# Patient Record
Sex: MALE | Race: WHITE | Employment: OTHER | URBAN - METROPOLITAN AREA
[De-identification: names, ages, dates, MRNs, and addresses within clinical notes are randomized per-mention and may not be internally consistent; named-entity substitution may affect disease eponyms.]

---

## 2017-12-26 PROBLEM — K40.90 UNILATERAL INGUINAL HERNIA: Status: ACTIVE | Noted: 2017-12-26

## 2018-04-03 DIAGNOSIS — I10 ESSENTIAL HYPERTENSION: ICD-10-CM

## 2018-04-03 RX ORDER — LISINOPRIL 20 MG/1
TABLET ORAL
Qty: 30 TABLET | Refills: 1 | Status: SHIPPED | OUTPATIENT
Start: 2018-04-03 | End: 2018-06-04 | Stop reason: SDUPTHER

## 2018-06-04 DIAGNOSIS — I10 ESSENTIAL HYPERTENSION: ICD-10-CM

## 2018-06-04 RX ORDER — LISINOPRIL 20 MG/1
TABLET ORAL
Qty: 30 TABLET | Refills: 2 | Status: SHIPPED | OUTPATIENT
Start: 2018-06-04 | End: 2018-08-28 | Stop reason: SDUPTHER

## 2018-06-18 DIAGNOSIS — M10.9 GOUT SYNOVITIS: ICD-10-CM

## 2018-06-19 ENCOUNTER — HOSPITAL ENCOUNTER (OUTPATIENT)
Age: 68
Discharge: HOME | End: 2018-06-19
Payer: MEDICARE

## 2018-06-19 LAB
ALBUMIN SERPL-MCNC: 4.4 G/DL (ref 3.5–5.2)
ALP BLD-CCNC: 92 U/L (ref 40–129)
ALT SERPL-CCNC: 13 U/L (ref 0–40)
ANION GAP SERPL CALCULATED.3IONS-SCNC: 10 MMOL/L (ref 7–16)
AST SERPL-CCNC: 17 U/L (ref 0–39)
BASOPHILS ABSOLUTE: 0.03 E9/L (ref 0–0.2)
BASOPHILS RELATIVE PERCENT: 0.5 % (ref 0–2)
BILIRUB SERPL-MCNC: 0.4 MG/DL (ref 0–1.2)
BUN BLDV-MCNC: 17 MG/DL (ref 8–23)
CALCIUM SERPL-MCNC: 9.4 MG/DL (ref 8.6–10.2)
CHLORIDE BLD-SCNC: 106 MMOL/L (ref 98–107)
CHOLESTEROL, TOTAL: 226 MG/DL (ref 0–199)
CO2: 26 MMOL/L (ref 22–29)
CREAT SERPL-MCNC: 0.9 MG/DL (ref 0.7–1.2)
EOSINOPHILS ABSOLUTE: 0.17 E9/L (ref 0.05–0.5)
EOSINOPHILS RELATIVE PERCENT: 3 % (ref 0–6)
GFR AFRICAN AMERICAN: >60
GFR NON-AFRICAN AMERICAN: >60 ML/MIN/1.73
GLUCOSE BLD-MCNC: 105 MG/DL (ref 74–109)
HCT VFR BLD CALC: 42.1 % (ref 37–54)
HDLC SERPL-MCNC: 79 MG/DL
HEMOGLOBIN: 14.2 G/DL (ref 12.5–16.5)
IMMATURE GRANULOCYTES #: 0.02 E9/L
IMMATURE GRANULOCYTES %: 0.4 % (ref 0–5)
LDL CHOLESTEROL CALCULATED: 140 MG/DL (ref 0–99)
LYMPHOCYTES ABSOLUTE: 1.39 E9/L (ref 1.5–4)
LYMPHOCYTES RELATIVE PERCENT: 24.9 % (ref 20–42)
MCH RBC QN AUTO: 31.6 PG (ref 26–35)
MCHC RBC AUTO-ENTMCNC: 33.7 % (ref 32–34.5)
MCV RBC AUTO: 93.6 FL (ref 80–99.9)
MONOCYTES ABSOLUTE: 0.38 E9/L (ref 0.1–0.95)
MONOCYTES RELATIVE PERCENT: 6.8 % (ref 2–12)
NEUTROPHILS ABSOLUTE: 3.59 E9/L (ref 1.8–7.3)
NEUTROPHILS RELATIVE PERCENT: 64.4 % (ref 43–80)
PDW BLD-RTO: 14.1 FL (ref 11.5–15)
PLATELET # BLD: 201 E9/L (ref 130–450)
PMV BLD AUTO: 9.2 FL (ref 7–12)
POTASSIUM SERPL-SCNC: 4.7 MMOL/L (ref 3.5–5)
PROSTATE SPECIFIC ANTIGEN: 0.69 NG/ML (ref 0–4)
RBC # BLD: 4.5 E12/L (ref 3.8–5.8)
SODIUM BLD-SCNC: 142 MMOL/L (ref 132–146)
TOTAL PROTEIN: 7.8 G/DL (ref 6.4–8.3)
TRIGL SERPL-MCNC: 34 MG/DL (ref 0–149)
VLDLC SERPL CALC-MCNC: 7 MG/DL
WBC # BLD: 5.6 E9/L (ref 4.5–11.5)

## 2018-06-19 PROCEDURE — 85025 COMPLETE CBC W/AUTO DIFF WBC: CPT

## 2018-06-19 PROCEDURE — 80061 LIPID PANEL: CPT

## 2018-06-19 PROCEDURE — G0103 PSA SCREENING: HCPCS

## 2018-06-19 PROCEDURE — 80053 COMPREHEN METABOLIC PANEL: CPT

## 2018-06-19 PROCEDURE — 36415 COLL VENOUS BLD VENIPUNCTURE: CPT

## 2018-06-20 RX ORDER — ALLOPURINOL 300 MG/1
300 TABLET ORAL DAILY
Qty: 30 TABLET | Refills: 5 | Status: SHIPPED | OUTPATIENT
Start: 2018-06-20 | End: 2018-12-17 | Stop reason: SDUPTHER

## 2018-07-03 ENCOUNTER — OFFICE VISIT (OUTPATIENT)
Dept: INTERNAL MEDICINE CLINIC | Age: 68
End: 2018-07-03
Payer: MEDICARE

## 2018-07-03 VITALS
OXYGEN SATURATION: 97 % | TEMPERATURE: 98.4 F | RESPIRATION RATE: 16 BRPM | BODY MASS INDEX: 24.14 KG/M2 | DIASTOLIC BLOOD PRESSURE: 90 MMHG | HEART RATE: 71 BPM | WEIGHT: 163 LBS | SYSTOLIC BLOOD PRESSURE: 155 MMHG | HEIGHT: 69 IN

## 2018-07-03 DIAGNOSIS — M79.18 BUTTOCK PAIN: ICD-10-CM

## 2018-07-03 DIAGNOSIS — F41.9 ANXIETY: ICD-10-CM

## 2018-07-03 DIAGNOSIS — I15.9 SECONDARY HYPERTENSION: ICD-10-CM

## 2018-07-03 DIAGNOSIS — E78.2 MIXED HYPERLIPIDEMIA: Primary | ICD-10-CM

## 2018-07-03 DIAGNOSIS — Z13.6 SCREENING FOR ABDOMINAL AORTIC ANEURYSM: ICD-10-CM

## 2018-07-03 DIAGNOSIS — Z00.00 HEALTHCARE MAINTENANCE: ICD-10-CM

## 2018-07-03 DIAGNOSIS — M1A.0210 IDIOPATHIC CHRONIC GOUT OF RIGHT ELBOW WITHOUT TOPHUS: ICD-10-CM

## 2018-07-03 PROCEDURE — 99213 OFFICE O/P EST LOW 20 MIN: CPT | Performed by: INTERNAL MEDICINE

## 2018-07-03 PROCEDURE — G8427 DOCREV CUR MEDS BY ELIG CLIN: HCPCS | Performed by: INTERNAL MEDICINE

## 2018-07-03 PROCEDURE — 4040F PNEUMOC VAC/ADMIN/RCVD: CPT | Performed by: INTERNAL MEDICINE

## 2018-07-03 PROCEDURE — 1036F TOBACCO NON-USER: CPT | Performed by: INTERNAL MEDICINE

## 2018-07-03 PROCEDURE — 1123F ACP DISCUSS/DSCN MKR DOCD: CPT | Performed by: INTERNAL MEDICINE

## 2018-07-03 PROCEDURE — G8598 ASA/ANTIPLAT THER USED: HCPCS | Performed by: INTERNAL MEDICINE

## 2018-07-03 PROCEDURE — 90732 PPSV23 VACC 2 YRS+ SUBQ/IM: CPT | Performed by: INTERNAL MEDICINE

## 2018-07-03 PROCEDURE — 3017F COLORECTAL CA SCREEN DOC REV: CPT | Performed by: INTERNAL MEDICINE

## 2018-07-03 PROCEDURE — G8420 CALC BMI NORM PARAMETERS: HCPCS | Performed by: INTERNAL MEDICINE

## 2018-07-03 RX ORDER — LORAZEPAM 0.5 MG/1
0.5 TABLET ORAL EVERY 8 HOURS PRN
Qty: 90 TABLET | Refills: 0 | Status: SHIPPED | OUTPATIENT
Start: 2018-07-03 | End: 2019-01-15 | Stop reason: SDUPTHER

## 2018-07-03 RX ORDER — HYDROCODONE BITARTRATE AND ACETAMINOPHEN 7.5; 325 MG/1; MG/1
1 TABLET ORAL EVERY 12 HOURS PRN
Qty: 30 TABLET | Refills: 0 | Status: SHIPPED | OUTPATIENT
Start: 2018-07-03 | End: 2018-12-03

## 2018-07-03 NOTE — PROGRESS NOTES
I was present when the patient was in the clinic. I have discussed the case, including pertinent history and exam findings with the resident. I agree with the assessment, plan and orders as documented by the resident.     [address]  7/3/2018

## 2018-07-03 NOTE — PROGRESS NOTES
Internal Medicine Clinic History & Physical     NAME: Marielos Castano        :  1950        MRN:  [id number]    No chief complaint on file. History of Present Illness   7/3/2018  Patient presents for routine follow up. No acute complaints currently. Review of Systems  Please see HPI above. All bolded are positive.   Gen: fever, chills, fatigue, weakness, diaphoresis, or unintentional weight change  Head: headache, vision change, hearing loss  Chest: chest pain/heaviness, palpitations  Lungs: shortness of breath, wheezing, coughing, hemoptysis  Abdomen: abdominal pain, nausea, vomiting, diarrhea, constipation, melena, hematochezia, hematemesis, or loss of appetite  Extremities: lower extremity edema, myalgias, arthralgias  Urinary: dysuria, hematuria, weak flow, or increase in frequency  Neurologic: lightheadedness, dizziness, confusion, syncope  Psychiatric: depression, suicidal ideation, or anxiety      Past Medical History:   Diagnosis Date    Blurred vision, right eye     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,     Gout     History of cardiovascular stress test 2015    Lexiscan stress test    Hypertension     Kidney stone     stent/    Osteoarthritis        Past Surgical History:   Procedure Laterality Date    LITHOTRIPSY      MANDIBLE SURGERY      OTHER SURGICAL HISTORY Left 2014    EXCISION OF OLECRANON BURSA    OTHER SURGICAL HISTORY  2018    Laparoscopic transabdominal right inguinal hernia repair with mesk and Excision of Lipoma of the Cord    TONSILLECTOMY         Family History  Family History   Problem Relation Age of Onset    Heart Disease Mother     Stroke Mother     Coronary Art Dis Father        Social History      Social History   Substance Use Topics    Smoking status: Former Smoker     Packs/day: 0.25     Years: 50.00     Types: Cigarettes     Quit date: 2015    Smokeless tobacco: Never Used    Alcohol use 0.6 oz/week     1 Cans of beer per week Comment: social     Illicit drug use-     Home Medications  Current Outpatient Prescriptions on File Prior to Visit   Medication Sig Dispense Refill    allopurinol (ZYLOPRIM) 300 MG tablet Take 1 tablet by mouth daily 30 tablet 5    lisinopril (PRINIVIL;ZESTRIL) 20 MG tablet TAKE ONE TABLET BY MOUTH EVERY DAY 30 tablet 2    ibuprofen (ADVIL;MOTRIN) 800 MG tablet Take 1 tablet by mouth every 6 hours as needed for Pain 20 tablet 0    LORazepam (ATIVAN) 0.5 MG tablet Take 1 tablet by mouth every 8 hours as needed for Anxiety . 90 tablet 0    HYDROcodone-acetaminophen (NORCO) 7.5-325 MG per tablet Take 1 tablet by mouth every 8 hours as needed for Pain . Earliest Fill Date: 12/26/17 60 tablet 0    clopidogrel (PLAVIX) 75 MG tablet Take 75 mg by mouth every other day        No current facility-administered medications on file prior to visit. Allergies  No Known Allergies    Objective  There were no vitals filed for this visit. Physical Exam:  General: Awake, alert, and oriented to person, place, time, and purpose, appears stated age and cooperative, No acute distress  Head: Normocephalic, atraumatic  Eyes: conjunctivae/corneas clear. EOM's intact. Mouth: Mucous membranes moist with no pharyngeal exudate or erythema  Neck: no JVD, no adenopathy, symmetrical, trachea midline  Back: symmetric, ROM normal  Lungs: clear to auscultation bilaterally without wheezes, rales, or rhonchi  Heart: regular rate and rhythm, S1, S2 normal, no murmur, click, rub or gallop  Abdomen: soft, non-tender; bowel sounds normal; no masses,  no organomegaly  Extremities:atraumatic, no cyanosis, no edema  Pulses: 2+ pedal pulses palpated  Skin: color, texture, turgor within normal limits. No rashes or lesions or normal  Neurologic:speech appropriate, moves all 4 extremities, normal muscle strength and tone, CN 2-12 grossly intact  Osteopathic/Structural Exam: Examined in seated and supine position.  Normal thoracic kyphosis, normal

## 2018-08-28 DIAGNOSIS — I10 ESSENTIAL HYPERTENSION: ICD-10-CM

## 2018-08-28 RX ORDER — LISINOPRIL 20 MG/1
TABLET ORAL
Qty: 30 TABLET | Refills: 1 | Status: SHIPPED | OUTPATIENT
Start: 2018-08-28 | End: 2018-10-30 | Stop reason: SDUPTHER

## 2018-08-30 DIAGNOSIS — I10 ESSENTIAL HYPERTENSION: ICD-10-CM

## 2018-08-30 RX ORDER — LISINOPRIL 20 MG/1
TABLET ORAL
Qty: 30 TABLET | Refills: 1 | Status: CANCELLED | OUTPATIENT
Start: 2018-08-30

## 2018-10-30 DIAGNOSIS — I10 ESSENTIAL HYPERTENSION: ICD-10-CM

## 2018-10-31 RX ORDER — LISINOPRIL 20 MG/1
TABLET ORAL
Qty: 30 TABLET | Refills: 5 | Status: SHIPPED | OUTPATIENT
Start: 2018-10-31 | End: 2019-04-27 | Stop reason: SDUPTHER

## 2018-12-10 ENCOUNTER — HOSPITAL ENCOUNTER (OUTPATIENT)
Age: 68
Discharge: HOME | End: 2018-12-10
Payer: MEDICARE

## 2018-12-10 DIAGNOSIS — Z00.00 HEALTHCARE MAINTENANCE: ICD-10-CM

## 2018-12-10 LAB
CREATININE URINE: 93 MG/DL (ref 40–278)
HBA1C MFR BLD: 5.5 % (ref 4–5.6)
MICROALBUMIN UR-MCNC: <12 MG/L
MICROALBUMIN/CREAT UR-RTO: ABNORMAL (ref 0–30)

## 2018-12-10 PROCEDURE — 83036 HEMOGLOBIN GLYCOSYLATED A1C: CPT

## 2018-12-10 PROCEDURE — 82044 UR ALBUMIN SEMIQUANTITATIVE: CPT

## 2018-12-10 PROCEDURE — 36415 COLL VENOUS BLD VENIPUNCTURE: CPT

## 2018-12-10 PROCEDURE — 82570 ASSAY OF URINE CREATININE: CPT

## 2018-12-17 DIAGNOSIS — M10.9 GOUT SYNOVITIS: ICD-10-CM

## 2018-12-18 RX ORDER — ALLOPURINOL 300 MG/1
300 TABLET ORAL DAILY
Qty: 30 TABLET | Refills: 5 | Status: SHIPPED | OUTPATIENT
Start: 2018-12-18 | End: 2019-06-10 | Stop reason: SDUPTHER

## 2019-01-15 ENCOUNTER — OFFICE VISIT (OUTPATIENT)
Dept: INTERNAL MEDICINE CLINIC | Age: 69
End: 2019-01-15
Payer: MEDICARE

## 2019-01-15 VITALS
HEART RATE: 86 BPM | WEIGHT: 164.2 LBS | BODY MASS INDEX: 24.32 KG/M2 | HEIGHT: 69 IN | DIASTOLIC BLOOD PRESSURE: 100 MMHG | OXYGEN SATURATION: 97 % | SYSTOLIC BLOOD PRESSURE: 138 MMHG | TEMPERATURE: 98.7 F

## 2019-01-15 DIAGNOSIS — G89.29 CHRONIC BACK PAIN, UNSPECIFIED BACK LOCATION, UNSPECIFIED BACK PAIN LATERALITY: ICD-10-CM

## 2019-01-15 DIAGNOSIS — I65.22 STENOSIS OF LEFT CAROTID ARTERY: ICD-10-CM

## 2019-01-15 DIAGNOSIS — I15.9 SECONDARY HYPERTENSION: ICD-10-CM

## 2019-01-15 DIAGNOSIS — E78.2 MIXED HYPERLIPIDEMIA: Primary | ICD-10-CM

## 2019-01-15 DIAGNOSIS — M54.9 CHRONIC BACK PAIN, UNSPECIFIED BACK LOCATION, UNSPECIFIED BACK PAIN LATERALITY: ICD-10-CM

## 2019-01-15 DIAGNOSIS — F41.9 ANXIETY: ICD-10-CM

## 2019-01-15 DIAGNOSIS — Z23 INFLUENZA VACCINE NEEDED: ICD-10-CM

## 2019-01-15 PROCEDURE — 4040F PNEUMOC VAC/ADMIN/RCVD: CPT | Performed by: INTERNAL MEDICINE

## 2019-01-15 PROCEDURE — G8598 ASA/ANTIPLAT THER USED: HCPCS | Performed by: INTERNAL MEDICINE

## 2019-01-15 PROCEDURE — 99213 OFFICE O/P EST LOW 20 MIN: CPT | Performed by: INTERNAL MEDICINE

## 2019-01-15 PROCEDURE — 1101F PT FALLS ASSESS-DOCD LE1/YR: CPT | Performed by: INTERNAL MEDICINE

## 2019-01-15 PROCEDURE — 1123F ACP DISCUSS/DSCN MKR DOCD: CPT | Performed by: INTERNAL MEDICINE

## 2019-01-15 PROCEDURE — G8427 DOCREV CUR MEDS BY ELIG CLIN: HCPCS | Performed by: INTERNAL MEDICINE

## 2019-01-15 PROCEDURE — 3017F COLORECTAL CA SCREEN DOC REV: CPT | Performed by: INTERNAL MEDICINE

## 2019-01-15 PROCEDURE — G8420 CALC BMI NORM PARAMETERS: HCPCS | Performed by: INTERNAL MEDICINE

## 2019-01-15 PROCEDURE — G8482 FLU IMMUNIZE ORDER/ADMIN: HCPCS | Performed by: INTERNAL MEDICINE

## 2019-01-15 PROCEDURE — 1036F TOBACCO NON-USER: CPT | Performed by: INTERNAL MEDICINE

## 2019-01-15 RX ORDER — LORAZEPAM 0.5 MG/1
0.5 TABLET ORAL DAILY PRN
Qty: 90 TABLET | Refills: 0 | Status: SHIPPED | OUTPATIENT
Start: 2019-01-15 | End: 2019-07-09 | Stop reason: SDUPTHER

## 2019-01-15 RX ORDER — HYDROCODONE BITARTRATE AND ACETAMINOPHEN 7.5; 325 MG/1; MG/1
1 TABLET ORAL DAILY PRN
Qty: 60 TABLET | Refills: 0 | Status: SHIPPED | OUTPATIENT
Start: 2019-01-15 | End: 2019-07-09 | Stop reason: ALTCHOICE

## 2019-01-15 RX ORDER — ATORVASTATIN CALCIUM 40 MG/1
40 TABLET, FILM COATED ORAL DAILY
Qty: 30 TABLET | Refills: 3 | Status: SHIPPED | OUTPATIENT
Start: 2019-01-15 | End: 2019-05-21 | Stop reason: SDUPTHER

## 2019-01-15 ASSESSMENT — PATIENT HEALTH QUESTIONNAIRE - PHQ9
SUM OF ALL RESPONSES TO PHQ QUESTIONS 1-9: 0
1. LITTLE INTEREST OR PLEASURE IN DOING THINGS: 0
SUM OF ALL RESPONSES TO PHQ QUESTIONS 1-9: 0
SUM OF ALL RESPONSES TO PHQ9 QUESTIONS 1 & 2: 0
2. FEELING DOWN, DEPRESSED OR HOPELESS: 0

## 2019-01-16 ENCOUNTER — TELEPHONE (OUTPATIENT)
Dept: INTERNAL MEDICINE CLINIC | Age: 69
End: 2019-01-16

## 2019-01-16 PROCEDURE — 90688 IIV4 VACCINE SPLT 0.5 ML IM: CPT | Performed by: INTERNAL MEDICINE

## 2019-01-23 ENCOUNTER — TELEPHONE (OUTPATIENT)
Dept: INTERNAL MEDICINE CLINIC | Age: 69
End: 2019-01-23

## 2019-04-27 DIAGNOSIS — I10 ESSENTIAL HYPERTENSION: ICD-10-CM

## 2019-04-30 RX ORDER — LISINOPRIL 20 MG/1
TABLET ORAL
Qty: 30 TABLET | Refills: 4 | Status: SHIPPED | OUTPATIENT
Start: 2019-04-30 | End: 2019-05-01 | Stop reason: SDUPTHER

## 2019-05-01 DIAGNOSIS — I10 ESSENTIAL HYPERTENSION: ICD-10-CM

## 2019-05-01 RX ORDER — LISINOPRIL 20 MG/1
20 TABLET ORAL DAILY
Qty: 30 TABLET | Refills: 4 | Status: SHIPPED | OUTPATIENT
Start: 2019-05-01 | End: 2020-01-14 | Stop reason: SDUPTHER

## 2019-05-21 DIAGNOSIS — E78.2 MIXED HYPERLIPIDEMIA: ICD-10-CM

## 2019-05-21 RX ORDER — ATORVASTATIN CALCIUM 40 MG/1
40 TABLET, FILM COATED ORAL DAILY
Qty: 30 TABLET | Refills: 5 | Status: SHIPPED | OUTPATIENT
Start: 2019-05-21 | End: 2019-11-25 | Stop reason: SDUPTHER

## 2019-06-10 DIAGNOSIS — M10.9 GOUT SYNOVITIS: ICD-10-CM

## 2019-06-11 RX ORDER — ALLOPURINOL 300 MG/1
TABLET ORAL
Qty: 30 TABLET | Refills: 4 | Status: SHIPPED | OUTPATIENT
Start: 2019-06-11 | End: 2019-11-08 | Stop reason: SDUPTHER

## 2019-06-28 ENCOUNTER — HOSPITAL ENCOUNTER (OUTPATIENT)
Age: 69
Discharge: HOME | End: 2019-06-28
Payer: MEDICARE

## 2019-06-28 DIAGNOSIS — E78.2 MIXED HYPERLIPIDEMIA: ICD-10-CM

## 2019-06-28 DIAGNOSIS — I15.9 SECONDARY HYPERTENSION: ICD-10-CM

## 2019-06-28 LAB
ALBUMIN SERPL-MCNC: 5 G/DL (ref 3.5–5.2)
ALP BLD-CCNC: 112 U/L (ref 40–129)
ALT SERPL-CCNC: 19 U/L (ref 0–40)
ANION GAP SERPL CALCULATED.3IONS-SCNC: 13 MMOL/L (ref 7–16)
AST SERPL-CCNC: 22 U/L (ref 0–39)
BASOPHILS ABSOLUTE: 0.03 E9/L (ref 0–0.2)
BASOPHILS RELATIVE PERCENT: 0.5 % (ref 0–2)
BILIRUB SERPL-MCNC: 0.7 MG/DL (ref 0–1.2)
BUN BLDV-MCNC: 23 MG/DL (ref 8–23)
CALCIUM SERPL-MCNC: 10.5 MG/DL (ref 8.6–10.2)
CHLORIDE BLD-SCNC: 104 MMOL/L (ref 98–107)
CHOLESTEROL, TOTAL: 157 MG/DL (ref 0–199)
CO2: 25 MMOL/L (ref 22–29)
CREAT SERPL-MCNC: 1.1 MG/DL (ref 0.7–1.2)
EOSINOPHILS ABSOLUTE: 0.16 E9/L (ref 0.05–0.5)
EOSINOPHILS RELATIVE PERCENT: 2.7 % (ref 0–6)
GFR AFRICAN AMERICAN: >60
GFR NON-AFRICAN AMERICAN: >60 ML/MIN/1.73
GLUCOSE BLD-MCNC: 100 MG/DL (ref 74–99)
HCT VFR BLD CALC: 43.6 % (ref 37–54)
HDLC SERPL-MCNC: 67 MG/DL
HEMOGLOBIN: 14.2 G/DL (ref 12.5–16.5)
IMMATURE GRANULOCYTES #: 0.01 E9/L
IMMATURE GRANULOCYTES %: 0.2 % (ref 0–5)
LDL CHOLESTEROL CALCULATED: 81 MG/DL (ref 0–99)
LYMPHOCYTES ABSOLUTE: 1.79 E9/L (ref 1.5–4)
LYMPHOCYTES RELATIVE PERCENT: 29.7 % (ref 20–42)
MCH RBC QN AUTO: 31.4 PG (ref 26–35)
MCHC RBC AUTO-ENTMCNC: 32.6 % (ref 32–34.5)
MCV RBC AUTO: 96.5 FL (ref 80–99.9)
MONOCYTES ABSOLUTE: 0.45 E9/L (ref 0.1–0.95)
MONOCYTES RELATIVE PERCENT: 7.5 % (ref 2–12)
NEUTROPHILS ABSOLUTE: 3.59 E9/L (ref 1.8–7.3)
NEUTROPHILS RELATIVE PERCENT: 59.4 % (ref 43–80)
PDW BLD-RTO: 13.5 FL (ref 11.5–15)
PLATELET # BLD: 227 E9/L (ref 130–450)
PMV BLD AUTO: 9.6 FL (ref 7–12)
POTASSIUM SERPL-SCNC: 4.9 MMOL/L (ref 3.5–5)
RBC # BLD: 4.52 E12/L (ref 3.8–5.8)
SODIUM BLD-SCNC: 142 MMOL/L (ref 132–146)
TOTAL PROTEIN: 8.2 G/DL (ref 6.4–8.3)
TRIGL SERPL-MCNC: 46 MG/DL (ref 0–149)
VLDLC SERPL CALC-MCNC: 9 MG/DL
WBC # BLD: 6 E9/L (ref 4.5–11.5)

## 2019-06-28 PROCEDURE — 36415 COLL VENOUS BLD VENIPUNCTURE: CPT

## 2019-06-28 PROCEDURE — 80061 LIPID PANEL: CPT

## 2019-06-28 PROCEDURE — 80053 COMPREHEN METABOLIC PANEL: CPT

## 2019-06-28 PROCEDURE — 85025 COMPLETE CBC W/AUTO DIFF WBC: CPT

## 2019-07-09 ENCOUNTER — OFFICE VISIT (OUTPATIENT)
Dept: INTERNAL MEDICINE CLINIC | Age: 69
End: 2019-07-09
Payer: MEDICARE

## 2019-07-09 VITALS
WEIGHT: 160.6 LBS | TEMPERATURE: 98.3 F | OXYGEN SATURATION: 97 % | HEART RATE: 82 BPM | SYSTOLIC BLOOD PRESSURE: 132 MMHG | DIASTOLIC BLOOD PRESSURE: 80 MMHG | HEIGHT: 69 IN | BODY MASS INDEX: 23.79 KG/M2

## 2019-07-09 DIAGNOSIS — F41.9 ANXIETY: ICD-10-CM

## 2019-07-09 DIAGNOSIS — E78.5 HYPERLIPIDEMIA, UNSPECIFIED HYPERLIPIDEMIA TYPE: ICD-10-CM

## 2019-07-09 DIAGNOSIS — G89.29 CHRONIC LOW BACK PAIN, UNSPECIFIED BACK PAIN LATERALITY, WITH SCIATICA PRESENCE UNSPECIFIED: Primary | ICD-10-CM

## 2019-07-09 DIAGNOSIS — I15.9 SECONDARY HYPERTENSION: ICD-10-CM

## 2019-07-09 DIAGNOSIS — E55.9 VITAMIN D DEFICIENCY: ICD-10-CM

## 2019-07-09 DIAGNOSIS — M54.5 CHRONIC LOW BACK PAIN, UNSPECIFIED BACK PAIN LATERALITY, WITH SCIATICA PRESENCE UNSPECIFIED: Primary | ICD-10-CM

## 2019-07-09 PROCEDURE — 1036F TOBACCO NON-USER: CPT | Performed by: INTERNAL MEDICINE

## 2019-07-09 PROCEDURE — 99213 OFFICE O/P EST LOW 20 MIN: CPT | Performed by: INTERNAL MEDICINE

## 2019-07-09 PROCEDURE — 3017F COLORECTAL CA SCREEN DOC REV: CPT | Performed by: INTERNAL MEDICINE

## 2019-07-09 PROCEDURE — G8427 DOCREV CUR MEDS BY ELIG CLIN: HCPCS | Performed by: INTERNAL MEDICINE

## 2019-07-09 PROCEDURE — 4040F PNEUMOC VAC/ADMIN/RCVD: CPT | Performed by: INTERNAL MEDICINE

## 2019-07-09 PROCEDURE — G8598 ASA/ANTIPLAT THER USED: HCPCS | Performed by: INTERNAL MEDICINE

## 2019-07-09 PROCEDURE — G8420 CALC BMI NORM PARAMETERS: HCPCS | Performed by: INTERNAL MEDICINE

## 2019-07-09 PROCEDURE — 1123F ACP DISCUSS/DSCN MKR DOCD: CPT | Performed by: INTERNAL MEDICINE

## 2019-07-09 RX ORDER — HYDROCODONE BITARTRATE AND ACETAMINOPHEN 7.5; 325 MG/1; MG/1
1 TABLET ORAL DAILY PRN
Qty: 30 TABLET | Refills: 0 | Status: SHIPPED | OUTPATIENT
Start: 2019-07-09 | End: 2020-01-09

## 2019-07-09 RX ORDER — LORAZEPAM 0.5 MG/1
0.5 TABLET ORAL DAILY PRN
Qty: 45 TABLET | Refills: 0 | Status: SHIPPED | OUTPATIENT
Start: 2019-07-09 | End: 2020-01-09

## 2019-07-09 NOTE — PROGRESS NOTES
Internal Medicine Clinic Progress Note    NAME: Xenia Rosario        :  1950        MRN:  [id number]    Chief Complaint   Patient presents with    [address] Maintenance     Due for Tdap, shingles, PCV13 and AWV       History of Present Illness  2019  Patient presents for routine follow up. No acute complaints noted. 1/15/2019  Patient presents for routine follow up. Denies any acute complaints. No complaints of chest pain or shortness of breath. Denies fever or chills.      7/3/2018  Patient presents for routine follow up. No acute complaints currently. Review of Systems  Please see HPI above. All bolded are positive.   Gen: fever, chills, fatigue, weakness, diaphoresis, or unintentional weight change  Head: headache, vision change, hearing loss  Chest: chest pain/heaviness, palpitations  Lungs: shortness of breath, wheezing, coughing, hemoptysis  Abdomen: abdominal pain, nausea, vomiting, diarrhea, constipation, melena, hematochezia, hematemesis, or loss of appetite  Extremities: lower extremity edema, myalgias, arthralgias  Urinary: dysuria, hematuria, weak flow, or increase in frequency  Neurologic: lightheadedness, dizziness, confusion, syncope  Psychiatric: depression, suicidal ideation, or anxiety      Past Medical History:   Diagnosis Date    Blurred vision, right eye     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,     Gout     History of cardiovascular stress test 2015    Lexiscan stress test    Hypertension     Kidney stone     stent/    Osteoarthritis        Past Surgical History:   Procedure Laterality Date    LITHOTRIPSY      MANDIBLE SURGERY      OTHER SURGICAL HISTORY Left 2014    EXCISION OF OLECRANON BURSA    OTHER SURGICAL HISTORY  2018    Laparoscopic transabdominal right inguinal hernia repair with mesk and Excision of Lipoma of the Cord    TONSILLECTOMY         Family History  Family History   Problem Relation Age of Onset    Heart symmetric, ROM normal  Lungs: clear to auscultation bilaterally without wheezes, rales, or rhonchi  Heart: regular rate and rhythm, S1, S2 normal, no murmur, click, rub or gallop  Abdomen: soft, non-tender; bowel sounds normal; no masses,  no organomegaly  Extremities:atraumatic, no cyanosis, no edema  Pulses: 2+ pedal pulses palpated  Skin: color, texture, turgor within normal limits. No rashes or lesions or normal  Neurologic:speech appropriate, moves all 4 extremities, normal muscle strength and tone, CN 2-12 grossly intact  Osteopathic/Structural Exam: Examined in seated and supine position. Normal thoracic kyphosis, normal lumbar lordosis. No acute changes. Labs  Lab Results   Component Value Date    WBC 6.0 06/28/2019    HGB 14.2 06/28/2019    HCT 43.6 06/28/2019     06/28/2019     06/28/2019    K 4.9 06/28/2019     06/28/2019    CREATININE 1.1 06/28/2019    BUN 23 06/28/2019    CO2 25 06/28/2019    GLUCOSE 100 (H) 06/28/2019    ALT 19 06/28/2019    AST 22 06/28/2019    INR 1.0 08/22/2015     Lab Results   Component Value Date    INR 1.0 08/22/2015    INR 1.0 08/19/2015    PROTIME 12.3 08/22/2015    PROTIME 12.3 08/19/2015      Lab Results   Component Value Date    TSH 2.190 12/06/2017     Lab Results   Component Value Date    TRIG 46 06/28/2019    TRIG 34 06/19/2018    TRIG 36 12/06/2017     Lab Results   Component Value Date    HDL 67 06/28/2019    HDL 79 06/19/2018    HDL 82 12/06/2017     Lab Results   Component Value Date    LDLCALC 81 06/28/2019    LDLCALC 140 (H) 06/19/2018    LDLCALC 123 (H) 12/06/2017     Lab Results   Component Value Date    LABA1C 5.5 12/10/2018     All recent labs were reviewed. Please see electronic chart for a more comprehensive set of labs    Radiology  No results found.     Assessment  Patient Active Problem List   Diagnosis    HTN (hypertension)    OA (osteoarthritis)    Hyperlipidemia    Bursal abscess    Gout synovitis    Chronic gout of right

## 2019-07-10 ENCOUNTER — TELEPHONE (OUTPATIENT)
Dept: INTERNAL MEDICINE CLINIC | Age: 69
End: 2019-07-10

## 2019-11-08 DIAGNOSIS — M10.9 GOUT SYNOVITIS: ICD-10-CM

## 2019-11-08 RX ORDER — ALLOPURINOL 300 MG/1
TABLET ORAL
Qty: 30 TABLET | Refills: 3 | Status: SHIPPED | OUTPATIENT
Start: 2019-11-08 | End: 2020-01-14 | Stop reason: SDUPTHER

## 2019-11-25 DIAGNOSIS — E78.2 MIXED HYPERLIPIDEMIA: ICD-10-CM

## 2019-11-25 RX ORDER — ATORVASTATIN CALCIUM 40 MG/1
40 TABLET, FILM COATED ORAL DAILY
Qty: 30 TABLET | Refills: 5 | Status: SHIPPED
Start: 2019-11-25 | End: 2020-05-12 | Stop reason: SDUPTHER

## 2019-12-10 ENCOUNTER — HOSPITAL ENCOUNTER (OUTPATIENT)
Age: 69
Discharge: HOME | End: 2019-12-10
Payer: MEDICARE

## 2019-12-10 DIAGNOSIS — E78.5 HYPERLIPIDEMIA, UNSPECIFIED HYPERLIPIDEMIA TYPE: ICD-10-CM

## 2019-12-10 DIAGNOSIS — I15.9 SECONDARY HYPERTENSION: ICD-10-CM

## 2019-12-10 DIAGNOSIS — E55.9 VITAMIN D DEFICIENCY: ICD-10-CM

## 2019-12-10 LAB
ALBUMIN SERPL-MCNC: 4.9 G/DL (ref 3.5–5.2)
ALP BLD-CCNC: 109 U/L (ref 40–129)
ALT SERPL-CCNC: 25 U/L (ref 0–40)
ANION GAP SERPL CALCULATED.3IONS-SCNC: 13 MMOL/L (ref 7–16)
AST SERPL-CCNC: 29 U/L (ref 0–39)
BASOPHILS ABSOLUTE: 0.03 E9/L (ref 0–0.2)
BASOPHILS RELATIVE PERCENT: 0.4 % (ref 0–2)
BILIRUB SERPL-MCNC: 0.5 MG/DL (ref 0–1.2)
BUN BLDV-MCNC: 19 MG/DL (ref 8–23)
CALCIUM SERPL-MCNC: 10.4 MG/DL (ref 8.6–10.2)
CHLORIDE BLD-SCNC: 102 MMOL/L (ref 98–107)
CHOLESTEROL, TOTAL: 150 MG/DL (ref 0–199)
CO2: 25 MMOL/L (ref 22–29)
CREAT SERPL-MCNC: 1 MG/DL (ref 0.7–1.2)
EOSINOPHILS ABSOLUTE: 0.14 E9/L (ref 0.05–0.5)
EOSINOPHILS RELATIVE PERCENT: 1.9 % (ref 0–6)
GFR AFRICAN AMERICAN: >60
GFR NON-AFRICAN AMERICAN: >60 ML/MIN/1.73
GLUCOSE BLD-MCNC: 98 MG/DL (ref 74–99)
HCT VFR BLD CALC: 42.2 % (ref 37–54)
HDLC SERPL-MCNC: 72 MG/DL
HEMOGLOBIN: 13.7 G/DL (ref 12.5–16.5)
IMMATURE GRANULOCYTES #: 0.03 E9/L
IMMATURE GRANULOCYTES %: 0.4 % (ref 0–5)
LDL CHOLESTEROL CALCULATED: 69 MG/DL (ref 0–99)
LYMPHOCYTES ABSOLUTE: 1.66 E9/L (ref 1.5–4)
LYMPHOCYTES RELATIVE PERCENT: 22.5 % (ref 20–42)
MCH RBC QN AUTO: 31.4 PG (ref 26–35)
MCHC RBC AUTO-ENTMCNC: 32.5 % (ref 32–34.5)
MCV RBC AUTO: 96.6 FL (ref 80–99.9)
MONOCYTES ABSOLUTE: 0.44 E9/L (ref 0.1–0.95)
MONOCYTES RELATIVE PERCENT: 6 % (ref 2–12)
NEUTROPHILS ABSOLUTE: 5.07 E9/L (ref 1.8–7.3)
NEUTROPHILS RELATIVE PERCENT: 68.8 % (ref 43–80)
PDW BLD-RTO: 13.4 FL (ref 11.5–15)
PLATELET # BLD: 209 E9/L (ref 130–450)
PMV BLD AUTO: 9.6 FL (ref 7–12)
POTASSIUM SERPL-SCNC: 4.7 MMOL/L (ref 3.5–5)
RBC # BLD: 4.37 E12/L (ref 3.8–5.8)
SODIUM BLD-SCNC: 140 MMOL/L (ref 132–146)
TOTAL PROTEIN: 8.1 G/DL (ref 6.4–8.3)
TRIGL SERPL-MCNC: 45 MG/DL (ref 0–149)
VITAMIN D 25-HYDROXY: 39 NG/ML (ref 30–100)
VLDLC SERPL CALC-MCNC: 9 MG/DL
WBC # BLD: 7.4 E9/L (ref 4.5–11.5)

## 2019-12-10 PROCEDURE — 82306 VITAMIN D 25 HYDROXY: CPT

## 2019-12-10 PROCEDURE — 36415 COLL VENOUS BLD VENIPUNCTURE: CPT

## 2019-12-10 PROCEDURE — 80053 COMPREHEN METABOLIC PANEL: CPT

## 2019-12-10 PROCEDURE — 85025 COMPLETE CBC W/AUTO DIFF WBC: CPT

## 2019-12-10 PROCEDURE — 80061 LIPID PANEL: CPT

## 2020-01-14 ENCOUNTER — TELEPHONE (OUTPATIENT)
Dept: INTERNAL MEDICINE CLINIC | Age: 70
End: 2020-01-14

## 2020-01-14 ENCOUNTER — OFFICE VISIT (OUTPATIENT)
Dept: INTERNAL MEDICINE CLINIC | Age: 70
End: 2020-01-14
Payer: MEDICARE

## 2020-01-14 VITALS
HEART RATE: 88 BPM | TEMPERATURE: 98.2 F | HEIGHT: 69 IN | BODY MASS INDEX: 23.82 KG/M2 | WEIGHT: 160.8 LBS | DIASTOLIC BLOOD PRESSURE: 96 MMHG | SYSTOLIC BLOOD PRESSURE: 148 MMHG | OXYGEN SATURATION: 98 %

## 2020-01-14 PROCEDURE — 1123F ACP DISCUSS/DSCN MKR DOCD: CPT | Performed by: INTERNAL MEDICINE

## 2020-01-14 PROCEDURE — 4040F PNEUMOC VAC/ADMIN/RCVD: CPT | Performed by: INTERNAL MEDICINE

## 2020-01-14 PROCEDURE — 99213 OFFICE O/P EST LOW 20 MIN: CPT | Performed by: INTERNAL MEDICINE

## 2020-01-14 PROCEDURE — G8482 FLU IMMUNIZE ORDER/ADMIN: HCPCS | Performed by: INTERNAL MEDICINE

## 2020-01-14 PROCEDURE — G8427 DOCREV CUR MEDS BY ELIG CLIN: HCPCS | Performed by: INTERNAL MEDICINE

## 2020-01-14 PROCEDURE — 90688 IIV4 VACCINE SPLT 0.5 ML IM: CPT | Performed by: INTERNAL MEDICINE

## 2020-01-14 PROCEDURE — 1036F TOBACCO NON-USER: CPT | Performed by: INTERNAL MEDICINE

## 2020-01-14 PROCEDURE — 3017F COLORECTAL CA SCREEN DOC REV: CPT | Performed by: INTERNAL MEDICINE

## 2020-01-14 PROCEDURE — G8420 CALC BMI NORM PARAMETERS: HCPCS | Performed by: INTERNAL MEDICINE

## 2020-01-14 RX ORDER — HYDROCODONE BITARTRATE AND ACETAMINOPHEN 7.5; 325 MG/1; MG/1
1 TABLET ORAL DAILY PRN
Qty: 15 TABLET | Refills: 0 | Status: SHIPPED | OUTPATIENT
Start: 2020-01-14 | End: 2020-07-14

## 2020-01-14 RX ORDER — ALLOPURINOL 300 MG/1
TABLET ORAL
Qty: 30 TABLET | Refills: 5 | Status: SHIPPED | OUTPATIENT
Start: 2020-01-14

## 2020-01-14 RX ORDER — LISINOPRIL 20 MG/1
20 TABLET ORAL DAILY
Qty: 30 TABLET | Refills: 5 | Status: ON HOLD
Start: 2020-01-14 | End: 2020-08-18 | Stop reason: HOSPADM

## 2020-01-14 ASSESSMENT — PATIENT HEALTH QUESTIONNAIRE - PHQ9
SUM OF ALL RESPONSES TO PHQ9 QUESTIONS 1 & 2: 0
SUM OF ALL RESPONSES TO PHQ QUESTIONS 1-9: 0
SUM OF ALL RESPONSES TO PHQ QUESTIONS 1-9: 0
2. FEELING DOWN, DEPRESSED OR HOPELESS: 0
1. LITTLE INTEREST OR PLEASURE IN DOING THINGS: 0

## 2020-01-14 NOTE — TELEPHONE ENCOUNTER
Esther Andrade, from [address] phoned regarding norco instructions, take 1 tablet by mouth daily as needed for pain for up to 182 days. If this is correct. Spoke with Dr Roberto Cordova, Dr verified that is how he wants the instructions. Relayed this to the pharmacist whom voiced understanding.

## 2020-01-14 NOTE — PROGRESS NOTES
Vaccine Information Sheet, \"Influenza - Inactivated\"  given to Daxa Perkins, or parent/legal guardian of  Daxa Perkins and verbalized understanding. Patient responses:    Have you ever had a reaction to a flu vaccine? No  Do you have any current illness? No  Have you ever had Guillian Jasper Syndrome? No  Do you have a serious allergy to any of the follow: Neomycin, Polymyxin, Thimerosal, eggs or egg products? No    Flu vaccine given per order. Please see immunization tab. Risks and benefits explained. Current VIS given.

## 2020-01-14 NOTE — PROGRESS NOTES
Internal Medicine Clinic Progress Note    NAME: Nikhil Colon        :  1950        MRN:  [id number]    Chief Complaint   Patient presents with    Hyperlipidemia    Hypertension       History of Present Illness  2020  Presents for routine follow up. No acute issues since last visit. No complaints of chest pain, shortness of breath, fever, chills. 2019  Patient presents for routine follow up. No acute complaints noted. 1/15/2019  Patient presents for routine follow up. Denies any acute complaints. No complaints of chest pain or shortness of breath. Denies fever or chills.      7/3/2018  Patient presents for routine follow up. No acute complaints currently. Review of Systems  Please see HPI above. All bolded are positive.   Gen: fever, chills, fatigue, weakness, diaphoresis, or unintentional weight change  Head: headache, vision change, hearing loss  Chest: chest pain/heaviness, palpitations  Lungs: shortness of breath, wheezing, coughing, hemoptysis  Abdomen: abdominal pain, nausea, vomiting, diarrhea, constipation, melena, hematochezia, hematemesis, or loss of appetite  Extremities: lower extremity edema, myalgias, arthralgias  Urinary: dysuria, hematuria, weak flow, or increase in frequency  Neurologic: lightheadedness, dizziness, confusion, syncope  Psychiatric: depression, suicidal ideation, or anxiety      Past Medical History:   Diagnosis Date    Blurred vision, right eye     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,     Gout     History of cardiovascular stress test 2015    Lexiscan stress test    Hypertension     Kidney stone     stent/    Osteoarthritis        Past Surgical History:   Procedure Laterality Date    LITHOTRIPSY      MANDIBLE SURGERY      OTHER SURGICAL HISTORY Left 2014    EXCISION OF OLECRANON BURSA    OTHER SURGICAL HISTORY  2018    Laparoscopic transabdominal right inguinal hernia repair with mesk and Excision of Lipoma of the Cord    TONSILLECTOMY         Family History  Family History   Problem Relation Age of Onset    Heart Disease Mother     Stroke Mother     Coronary Art Dis Father        Social History      Social History     Tobacco Use    Smoking status: Former Smoker     Packs/day: 0.25     Years: 50.00     Pack years: 12.50     Types: Cigarettes     Last attempt to quit: 2015     Years since quittin.7    Smokeless tobacco: Never Used   Substance Use Topics    Alcohol use: Yes     Alcohol/week: 1.0 standard drinks     Types: 1 Cans of beer per week     Comment: social     Illicit drug use-     Home Medications  Current Outpatient Medications on File Prior to Visit   Medication Sig Dispense Refill    atorvastatin (LIPITOR) 40 MG tablet Take 1 tablet by mouth daily 30 tablet 5    allopurinol (ZYLOPRIM) 300 MG tablet TAKE ONE TABLET BY MOUTH EVERY DAY 30 tablet 3    lisinopril (PRINIVIL;ZESTRIL) 20 MG tablet Take 1 tablet by mouth daily 30 tablet 4    vitamin D (CHOLECALCIFEROL) 1000 UNIT TABS tablet Take 1,000 Units by mouth daily      ibuprofen (ADVIL;MOTRIN) 800 MG tablet Take 1 tablet by mouth every 6 hours as needed for Pain 20 tablet 0    clopidogrel (PLAVIX) 75 MG tablet Take 75 mg by mouth every other day        No current facility-administered medications on file prior to visit. Allergies  No Known Allergies    Objective  Vitals:    20 1246   BP: (!) 148/96   Pulse:    Temp:    SpO2:        Physical Exam:  General: Awake, alert, and oriented to person, place, time, and purpose, appears stated age and cooperative, No acute distress  Head: Normocephalic, atraumatic  Eyes: conjunctivae/corneas clear. EOM's intact.   Mouth: Mucous membranes moist with no pharyngeal exudate or erythema  Neck: no JVD, no adenopathy, symmetrical, trachea midline  Back: symmetric, ROM normal  Lungs: clear to auscultation bilaterally without wheezes, rales, or rhonchi  Heart: regular rate and rhythm, S1, S2 normal, no murmur, click, rub or gallop  Abdomen: soft, non-tender; bowel sounds normal; no masses,  no organomegaly  Extremities:atraumatic, no cyanosis, no edema  Pulses: 2+ pedal pulses palpated  Skin: color, texture, turgor within normal limits. No rashes or lesions or normal  Neurologic:speech appropriate, moves all 4 extremities, normal muscle strength and tone, CN 2-12 grossly intact  Osteopathic/Structural Exam: Examined in seated and supine position. Normal thoracic kyphosis, normal lumbar lordosis. No acute changes. Labs  Lab Results   Component Value Date    WBC 7.4 12/10/2019    HGB 13.7 12/10/2019    HCT 42.2 12/10/2019     12/10/2019     12/10/2019    K 4.7 12/10/2019     12/10/2019    CREATININE 1.0 12/10/2019    BUN 19 12/10/2019    CO2 25 12/10/2019    GLUCOSE 98 12/10/2019    ALT 25 12/10/2019    AST 29 12/10/2019    INR 1.0 08/22/2015     Lab Results   Component Value Date    INR 1.0 08/22/2015    INR 1.0 08/19/2015    PROTIME 12.3 08/22/2015    PROTIME 12.3 08/19/2015      Lab Results   Component Value Date    TSH 2.190 12/06/2017     Lab Results   Component Value Date    TRIG 45 12/10/2019    TRIG 46 06/28/2019    TRIG 34 06/19/2018     Lab Results   Component Value Date    HDL 72 12/10/2019    HDL 67 06/28/2019    HDL 79 06/19/2018     Lab Results   Component Value Date    LDLCALC 69 12/10/2019    LDLCALC 81 06/28/2019    LDLCALC 140 (H) 06/19/2018     Lab Results   Component Value Date    LABA1C 5.5 12/10/2018     All recent labs were reviewed. Please see electronic chart for a more comprehensive set of labs    Radiology  No results found.     Assessment  Patient Active Problem List   Diagnosis    HTN (hypertension)    OA (osteoarthritis)    Hyperlipidemia    Bursal abscess    Gout synovitis    Chronic gout of right elbow    Gouty tophus L elbow    Gout    Anxiety    Carotid artery stenosis    Unilateral inguinal hernia       Plan   1/14/2020 6/29/2018 FIT test negative  AAA US wnl  Influenza vaccine given  Routine blood work ordered  Continue to taper norco/ativan in six month intervals   Checked rx drug monitoring report. No signs of abuse. Elevated calcium. Check pth. Flu vaccine 1/14/2020  Monitor blood pressure    7/9/2019 6/29/2018 FIT test negative  AAA US wnl  LDL improved on lipitor  Influenza vaccine given  Routine blood work ordered  Continue to taper norco/ativan in six month intervals   Checked rx drug monitoring report. Elevated calcium. Recheck vitamin d.     1/15/2019  6/29/2018 FIT test negative  AAA US negative   Start moderate intensity lipitor. Discussed adverse affects and need to discontinue and notify clinic or go to ED if having side effects to medication. CMP in 4 weeks  Lipid panel  Influenza vaccine given  Routine blood work ordered  Continue to taper norco  Checked rx drug monitoring report. 7/3/2018  Abdominal ultrasound ordered for AAA screening  Elevated LDL-- counseled on lifestyle modifications. Consider medication next visit. HTN-- counseled on lifestyle modifications. Consider medication next visit. Tetanus-- unknown . Give booster next visit . Shingles ---refuses vaccine. Pneumovax-- given today. norco and ativan refilled. Continue to taper. Blood work ordered. Return in 6 months. Mixed hyperlipidemia  -     Lipid Panel; Future     Anxiety  -     LORazepam (ATIVAN) 0.5 MG tablet; Take 1 tablet by mouth every 8 hours as needed for Anxiety .     Essential hypertension  -     CBC Auto Differential; Future  -     Comprehensive Metabolic Panel; Future     Buttock pain  -     HYDROcodone-acetaminophen (NORCO) 7.5-325 MG per tablet; Take 1 tablet by mouth every 8 hours as needed for Pain . 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  Earliest Fill Date: +

## 2020-05-12 RX ORDER — ATORVASTATIN CALCIUM 40 MG/1
40 TABLET, FILM COATED ORAL DAILY
Qty: 30 TABLET | Refills: 5 | Status: SHIPPED
Start: 2020-05-12 | End: 2020-06-04 | Stop reason: SDUPTHER

## 2020-06-04 RX ORDER — ATORVASTATIN CALCIUM 40 MG/1
40 TABLET, FILM COATED ORAL DAILY
Qty: 30 TABLET | Refills: 5 | Status: ON HOLD
Start: 2020-06-04 | End: 2020-08-18 | Stop reason: ALTCHOICE

## 2020-06-23 ENCOUNTER — HOSPITAL ENCOUNTER (OUTPATIENT)
Age: 70
Discharge: HOME | End: 2020-06-25
Payer: MEDICARE

## 2020-06-23 LAB
ALBUMIN SERPL-MCNC: 4.6 G/DL (ref 3.5–5.2)
ALP BLD-CCNC: 117 U/L (ref 40–129)
ALT SERPL-CCNC: 25 U/L (ref 0–40)
ANION GAP SERPL CALCULATED.3IONS-SCNC: 16 MMOL/L (ref 7–16)
AST SERPL-CCNC: 28 U/L (ref 0–39)
BILIRUB SERPL-MCNC: 0.7 MG/DL (ref 0–1.2)
BUN BLDV-MCNC: 13 MG/DL (ref 8–23)
CALCIUM SERPL-MCNC: 9.6 MG/DL (ref 8.6–10.2)
CHLORIDE BLD-SCNC: 104 MMOL/L (ref 98–107)
CHOLESTEROL, TOTAL: 145 MG/DL (ref 0–199)
CO2: 22 MMOL/L (ref 22–29)
CREAT SERPL-MCNC: 0.9 MG/DL (ref 0.7–1.2)
GFR AFRICAN AMERICAN: >60
GFR NON-AFRICAN AMERICAN: >60 ML/MIN/1.73
GLUCOSE BLD-MCNC: 104 MG/DL (ref 74–99)
HCT VFR BLD CALC: 40.8 % (ref 37–54)
HDLC SERPL-MCNC: 66 MG/DL
HEMOGLOBIN: 13 G/DL (ref 12.5–16.5)
LDL CHOLESTEROL CALCULATED: 70 MG/DL (ref 0–99)
MCH RBC QN AUTO: 31.3 PG (ref 26–35)
MCHC RBC AUTO-ENTMCNC: 31.9 % (ref 32–34.5)
MCV RBC AUTO: 98.3 FL (ref 80–99.9)
PDW BLD-RTO: 14.5 FL (ref 11.5–15)
PLATELET # BLD: 210 E9/L (ref 130–450)
PMV BLD AUTO: 11.1 FL (ref 7–12)
POTASSIUM SERPL-SCNC: 4.8 MMOL/L (ref 3.5–5)
PROSTATE SPECIFIC ANTIGEN: 0.61 NG/ML (ref 0–4)
RBC # BLD: 4.15 E12/L (ref 3.8–5.8)
SODIUM BLD-SCNC: 142 MMOL/L (ref 132–146)
TOTAL PROTEIN: 7.7 G/DL (ref 6.4–8.3)
TRIGL SERPL-MCNC: 44 MG/DL (ref 0–149)
VLDLC SERPL CALC-MCNC: 9 MG/DL
WBC # BLD: 6.1 E9/L (ref 4.5–11.5)

## 2020-06-23 PROCEDURE — 85027 COMPLETE CBC AUTOMATED: CPT

## 2020-06-23 PROCEDURE — 80053 COMPREHEN METABOLIC PANEL: CPT

## 2020-06-23 PROCEDURE — 80061 LIPID PANEL: CPT

## 2020-06-23 PROCEDURE — G0103 PSA SCREENING: HCPCS

## 2020-08-13 ENCOUNTER — APPOINTMENT (OUTPATIENT)
Dept: GENERAL RADIOLOGY | Age: 70
DRG: 291 | End: 2020-08-13
Payer: MEDICARE

## 2020-08-13 ENCOUNTER — HOSPITAL ENCOUNTER (INPATIENT)
Age: 70
LOS: 5 days | Discharge: ANOTHER HOSPITAL | DRG: 291 | End: 2020-08-18
Attending: EMERGENCY MEDICINE | Admitting: INTERNAL MEDICINE
Payer: MEDICARE

## 2020-08-13 ENCOUNTER — APPOINTMENT (OUTPATIENT)
Dept: ULTRASOUND IMAGING | Age: 70
DRG: 291 | End: 2020-08-13
Payer: MEDICARE

## 2020-08-13 PROBLEM — I50.9 CONGESTIVE HEART FAILURE OF UNKNOWN ETIOLOGY (HCC): Status: ACTIVE | Noted: 2020-08-13

## 2020-08-13 LAB
ALBUMIN SERPL-MCNC: 3.6 G/DL (ref 3.5–5.2)
ALP BLD-CCNC: 129 U/L (ref 40–129)
ALT SERPL-CCNC: 74 U/L (ref 0–40)
ANION GAP SERPL CALCULATED.3IONS-SCNC: 13 MMOL/L (ref 7–16)
AST SERPL-CCNC: 51 U/L (ref 0–39)
BACTERIA: ABNORMAL /HPF
BASOPHILS ABSOLUTE: 0.03 E9/L (ref 0–0.2)
BASOPHILS RELATIVE PERCENT: 0.4 % (ref 0–2)
BILIRUB SERPL-MCNC: 0.9 MG/DL (ref 0–1.2)
BILIRUBIN URINE: NEGATIVE
BLOOD, URINE: ABNORMAL
BUN BLDV-MCNC: 25 MG/DL (ref 8–23)
CALCIUM SERPL-MCNC: 9.2 MG/DL (ref 8.6–10.2)
CHLORIDE BLD-SCNC: 106 MMOL/L (ref 98–107)
CLARITY: CLEAR
CO2: 22 MMOL/L (ref 22–29)
COLOR: YELLOW
CREAT SERPL-MCNC: 1 MG/DL (ref 0.7–1.2)
EOSINOPHILS ABSOLUTE: 0.08 E9/L (ref 0.05–0.5)
EOSINOPHILS RELATIVE PERCENT: 1 % (ref 0–6)
EPITHELIAL CELLS, UA: ABNORMAL /HPF
GFR AFRICAN AMERICAN: >60
GFR NON-AFRICAN AMERICAN: >60 ML/MIN/1.73
GLUCOSE BLD-MCNC: 113 MG/DL (ref 74–99)
GLUCOSE URINE: NEGATIVE MG/DL
HCT VFR BLD CALC: 42.5 % (ref 37–54)
HEMOGLOBIN: 13.9 G/DL (ref 12.5–16.5)
IMMATURE GRANULOCYTES #: 0.02 E9/L
IMMATURE GRANULOCYTES %: 0.3 % (ref 0–5)
KETONES, URINE: NEGATIVE MG/DL
LEUKOCYTE ESTERASE, URINE: NEGATIVE
LYMPHOCYTES ABSOLUTE: 1.52 E9/L (ref 1.5–4)
LYMPHOCYTES RELATIVE PERCENT: 19.2 % (ref 20–42)
MCH RBC QN AUTO: 31.5 PG (ref 26–35)
MCHC RBC AUTO-ENTMCNC: 32.7 % (ref 32–34.5)
MCV RBC AUTO: 96.4 FL (ref 80–99.9)
MONOCYTES ABSOLUTE: 0.6 E9/L (ref 0.1–0.95)
MONOCYTES RELATIVE PERCENT: 7.6 % (ref 2–12)
NEUTROPHILS ABSOLUTE: 5.66 E9/L (ref 1.8–7.3)
NEUTROPHILS RELATIVE PERCENT: 71.5 % (ref 43–80)
NITRITE, URINE: NEGATIVE
PDW BLD-RTO: 14.6 FL (ref 11.5–15)
PH UA: 5.5 (ref 5–9)
PLATELET # BLD: 220 E9/L (ref 130–450)
PMV BLD AUTO: 11.4 FL (ref 7–12)
POTASSIUM REFLEX MAGNESIUM: 4.4 MMOL/L (ref 3.5–5)
PRO-BNP: ABNORMAL PG/ML (ref 0–125)
PROTEIN UA: ABNORMAL MG/DL
RBC # BLD: 4.41 E12/L (ref 3.8–5.8)
RBC UA: >20 /HPF (ref 0–2)
SODIUM BLD-SCNC: 141 MMOL/L (ref 132–146)
SPECIFIC GRAVITY UA: 1.02 (ref 1–1.03)
TOTAL PROTEIN: 6.2 G/DL (ref 6.4–8.3)
TROPONIN: 0.05 NG/ML (ref 0–0.03)
UROBILINOGEN, URINE: 1 E.U./DL
WBC # BLD: 7.9 E9/L (ref 4.5–11.5)
WBC UA: ABNORMAL /HPF (ref 0–5)

## 2020-08-13 PROCEDURE — 85025 COMPLETE CBC W/AUTO DIFF WBC: CPT

## 2020-08-13 PROCEDURE — 71045 X-RAY EXAM CHEST 1 VIEW: CPT

## 2020-08-13 PROCEDURE — 99283 EMERGENCY DEPT VISIT LOW MDM: CPT

## 2020-08-13 PROCEDURE — 84484 ASSAY OF TROPONIN QUANT: CPT

## 2020-08-13 PROCEDURE — 6370000000 HC RX 637 (ALT 250 FOR IP): Performed by: INTERNAL MEDICINE

## 2020-08-13 PROCEDURE — 93005 ELECTROCARDIOGRAM TRACING: CPT | Performed by: PHYSICIAN ASSISTANT

## 2020-08-13 PROCEDURE — 80053 COMPREHEN METABOLIC PANEL: CPT

## 2020-08-13 PROCEDURE — 6370000000 HC RX 637 (ALT 250 FOR IP): Performed by: PHYSICIAN ASSISTANT

## 2020-08-13 PROCEDURE — 83880 ASSAY OF NATRIURETIC PEPTIDE: CPT

## 2020-08-13 PROCEDURE — 2060000000 HC ICU INTERMEDIATE R&B

## 2020-08-13 PROCEDURE — 93970 EXTREMITY STUDY: CPT

## 2020-08-13 PROCEDURE — 81001 URINALYSIS AUTO W/SCOPE: CPT

## 2020-08-13 RX ORDER — POTASSIUM CHLORIDE 20 MEQ/1
20 TABLET, EXTENDED RELEASE ORAL 2 TIMES DAILY WITH MEALS
Status: DISCONTINUED | OUTPATIENT
Start: 2020-08-14 | End: 2020-08-17

## 2020-08-13 RX ORDER — ASPIRIN 81 MG/1
162 TABLET, CHEWABLE ORAL ONCE
Status: COMPLETED | OUTPATIENT
Start: 2020-08-13 | End: 2020-08-13

## 2020-08-13 RX ORDER — ATORVASTATIN CALCIUM 40 MG/1
40 TABLET, FILM COATED ORAL DAILY
Status: DISCONTINUED | OUTPATIENT
Start: 2020-08-14 | End: 2020-08-18 | Stop reason: HOSPADM

## 2020-08-13 RX ORDER — LORAZEPAM 0.5 MG/1
0.5 TABLET ORAL EVERY 6 HOURS PRN
COMMUNITY
End: 2021-01-01

## 2020-08-13 RX ORDER — VITAMIN B COMPLEX
1000 TABLET ORAL DAILY
Status: DISCONTINUED | OUTPATIENT
Start: 2020-08-14 | End: 2020-08-18 | Stop reason: HOSPADM

## 2020-08-13 RX ORDER — FUROSEMIDE 10 MG/ML
40 INJECTION INTRAMUSCULAR; INTRAVENOUS 2 TIMES DAILY
Status: DISCONTINUED | OUTPATIENT
Start: 2020-08-14 | End: 2020-08-16

## 2020-08-13 RX ORDER — IBUPROFEN 800 MG/1
800 TABLET ORAL EVERY 6 HOURS PRN
Status: DISCONTINUED | OUTPATIENT
Start: 2020-08-13 | End: 2020-08-18 | Stop reason: HOSPADM

## 2020-08-13 RX ORDER — CLOPIDOGREL BISULFATE 75 MG/1
75 TABLET ORAL EVERY OTHER DAY
Status: DISCONTINUED | OUTPATIENT
Start: 2020-08-15 | End: 2020-08-14

## 2020-08-13 RX ORDER — LISINOPRIL 20 MG/1
20 TABLET ORAL DAILY
Status: DISCONTINUED | OUTPATIENT
Start: 2020-08-14 | End: 2020-08-14

## 2020-08-13 RX ORDER — CITALOPRAM 20 MG/1
10 TABLET ORAL DAILY
Status: DISCONTINUED | OUTPATIENT
Start: 2020-08-14 | End: 2020-08-18 | Stop reason: HOSPADM

## 2020-08-13 RX ORDER — CITALOPRAM 10 MG/1
10 TABLET ORAL DAILY
COMMUNITY

## 2020-08-13 RX ORDER — ALLOPURINOL 300 MG/1
300 TABLET ORAL DAILY
Status: DISCONTINUED | OUTPATIENT
Start: 2020-08-14 | End: 2020-08-18 | Stop reason: HOSPADM

## 2020-08-13 RX ORDER — ACETAMINOPHEN 500 MG
500 TABLET ORAL EVERY 6 HOURS PRN
Status: DISCONTINUED | OUTPATIENT
Start: 2020-08-13 | End: 2020-08-18 | Stop reason: HOSPADM

## 2020-08-13 RX ORDER — LORAZEPAM 0.5 MG/1
0.5 TABLET ORAL EVERY 6 HOURS PRN
Status: DISCONTINUED | OUTPATIENT
Start: 2020-08-13 | End: 2020-08-18 | Stop reason: HOSPADM

## 2020-08-13 RX ADMIN — ASPIRIN 81 MG CHEWABLE TABLET 162 MG: 81 TABLET CHEWABLE at 20:18

## 2020-08-13 RX ADMIN — LORAZEPAM 0.5 MG: 0.5 TABLET ORAL at 23:40

## 2020-08-13 RX ADMIN — NITROGLYCERIN 0.5 INCH: 20 OINTMENT TOPICAL at 23:41

## 2020-08-13 ASSESSMENT — PAIN SCALES - GENERAL
PAINLEVEL_OUTOF10: 0
PAINLEVEL_OUTOF10: 0

## 2020-08-13 NOTE — ED PROVIDER NOTES
ED Attending  CC: No    HPI:  8/13/20,   Time: 6:35 PM EDT       Yuridia Prince is a 78 y.o. male presenting to the ED for anxiety, beginning 5/28/2020 after his partner, Lisandra Horton, passed away. The complaint has been persistent, moderate in severity, and worsened by nothing. Patient states that he has followed up with his PCP for his anxiety and depression since the passing of his significant other. He was started on Celexa and Ativan as needed. Reports the Ativan does help on occasion but not always. Unsure if the Celexa is helping at all with his symptoms. He denies any suicidal or homicidal thoughts. Does state that his partner's family lives in town and has been spending some time with him which is been helpful however he does feel that his symptoms have not been improving and possibly worsening since May. Patient also has been reporting exertional dyspnea over the last several weeks. States that if he walks up the stairs he can hardly breathe by the time he reaches the top. Also states that he has been having bilateral lower leg swelling for the last several months. He does not have a history of pulmonary or cardiac pathology. Patient denies any leg pain but does report that his legs are achy by the end of the day. No history of blood clots or pulmonary emboli. Patient denies chest pain. Patient has been afebrile at home without recent travel or sick contacts. Patient denies all other symptoms at this time. Review of Systems:   Pertinent positives and negatives are stated within HPI, all other systems reviewed and are negative.          --------------------------------------------- PAST HISTORY ---------------------------------------------  Past Medical History:  has a past medical history of Blurred vision, right eye, Gout, History of cardiovascular stress test, Hypertension, Kidney stone, and Osteoarthritis.     Past Surgical History:  has a past surgical history that includes Mandible surgery; Lithotripsy; other surgical history (Left, 5/8/2014); Tonsillectomy; and other surgical history (02/08/2018). Social History:  reports that he quit smoking about 5 years ago. His smoking use included cigarettes. He has a 12.50 pack-year smoking history. He has never used smokeless tobacco. He reports current alcohol use of about 1.0 standard drinks of alcohol per week. He reports that he does not use drugs. Family History: family history includes Coronary Art Dis in his father; Heart Disease in his mother; Stroke in his mother. The patients home medications have been reviewed. Allergies: Patient has no known allergies. ---------------------------------------------------PHYSICAL EXAM--------------------------------------    Constitutional/General: Alert and oriented x3, well appearing, non toxic in NAD  Head: Normocephalic and atraumatic  Eyes: PERRL, EOMI, conjunctive normal, sclera non icteric  Mouth: Oropharynx clear, handling secretions, no trismus, no asymmetry of the posterior oropharynx or uvular edema  Neck: Supple, full ROM, non tender to palpation in the midline, no stridor, no crepitus, no meningeal signs  Respiratory: Lungs clear to auscultation bilaterally, no wheezes, rales, or rhonchi. Not in respiratory distress  Cardiovascular:  Regular rate. Regular rhythm. No murmurs, gallops, or rubs. 2+ distal pulses  Chest: No chest wall tenderness  GI:  Abdomen Soft, Non tender, Non distended. +BS. No organomegaly, no palpable masses,  No rebound, guarding, or rigidity. Musculoskeletal: Moves all extremities x 4. Warm and well perfused, no clubbing, cyanosis, or edema. Capillary refill <3 seconds  Integument: skin warm and dry. No rashes.    Lymphatic: no lymphadenopathy noted  Neurologic: GCS 15, no focal deficits, symmetric strength 5/5 in the upper and lower extremities bilaterally  Psychiatric: Normal Affect    -------------------------------------------------- RESULTS -------------------------------------------------  I have personally reviewed all laboratory and imaging results for this patient. Results are listed below.      LABS:  Results for orders placed or performed during the hospital encounter of 08/13/20   CBC Auto Differential   Result Value Ref Range    WBC 7.9 4.5 - 11.5 E9/L    RBC 4.41 3.80 - 5.80 E12/L    Hemoglobin 13.9 12.5 - 16.5 g/dL    Hematocrit 42.5 37.0 - 54.0 %    MCV 96.4 80.0 - 99.9 fL    MCH 31.5 26.0 - 35.0 pg    MCHC 32.7 32.0 - 34.5 %    RDW 14.6 11.5 - 15.0 fL    Platelets 853 889 - 359 E9/L    MPV 11.4 7.0 - 12.0 fL    Neutrophils % 71.5 43.0 - 80.0 %    Immature Granulocytes % 0.3 0.0 - 5.0 %    Lymphocytes % 19.2 (L) 20.0 - 42.0 %    Monocytes % 7.6 2.0 - 12.0 %    Eosinophils % 1.0 0.0 - 6.0 %    Basophils % 0.4 0.0 - 2.0 %    Neutrophils Absolute 5.66 1.80 - 7.30 E9/L    Immature Granulocytes # 0.02 E9/L    Lymphocytes Absolute 1.52 1.50 - 4.00 E9/L    Monocytes Absolute 0.60 0.10 - 0.95 E9/L    Eosinophils Absolute 0.08 0.05 - 0.50 E9/L    Basophils Absolute 0.03 0.00 - 0.20 E9/L   Comprehensive Metabolic Panel w/ Reflex to MG   Result Value Ref Range    Sodium 141 132 - 146 mmol/L    Potassium reflex Magnesium 4.4 3.5 - 5.0 mmol/L    Chloride 106 98 - 107 mmol/L    CO2 22 22 - 29 mmol/L    Anion Gap 13 7 - 16 mmol/L    Glucose 113 (H) 74 - 99 mg/dL    BUN 25 (H) 8 - 23 mg/dL    CREATININE 1.0 0.7 - 1.2 mg/dL    GFR Non-African American >60 >=60 mL/min/1.73    GFR African American >60     Calcium 9.2 8.6 - 10.2 mg/dL    Total Protein 6.2 (L) 6.4 - 8.3 g/dL    Alb 3.6 3.5 - 5.2 g/dL    Total Bilirubin 0.9 0.0 - 1.2 mg/dL    Alkaline Phosphatase 129 40 - 129 U/L    ALT 74 (H) 0 - 40 U/L    AST 51 (H) 0 - 39 U/L   Troponin   Result Value Ref Range    Troponin 0.05 (H) 0.00 - 0.03 ng/mL   Brain Natriuretic Peptide   Result Value Ref Range    Pro-BNP 11,227 (H) 0 - 125 pg/mL   Urinalysis, reflex to microscopic   Result Value Ref Range    Color, UA ------------------------------ ED COURSE/MEDICAL DECISION MAKING----------------------  Medications   clopidogrel (PLAVIX) tablet 75 mg (has no administration in time range)   ibuprofen (ADVIL;MOTRIN) tablet 800 mg (has no administration in time range)   lisinopril (PRINIVIL;ZESTRIL) tablet 20 mg (has no administration in time range)   allopurinol (ZYLOPRIM) tablet 300 mg (has no administration in time range)   atorvastatin (LIPITOR) tablet 40 mg (has no administration in time range)   citalopram (CELEXA) tablet 10 mg (has no administration in time range)   LORazepam (ATIVAN) tablet 0.5 mg (has no administration in time range)   vitamin D (CHOLECALCIFEROL) tablet 1,000 Units (has no administration in time range)   acetaminophen (TYLENOL) tablet 500 mg (has no administration in time range)   nitroglycerin (NITRO-BID) 2 % ointment 0.5 inch (has no administration in time range)   trimethobenzamide (TIGAN) injection 200 mg (has no administration in time range)   magnesium hydroxide (MILK OF MAGNESIA) 400 MG/5ML suspension 30 mL (has no administration in time range)   furosemide (LASIX) injection 40 mg (has no administration in time range)   potassium chloride (KLOR-CON M) extended release tablet 20 mEq (has no administration in time range)   aspirin chewable tablet 162 mg (162 mg Oral Given 8/13/20 2018)         ED COURSE:  ED Course as of Aug 13 2226   Thu Aug 13, 2020   2003 Spoke with Dr. Radha Vasquez regarding patient's hospital admission. Requesting telemetry admission. No cardiology consultation required in ED at this time. [MS]      ED Course User Index  [MS] Phoebe Diaz       Medical Decision Making:    Patient is a 79-year-old male presenting emergency department for anxiety and depression after his partner passed away. Patient also is describing exertional dyspnea and leg swelling for the last several weeks.   Patient was found to have bilateral pleural effusions, elevated BNP, and slightly elevated troponin in the emergency department. No previous cardiac or pulmonary pathology noted on his medical history. We will plan for hospital admission for CHF. Patient was also found to have EKG changes which he will be monitored for as well in the hospital.      This patient's ED course included: a personal history and physicial examination, multiple bedside re-evaluations and cardiac monitoring    This patient has remained hemodynamically stable during their ED course. Re-Evaluations:             Re-evaluation. Patients symptoms are improving    Re-examination  8/13/20   6:35 PM EDT          Vital Signs:   Vitals:    08/13/20 1729 08/13/20 1741 08/13/20 2022 08/13/20 2045   BP:  106/74 120/86 123/84   Pulse:  96 99 99   Resp:  16 17 18   Temp: 97.5 °F (36.4 °C) 97.6 °F (36.4 °C) 98 °F (36.7 °C) 97.5 °F (36.4 °C)   TempSrc: Temporal  Oral Oral   SpO2:  97% 99% 99%   Weight:  144 lb (65.3 kg)     Height:  5' 9\" (1.753 m)         Consultations:             Dr. Daina Lozada to admission    Critical Care: None        Counseling: The emergency provider has spoken with the patient and discussed todays results, in addition to providing specific details for the plan of care and counseling regarding the diagnosis and prognosis. Questions are answered at this time and they are agreeable with the plan.       --------------------------------- IMPRESSION AND DISPOSITION ---------------------------------    IMPRESSION  1. Congestive heart failure, unspecified HF chronicity, unspecified [facility] failure type [facility]        DISPOSITION  Disposition: Admit to telemetry  Patient condition is stable    NOTE: This report was transcribed using voice recognition software.  Every effort was made to ensure accuracy; however, inadvertent computerized transcription errors may be present       Nirali Arriagama  08/13/20 2029

## 2020-08-14 LAB
ALBUMIN SERPL-MCNC: 3.8 G/DL (ref 3.5–5.2)
ALP BLD-CCNC: 137 U/L (ref 40–129)
ALT SERPL-CCNC: 73 U/L (ref 0–40)
ANION GAP SERPL CALCULATED.3IONS-SCNC: 17 MMOL/L (ref 7–16)
APTT: 136 SEC (ref 24.5–35.1)
APTT: 22 SEC (ref 24.5–35.1)
AST SERPL-CCNC: 51 U/L (ref 0–39)
BASOPHILS ABSOLUTE: 0.03 E9/L (ref 0–0.2)
BASOPHILS RELATIVE PERCENT: 0.4 % (ref 0–2)
BILIRUB SERPL-MCNC: 0.9 MG/DL (ref 0–1.2)
BUN BLDV-MCNC: 21 MG/DL (ref 8–23)
CALCIUM SERPL-MCNC: 9.1 MG/DL (ref 8.6–10.2)
CHLORIDE BLD-SCNC: 104 MMOL/L (ref 98–107)
CHOLESTEROL, TOTAL: 182 MG/DL (ref 0–199)
CO2: 21 MMOL/L (ref 22–29)
CREAT SERPL-MCNC: 0.9 MG/DL (ref 0.7–1.2)
EKG ATRIAL RATE: 101 BPM
EKG P AXIS: 33 DEGREES
EKG P-R INTERVAL: 188 MS
EKG Q-T INTERVAL: 320 MS
EKG QRS DURATION: 120 MS
EKG QTC CALCULATION (BAZETT): 428 MS
EKG R AXIS: -83 DEGREES
EKG T AXIS: 69 DEGREES
EKG VENTRICULAR RATE: 108 BPM
EOSINOPHILS ABSOLUTE: 0.09 E9/L (ref 0.05–0.5)
EOSINOPHILS RELATIVE PERCENT: 1.2 % (ref 0–6)
GFR AFRICAN AMERICAN: >60
GFR NON-AFRICAN AMERICAN: >60 ML/MIN/1.73
GLUCOSE BLD-MCNC: 114 MG/DL (ref 74–99)
HCT VFR BLD CALC: 40.9 % (ref 37–54)
HCT VFR BLD CALC: 43.4 % (ref 37–54)
HDLC SERPL-MCNC: 55 MG/DL
HEMOGLOBIN: 13.4 G/DL (ref 12.5–16.5)
HEMOGLOBIN: 14.3 G/DL (ref 12.5–16.5)
IMMATURE GRANULOCYTES #: 0.03 E9/L
IMMATURE GRANULOCYTES %: 0.4 % (ref 0–5)
LDL CHOLESTEROL CALCULATED: 114 MG/DL (ref 0–99)
LV EF: 18 %
LVEF MODALITY: NORMAL
LYMPHOCYTES ABSOLUTE: 1.26 E9/L (ref 1.5–4)
LYMPHOCYTES RELATIVE PERCENT: 16.3 % (ref 20–42)
MAGNESIUM: 1.7 MG/DL (ref 1.6–2.6)
MCH RBC QN AUTO: 31.5 PG (ref 26–35)
MCH RBC QN AUTO: 31.6 PG (ref 26–35)
MCHC RBC AUTO-ENTMCNC: 32.8 % (ref 32–34.5)
MCHC RBC AUTO-ENTMCNC: 32.9 % (ref 32–34.5)
MCV RBC AUTO: 95.8 FL (ref 80–99.9)
MCV RBC AUTO: 96 FL (ref 80–99.9)
MONOCYTES ABSOLUTE: 0.55 E9/L (ref 0.1–0.95)
MONOCYTES RELATIVE PERCENT: 7.1 % (ref 2–12)
NEUTROPHILS ABSOLUTE: 5.78 E9/L (ref 1.8–7.3)
NEUTROPHILS RELATIVE PERCENT: 74.6 % (ref 43–80)
PDW BLD-RTO: 14.7 FL (ref 11.5–15)
PDW BLD-RTO: 14.8 FL (ref 11.5–15)
PHOSPHORUS: 2.8 MG/DL (ref 2.5–4.5)
PLATELET # BLD: 210 E9/L (ref 130–450)
PLATELET # BLD: 211 E9/L (ref 130–450)
PMV BLD AUTO: 11 FL (ref 7–12)
PMV BLD AUTO: 11.3 FL (ref 7–12)
POTASSIUM SERPL-SCNC: 3.7 MMOL/L (ref 3.5–5)
PROCALCITONIN: 0.11 NG/ML (ref 0–0.08)
RBC # BLD: 4.26 E12/L (ref 3.8–5.8)
RBC # BLD: 4.53 E12/L (ref 3.8–5.8)
SODIUM BLD-SCNC: 142 MMOL/L (ref 132–146)
TOTAL PROTEIN: 6.4 G/DL (ref 6.4–8.3)
TRIGL SERPL-MCNC: 64 MG/DL (ref 0–149)
TROPONIN: 0.05 NG/ML (ref 0–0.03)
VLDLC SERPL CALC-MCNC: 13 MG/DL
WBC # BLD: 7.7 E9/L (ref 4.5–11.5)
WBC # BLD: 7.7 E9/L (ref 4.5–11.5)

## 2020-08-14 PROCEDURE — 6370000000 HC RX 637 (ALT 250 FOR IP): Performed by: INTERNAL MEDICINE

## 2020-08-14 PROCEDURE — 6360000004 HC RX CONTRAST MEDICATION: Performed by: INTERNAL MEDICINE

## 2020-08-14 PROCEDURE — 2700000000 HC OXYGEN THERAPY PER DAY

## 2020-08-14 PROCEDURE — 80061 LIPID PANEL: CPT

## 2020-08-14 PROCEDURE — 80053 COMPREHEN METABOLIC PANEL: CPT

## 2020-08-14 PROCEDURE — 84100 ASSAY OF PHOSPHORUS: CPT

## 2020-08-14 PROCEDURE — 36415 COLL VENOUS BLD VENIPUNCTURE: CPT

## 2020-08-14 PROCEDURE — 6360000002 HC RX W HCPCS: Performed by: INTERNAL MEDICINE

## 2020-08-14 PROCEDURE — 2060000000 HC ICU INTERMEDIATE R&B

## 2020-08-14 PROCEDURE — 84484 ASSAY OF TROPONIN QUANT: CPT

## 2020-08-14 PROCEDURE — 85730 THROMBOPLASTIN TIME PARTIAL: CPT

## 2020-08-14 PROCEDURE — APPSS60 APP SPLIT SHARED TIME 46-60 MINUTES: Performed by: PHYSICIAN ASSISTANT

## 2020-08-14 PROCEDURE — 85027 COMPLETE CBC AUTOMATED: CPT

## 2020-08-14 PROCEDURE — 6370000000 HC RX 637 (ALT 250 FOR IP): Performed by: PHYSICIAN ASSISTANT

## 2020-08-14 PROCEDURE — 83735 ASSAY OF MAGNESIUM: CPT

## 2020-08-14 PROCEDURE — 6360000002 HC RX W HCPCS: Performed by: PHYSICIAN ASSISTANT

## 2020-08-14 PROCEDURE — 84145 PROCALCITONIN (PCT): CPT

## 2020-08-14 PROCEDURE — 99223 1ST HOSP IP/OBS HIGH 75: CPT | Performed by: INTERNAL MEDICINE

## 2020-08-14 PROCEDURE — C8929 TTE W OR WO FOL WCON,DOPPLER: HCPCS

## 2020-08-14 PROCEDURE — 85025 COMPLETE CBC W/AUTO DIFF WBC: CPT

## 2020-08-14 RX ORDER — LISINOPRIL 5 MG/1
5 TABLET ORAL DAILY
Status: DISCONTINUED | OUTPATIENT
Start: 2020-08-14 | End: 2020-08-14

## 2020-08-14 RX ORDER — HEPARIN SODIUM 1000 [USP'U]/ML
40 INJECTION, SOLUTION INTRAVENOUS; SUBCUTANEOUS PRN
Status: DISCONTINUED | OUTPATIENT
Start: 2020-08-14 | End: 2020-08-18 | Stop reason: HOSPADM

## 2020-08-14 RX ORDER — HEPARIN SODIUM 1000 [USP'U]/ML
80 INJECTION, SOLUTION INTRAVENOUS; SUBCUTANEOUS ONCE
Status: COMPLETED | OUTPATIENT
Start: 2020-08-14 | End: 2020-08-14

## 2020-08-14 RX ORDER — HEPARIN SODIUM 1000 [USP'U]/ML
80 INJECTION, SOLUTION INTRAVENOUS; SUBCUTANEOUS PRN
Status: DISCONTINUED | OUTPATIENT
Start: 2020-08-14 | End: 2020-08-18 | Stop reason: HOSPADM

## 2020-08-14 RX ORDER — METOPROLOL SUCCINATE 50 MG/1
50 TABLET, EXTENDED RELEASE ORAL DAILY
Status: DISCONTINUED | OUTPATIENT
Start: 2020-08-14 | End: 2020-08-15

## 2020-08-14 RX ORDER — MAGNESIUM SULFATE IN WATER 40 MG/ML
2 INJECTION, SOLUTION INTRAVENOUS ONCE
Status: COMPLETED | OUTPATIENT
Start: 2020-08-14 | End: 2020-08-14

## 2020-08-14 RX ORDER — ASPIRIN 81 MG/1
81 TABLET, CHEWABLE ORAL DAILY
Status: DISCONTINUED | OUTPATIENT
Start: 2020-08-14 | End: 2020-08-18 | Stop reason: HOSPADM

## 2020-08-14 RX ORDER — HEPARIN SODIUM 10000 [USP'U]/100ML
18 INJECTION, SOLUTION INTRAVENOUS CONTINUOUS
Status: DISCONTINUED | OUTPATIENT
Start: 2020-08-14 | End: 2020-08-18 | Stop reason: HOSPADM

## 2020-08-14 RX ADMIN — FUROSEMIDE 40 MG: 10 INJECTION INTRAMUSCULAR; INTRAVENOUS at 05:14

## 2020-08-14 RX ADMIN — METOPROLOL TARTRATE 25 MG: 25 TABLET, FILM COATED ORAL at 09:18

## 2020-08-14 RX ADMIN — ASPIRIN 81 MG CHEWABLE TABLET 81 MG: 81 TABLET CHEWABLE at 15:02

## 2020-08-14 RX ADMIN — NITROGLYCERIN 0.5 INCH: 20 OINTMENT TOPICAL at 17:39

## 2020-08-14 RX ADMIN — CITALOPRAM HYDROBROMIDE 10 MG: 20 TABLET ORAL at 09:19

## 2020-08-14 RX ADMIN — NITROGLYCERIN 0.5 INCH: 20 OINTMENT TOPICAL at 12:12

## 2020-08-14 RX ADMIN — FUROSEMIDE 40 MG: 10 INJECTION INTRAMUSCULAR; INTRAVENOUS at 17:39

## 2020-08-14 RX ADMIN — MAGNESIUM SULFATE HEPTAHYDRATE 2 G: 40 INJECTION, SOLUTION INTRAVENOUS at 13:55

## 2020-08-14 RX ADMIN — METOPROLOL SUCCINATE 50 MG: 50 TABLET, EXTENDED RELEASE ORAL at 15:02

## 2020-08-14 RX ADMIN — PERFLUTREN 2.2 MG: 6.52 INJECTION, SUSPENSION INTRAVENOUS at 11:10

## 2020-08-14 RX ADMIN — ATORVASTATIN CALCIUM 40 MG: 40 TABLET, FILM COATED ORAL at 09:18

## 2020-08-14 RX ADMIN — HEPARIN SODIUM 18 UNITS/KG/HR: 10000 INJECTION, SOLUTION INTRAVENOUS at 15:10

## 2020-08-14 RX ADMIN — HEPARIN SODIUM 5220 UNITS: 1000 INJECTION INTRAVENOUS; SUBCUTANEOUS at 15:03

## 2020-08-14 RX ADMIN — POTASSIUM CHLORIDE 20 MEQ: 20 TABLET, EXTENDED RELEASE ORAL at 17:39

## 2020-08-14 RX ADMIN — LORAZEPAM 0.5 MG: 0.5 TABLET ORAL at 20:33

## 2020-08-14 RX ADMIN — NITROGLYCERIN 0.5 INCH: 20 OINTMENT TOPICAL at 06:46

## 2020-08-14 RX ADMIN — CHOLECALCIFEROL TAB 25 MCG (1000 UNIT) 1000 UNITS: 25 TAB at 09:22

## 2020-08-14 RX ADMIN — ALLOPURINOL 300 MG: 300 TABLET ORAL at 09:19

## 2020-08-14 RX ADMIN — LISINOPRIL 5 MG: 5 TABLET ORAL at 09:17

## 2020-08-14 RX ADMIN — LORAZEPAM 0.5 MG: 0.5 TABLET ORAL at 12:55

## 2020-08-14 RX ADMIN — CARBAMIDE PEROXIDE 6.5% 5 DROP: 6.5 LIQUID AURICULAR (OTIC) at 20:33

## 2020-08-14 RX ADMIN — CARBAMIDE PEROXIDE 6.5% 5 DROP: 6.5 LIQUID AURICULAR (OTIC) at 13:54

## 2020-08-14 RX ADMIN — POTASSIUM CHLORIDE 20 MEQ: 20 TABLET, EXTENDED RELEASE ORAL at 09:19

## 2020-08-14 ASSESSMENT — PAIN SCALES - GENERAL
PAINLEVEL_OUTOF10: 0

## 2020-08-14 NOTE — CONSULTS
Inpatient Cardiology Consultation      Reason for Consult: Acute systolic CHF, new-onset cardiomyopathy    Consulting Physician: Dr. Desi Hopper    Requesting Physician: Dr. Shirley Cole    Date of Consultation: 8/14/2020    HISTORY OF PRESENT ILLNESS:   Patient is a pleasant 69-year-old white male not previously known to [facility] cardiology. He is being seen in initial consultation by Dr. Desi Hopper for evaluation and recommendations regarding acute systolic heart failure and new cardiomyopathy. Patient has a past medical history of hypertension, anxiety, depression, hyperlipidemia, gout as well as carotid artery disease with left sided stent placement done in 2015 (details as noted below in past medical history). Patient denies ever seeing a cardiologist in the past, and denies any personal history of coronary artery disease, myocardial infarction, valvular heart disease, heart failure or cardiac arrhythmia. He states he had stress testing years back and was told it was normal at that time. He had no further cardiac evaluation done after that. Patient presented to [address] [place] [address] on August 13, 2020 due to complaints of worsening shortness of breath and bilateral lower extremity edema. Patient states that around the end of May 2020, he had been noticing increased anxiety due to the passing of his significant other. He began to notice dyspnea on exertion and sometimes dyspnea at rest.  He states he would wake up at night short of breath. He originally attributed these symptoms to his anxiety. He also began to notice lower extremity edema bilaterally. He admits to decreased appetite since around May, but again attributed that to his anxiety and depression given his recent loss of a loved one. He states that initially around the end of May he lost around 15 to 20 pounds, but over the past month has noticed a 10 pound weight gain without increase in appetite.   He denies any complaints of chest discomfort, nausea, obtained for purposes of left ventricular ejection fraction determination.  Left ventricular ejection fraction is calculated at 45%.     6. Carotid artery disease s/p stent placement of left distal common carotid artery, stent placement of the proximal internal left carotid artery in 2015 by Dr. Fanta Muller. PAST SURGICAL HISTORY:    Past Surgical History:   Procedure Laterality Date    LITHOTRIPSY      MANDIBLE SURGERY      OTHER SURGICAL HISTORY Left 5/8/2014    EXCISION OF OLECRANON BURSA    OTHER SURGICAL HISTORY  02/08/2018    Laparoscopic transabdominal right inguinal hernia repair with mesk and Excision of Lipoma of the Cord    TONSILLECTOMY         HOME MEDICATIONS:  Prior to Admission medications    Medication Sig Start Date End Date Taking? Authorizing Provider   citalopram (CELEXA) 10 MG tablet Take 10 mg by mouth daily   Yes Historical Provider, MD   LORazepam (ATIVAN) 0.5 MG tablet Take 0.5 mg by mouth every 6 hours as needed for Anxiety.    Yes Historical Provider, MD   atorvastatin (LIPITOR) 40 MG tablet Take 1 tablet by mouth daily 6/4/20  Yes Juice Cordova DO   lisinopril (PRINIVIL;ZESTRIL) 20 MG tablet Take 1 tablet by mouth daily 1/14/20  Yes Remy Hatch DO   allopurinol (ZYLOPRIM) 300 MG tablet TAKE ONE TABLET BY MOUTH EVERY DAY 1/14/20  Yes Remy Hatch DO   clopidogrel (PLAVIX) 75 MG tablet Take 75 mg by mouth every other day    Yes Historical Provider, MD   ibuprofen (ADVIL;MOTRIN) 800 MG tablet Take 1 tablet by mouth every 6 hours as needed for Pain 2/8/18   Elizabeth Negrete MD       CURRENT MEDICATIONS:      Current Facility-Administered Medications:     lisinopril (PRINIVIL;ZESTRIL) tablet 5 mg, 5 mg, Oral, Daily, Atul Narvaez, DO, 5 mg at 08/14/20 3143    metoprolol tartrate (LOPRESSOR) tablet 25 mg, 25 mg, Oral, BID, Shankar Russell, DO, 25 mg at 08/14/20 3290    magnesium sulfate 2 g in 50 mL IVPB premix, 2 g, Intravenous, Once, Atul Narvaez, DO   carbamide peroxide (DEBROX) 6.5 % otic solution 5 drop, 5 drop, Right Ear, BID, Shane Gram, DO    [START ON 8/15/2020] clopidogrel (PLAVIX) tablet 75 mg, 75 mg, Oral, Every Other Day, Shane Gram, DO    ibuprofen (ADVIL;MOTRIN) tablet 800 mg, 800 mg, Oral, Q6H PRN, Shane Gram, DO    allopurinol (ZYLOPRIM) tablet 300 mg, 300 mg, Oral, Daily, Shane Gram, DO, 300 mg at 08/14/20 0919    atorvastatin (LIPITOR) tablet 40 mg, 40 mg, Oral, Daily, Shane Gram, DO, 40 mg at 08/14/20 0635    citalopram (CELEXA) tablet 10 mg, 10 mg, Oral, Daily, Sahne Gram, DO, 10 mg at 08/14/20 0919    LORazepam (ATIVAN) tablet 0.5 mg, 0.5 mg, Oral, Q6H PRN, Sahne Gram, DO, 0.5 mg at 08/13/20 2340    vitamin D (CHOLECALCIFEROL) tablet 1,000 Units, 1,000 Units, Oral, Daily, Shane Gram, DO, 1,000 Units at 08/14/20 6158    acetaminophen (TYLENOL) tablet 500 mg, 500 mg, Oral, Q6H PRN, Shane Gram, DO    nitroglycerin (NITRO-BID) 2 % ointment 0.5 inch, 0.5 inch, Topical, 4 times per day, Shane Gram, DO, 0.5 inch at 08/14/20 1212    trimethobenzamide (TIGAN) injection 200 mg, 200 mg, Intramuscular, Q6H PRN, Shane Gram, DO    magnesium hydroxide (MILK OF MAGNESIA) 400 MG/5ML suspension 30 mL, 30 mL, Oral, Daily PRN, Shane Gram, DO    furosemide (LASIX) injection 40 mg, 40 mg, Intravenous, BID, Shane Gram, DO, 40 mg at 08/14/20 0514    potassium chloride (KLOR-CON M) extended release tablet 20 mEq, 20 mEq, Oral, BID WC, Shankar A Russell, DO, 20 mEq at 08/14/20 6370      ALLERGIES:  Patient has no known allergies. SOCIAL HISTORY:    He is a former tobacco smoker, quit 20 years ago. He said he smoked 25+ years, less than half a pack per day. He denies former or current alcohol or illicit drug use. FAMILY HISTORY:   He states his mother passed away from a myocardial infarction around age 80.   His father also had a history of coronary artery disease. REVIEW OF SYSTEMS:     · Constitutional: +weight gain, 10lbs in one month. Denies fevers, chills, night sweats, and generalized fatigue. · HEENT: Denies headaches, nose bleeds, rhinorrhea, sore throat. Denies blurred vision. Denies dysphagia, odynophagia. · Musculoskeletal: Denies falls, pain to BLE with ambulation. Denies muscle weakness. · Neurological: Denies dizziness and lightheadedness, numbness and tingling. Denies focal neurological deficits. · Cardiovascular: +peripheral edema, +questionable orthopnea. Denies chest pain, palpitations, diaphoresis. Denies syncope. · Respiratory: +dyspnea, particularly on exertion but does occur at rest. Denies cough, hemoptysis. · Gastrointestinal: +decreased appetite. Denies abdominal pain, nausea/vomiting, diarrhea and constipation, black/bloody, and tarry stools. · Genitourinary: Denies dysuria and hematuria. · Hematologic: Denies excessive bruising or bleeding. · Endocrine: Denies excessive thirst. Denies intolerance to hot and cold. · Psychiatric: +anxiety. +depression. PHYSICAL EXAM:   /78   Pulse 81   Temp 97.5 °F (36.4 °C) (Oral)   Resp 20   Ht 5' 9\" (1.753 m)   Wt 144 lb (65.3 kg)   SpO2 98%   BMI 21.27 kg/m²   CONST:  Well developed, well nourished elderly WM who appears stated age. Awake, alert, cooperative, no apparent distress. Anxious, tearful. HEENT:   Head- Normocephalic, atraumatic. Eyes- Conjunctivae pink, anicteric. Throat- Oral mucosa pink and moist.  Neck-  No stridor, trachea midline, no apparent jugular venous distention. CHEST: Chest symmetrical and non-tender to palpation. No accessory muscle use or intercostal retractions. RESPIRATORY: Lung sounds - diminished bilaterally with rare rales, particularly at the bases. CARDIOVASCULAR:     Heart Inspection- shows no noted pulsations. Heart Palpation- no heaves or thrills.   Heart Ausculation- Regular rate and rhythm (ectopy at [facility]),1-7/8 systolic hours) at 8/14/2020 1244  Last data filed at 8/14/2020 0853  Gross per 24 hour   Intake 280 ml   Output 300 ml   Net -20 ml       Labs:   CBC:   Recent Labs     08/13/20 1908 08/14/20  0717   WBC 7.9 7.7   HGB 13.9 14.3   HCT 42.5 43.4    210     BMP:   Recent Labs     08/13/20 1908 08/14/20  0717    142   K 4.4 3.7   CO2 22 21*   BUN 25* 21   CREATININE 1.0 0.9   LABGLOM >60 >60   CALCIUM 9.2 9.1     Mag:   Recent Labs     08/14/20  0717   MG 1.7     Phos:   Recent Labs     08/14/20  0717   PHOS 2.8     HgA1c:   Lab Results   Component Value Date    LABA1C 5.5 12/10/2018     CARDIAC ENZYMES:  Recent Labs     08/13/20 1908 08/14/20  0717   TROPONINI 0.05* 0.05*     FASTING LIPID PANEL:  Lab Results   Component Value Date    CHOL 182 08/14/2020    HDL 55 08/14/2020    LDLCALC 114 08/14/2020    TRIG 64 08/14/2020     LIVER PROFILE:  Recent Labs     08/13/20 1908 08/14/20  0717   AST 51* 51*   ALT 74* 73*   LABALBU 3.6 3.8           Assessment and plan to follow as per Dr. Mike Whitman. Joe Yeung, [address] [place] [address] Cardiology    Electronically signed by Oren Duane, PA-C on 8/14/2020 at 12:44 PM   ______________________________________________________________________  Cardiology attending attestation:  I have independently interviewed and examined the patient. I personally reviewed pertinent laboratory values and diagnostic testing (including, if applicable, chest xray, electrocardiogram, telemetry, echocardiogram, stress testing, and coronary angiography). I have reviewed the above documentation completed by Joe Yeung PA-C including past medical, social, and family history and agree with the findings, assessment and plan except where noted. Plan formulated under my direct supervision. I participated in all aspects of the medical decision making.   Please see my additional contributions to the HPI, physical exam, and assessment / medical decision making:  _______________________________________________________________________    58-year-old male with history of carotid artery disease with a carotid stent, as well as hypertension presenting with progressive shortness of breath, lower extremity edema, paroxysmal nocturnal dyspnea and orthopnea. He states he has been very depressed and anxious since his partner  in May. He has lost a lot of weight, but then has noticed worsening lower extremity edema below the knees and shortness of breath. He was encouraged by his sister-in-law to come to the ER. He was found to have elevated proBNP of 11,200, chest x-ray showing finding consistent with CHF and a sizable right-sided pleural effusion. I read his echocardiogram today, he has severe LV systolic dysfunction with an EF of 15 to 20% with wall motion abnormalities, and possible LV apical thrombus. He has had some nonsustained VT and frequent PVCs on telemetry. He denies having had any chest pain. Physical Exam:  /78   Pulse 81   Temp 97.5 °F (36.4 °C) (Oral)   Resp 20   Ht 5' 9\" (1.753 m)   Wt 144 lb (65.3 kg)   SpO2 98%   BMI 21.27 kg/m²   General appearance: Awake, appears sleepy and somewhat depressed, no acute respiratory distress  Skin: Intact, no rash  ENMT: Moist mucous membranes  Neck: Supple, no carotid bruits. Elevated jugular venous pressure  Lungs: Diminished in bilateral bases  Cardiac: Regular rhythm with a normal rate, normal H4&I6, soft systolic murmur left sternal border  Abdomen: Soft, positive bowel sounds, nontender  Extremities: Moves all extremities x 4, no lower extremity edema  Neurologic: No focal motor deficits apparent, normal mood and affect    Telemetry: Predominantly sinus rhythm with runs of nonsustained VT, frequent PVCs  EKG: Sinus rhythm with frequent PVCs 108 bpm.  Possible right bundle branch block with left axis deviation. Poor quality EKG,  With a lot of artifact. Impression:   1.  Acute

## 2020-08-14 NOTE — ED NOTES
Nurse to nurse called to [facility], patient ready for transport     Judy Lugo, MARCIA  08/13/20 2038

## 2020-08-14 NOTE — ED NOTES
Called report to [facility], placed on hold for 10 minutes.       Willi Gupta RN  08/13/20 1207

## 2020-08-14 NOTE — H&P
tablet Take 1 tablet by mouth daily 20  Yes Marty Blacke, DO   allopurinol (ZYLOPRIM) 300 MG tablet TAKE ONE TABLET BY MOUTH EVERY DAY 20  Yes Marty Cane, DO   clopidogrel (PLAVIX) 75 MG tablet Take 75 mg by mouth every other day    Yes Historical Provider, MD   ibuprofen (ADVIL;MOTRIN) 800 MG tablet Take 1 tablet by mouth every 6 hours as needed for Pain 18   Maye Ch MD       Allergies:  Patient has no known allergies. Social History:   Social History     Socioeconomic History    Marital status: Single     Spouse name: Not on file    Number of children: Not on file    Years of education: Not on file    Highest education level: Not on file   Occupational History    Not on file   Social Needs    Financial resource strain: Not on file    Food insecurity     Worry: Not on file     Inability: Not on file    Transportation needs     Medical: Not on file     Non-medical: Not on file   Tobacco Use    Smoking status: Former Smoker     Packs/day: 0.25     Years: 50.00     Pack years: 12.50     Types: Cigarettes     Last attempt to quit: 2015     Years since quittin.3    Smokeless tobacco: Never Used   Substance and Sexual Activity    Alcohol use:  Yes     Alcohol/week: 1.0 standard drinks     Types: 1 Cans of beer per week     Comment: social    Drug use: No    Sexual activity: Not on file   Lifestyle    Physical activity     Days per week: Not on file     Minutes per session: Not on file    Stress: Not on file   Relationships    Social connections     Talks on phone: Not on file     Gets together: Not on file     Attends Holiness service: Not on file     Active member of club or organization: Not on file     Attends meetings of clubs or organizations: Not on file     Relationship status: Not on file    Intimate partner violence     Fear of current or ex partner: Not on file     Emotionally abused: Not on file     Physically abused: Not on file     Forced sexual activity: Not on file   Other Topics Concern    Not on file   Social History Narrative    Not on file       Family History:   Family History   Problem Relation Age of Onset    Heart Disease Mother     Stroke Mother     Coronary Art Dis Father        REVIEW OF SYSTEMS:    Gen: Patient denies any lightheadedness or dizziness. + Anxiety. No LOC or syncope. No fevers or chills. HEENT: No earache, sore throat or nasal congestion. Resp: Denies cough, hemoptysis or sputum production. Cardiac: Denies chest pain, +SOB, lower leg edema. No diaphoresis or palpitations. GI: No nausea, vomiting, diarrhea or constipation. No melena or hematochezia. : No urinary complaints, dysuria, hematuria or frequency. MSK: No extremity weakness, paralysis or paresthesias. PHYSICAL EXAM:    Vitals:  /78   Pulse 81   Temp 97.5 °F (36.4 °C) (Oral)   Resp 20   Ht 5' 9\" (1.753 m)   Wt 144 lb (65.3 kg)   SpO2 98%   BMI 21.27 kg/m²     General:  This is a 78 y.o. yo male who is alert and oriented in NAD  HEENT:  Head is normocephalic and atraumatic, PERRLA, EOMI, mucus membranes moist with no pharyngeal erythema or exudate. Neck:  Supple with no carotid bruits, JVD or thyromegaly.   No cervical adenopathy  CV:  Regular rate and rhythm, 7-9/7 systolic murmurs  Lungs: Coarse decreased breath sounds(right greater than left) with mild scattered crackles right base to auscultation with no wheezes, or rhonchi  Abdomen:  Soft, nontender, nondistended, bowel sounds present  Extremities:  +2 lower leg edema, peripheral pulses intact bilaterally  Neuro:  Cranial nerves II-XII grossly intact; motor and sensory function intact with no focal deficits  Skin:  No rashes, lesions or wounds    DATA:  CBC with Differential:    Lab Results   Component Value Date    WBC 7.7 08/14/2020    RBC 4.53 08/14/2020    HGB 14.3 08/14/2020    HCT 43.4 08/14/2020     08/14/2020    MCV 95.8 08/14/2020    MCH 31.6 08/14/2020 MCHC 32.9 08/14/2020    RDW 14.8 08/14/2020    SEGSPCT 60 09/11/2013    LYMPHOPCT 16.3 08/14/2020    MONOPCT 7.1 08/14/2020    BASOPCT 0.4 08/14/2020    MONOSABS 0.55 08/14/2020    LYMPHSABS 1.26 08/14/2020    EOSABS 0.09 08/14/2020    BASOSABS 0.03 08/14/2020     CMP:    Lab Results   Component Value Date     08/14/2020    K 3.7 08/14/2020    K 4.4 08/13/2020     08/14/2020    CO2 21 08/14/2020    BUN 21 08/14/2020    CREATININE 0.9 08/14/2020    GFRAA >60 08/14/2020    LABGLOM >60 08/14/2020    GLUCOSE 114 08/14/2020    GLUCOSE 101 09/21/2011    PROT 6.4 08/14/2020    LABALBU 3.8 08/14/2020    LABALBU 4.8 09/21/2011    CALCIUM 9.1 08/14/2020    BILITOT 0.9 08/14/2020    ALKPHOS 137 08/14/2020    AST 51 08/14/2020    ALT 73 08/14/2020     Magnesium:    Lab Results   Component Value Date    MG 1.7 08/14/2020     Phosphorus:    Lab Results   Component Value Date    PHOS 2.8 08/14/2020     PT/INR:    Lab Results   Component Value Date    PROTIME 12.3 08/22/2015    INR 1.0 08/22/2015     Troponin:    Lab Results   Component Value Date    TROPONINI 0.05 08/14/2020     U/A:    Lab Results   Component Value Date    COLORU Yellow 08/13/2020    PROTEINU TRACE 08/13/2020    PHUR 5.5 08/13/2020    WBCUA 1-3 08/13/2020    RBCUA >20 08/13/2020    BACTERIA RARE 08/13/2020    CLARITYU Clear 08/13/2020    SPECGRAV 1.025 08/13/2020    LEUKOCYTESUR Negative 08/13/2020    UROBILINOGEN 1.0 08/13/2020    BILIRUBINUR Negative 08/13/2020    BLOODU LARGE 08/13/2020    GLUCOSEU Negative 08/13/2020     ABG:  No results found for: PH, PCO2, PO2, HCO3, BE, THGB, TCO2, O2SAT  HgBA1c:    Lab Results   Component Value Date    LABA1C 5.5 12/10/2018     FLP:    Lab Results   Component Value Date    TRIG 64 08/14/2020    HDL 55 08/14/2020    LDLCALC 114 08/14/2020    LABVLDL 13 08/14/2020     TSH:    Lab Results   Component Value Date    TSH 2.190 12/06/2017     IRON:  No results found for: IRON  LIPASE:  No results found for: LIPASE    ASSESSMENT AND PLAN:      Patient Active Problem List    Diagnosis Date Noted    Congestive heart failure of unknown etiology ([facility] [address] 08/13/2020    Unilateral inguinal hernia 12/26/2017    Carotid artery stenosis 08/21/2015    Anxiety 02/26/2015    Gout 01/22/2015    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 Moderate right pleural effusion and small left pleural effusion  2. Probable acute CHF  3. Frequent PVCs, 10 beat nonsustained V. Tach  4. Hypertension  5. Mild elevation of troponin  6. Hyperlipidemia  7.   Anxiety/depression    Plan:  Home meds reviewed  Admit to [facility]  Repeat troponin  Lasix 40 mg IV push twice daily  Echocardiogram  Cardiac diet with fluid and salt restriction  Serum magnesium  Lopressor 25 mg twice daily  Magnesium sulfate 2 g IV piggyback x1  NEW/Sandor Khalil Final cardiology      Rachael Kam D.O.  8/14/2020  10:00 AM

## 2020-08-14 NOTE — CARE COORDINATION
SOCIAL WORK / DISCHARGE PLANNING:  COVID testing not done. Edvin met with pt at bedside to discuss transition to care. Claudia JIANG at bedside as well. Pt relies on her a great deal as he does not drive and she checks on him daily. Pt resides alone in 2 story home, access to bed and 1/2 bath on first floor but uses 2nd flr. He normally ambulates with a SPC, has access to a walker. Samanta Navarrete states he has a problem with his toe that is why he doesn't walk well. Pt is grieving the lose of this partner of 36 yrs who  of cancer in May. His PCP has ordered meds for depression and anxiety. No home O2. He is open to [facility] if ordered, discussed choices, no preference but plans on return home. [facility] will provide home going transport.                  Electronically signed by Lorin Lundborg, LSW on 2020 at 2:52 PM

## 2020-08-15 LAB
ANION GAP SERPL CALCULATED.3IONS-SCNC: 10 MMOL/L (ref 7–16)
APTT: 187.5 SEC (ref 24.5–35.1)
APTT: 64.5 SEC (ref 24.5–35.1)
APTT: 67.7 SEC (ref 24.5–35.1)
BUN BLDV-MCNC: 23 MG/DL (ref 8–23)
CALCIUM SERPL-MCNC: 8.3 MG/DL (ref 8.6–10.2)
CHLORIDE BLD-SCNC: 106 MMOL/L (ref 98–107)
CO2: 24 MMOL/L (ref 22–29)
CREAT SERPL-MCNC: 1.1 MG/DL (ref 0.7–1.2)
GFR AFRICAN AMERICAN: >60
GFR NON-AFRICAN AMERICAN: >60 ML/MIN/1.73
GLUCOSE BLD-MCNC: 104 MG/DL (ref 74–99)
HBA1C MFR BLD: 6 % (ref 4–5.6)
HCT VFR BLD CALC: 37.5 % (ref 37–54)
HEMOGLOBIN: 12.1 G/DL (ref 12.5–16.5)
POTASSIUM SERPL-SCNC: 3.7 MMOL/L (ref 3.5–5)
SODIUM BLD-SCNC: 140 MMOL/L (ref 132–146)

## 2020-08-15 PROCEDURE — 85014 HEMATOCRIT: CPT

## 2020-08-15 PROCEDURE — 80048 BASIC METABOLIC PNL TOTAL CA: CPT

## 2020-08-15 PROCEDURE — 6370000000 HC RX 637 (ALT 250 FOR IP): Performed by: PHYSICIAN ASSISTANT

## 2020-08-15 PROCEDURE — 99233 SBSQ HOSP IP/OBS HIGH 50: CPT | Performed by: INTERNAL MEDICINE

## 2020-08-15 PROCEDURE — 85018 HEMOGLOBIN: CPT

## 2020-08-15 PROCEDURE — 6360000002 HC RX W HCPCS: Performed by: PHYSICIAN ASSISTANT

## 2020-08-15 PROCEDURE — 6370000000 HC RX 637 (ALT 250 FOR IP): Performed by: INTERNAL MEDICINE

## 2020-08-15 PROCEDURE — 83036 HEMOGLOBIN GLYCOSYLATED A1C: CPT

## 2020-08-15 PROCEDURE — 36415 COLL VENOUS BLD VENIPUNCTURE: CPT

## 2020-08-15 PROCEDURE — 2060000000 HC ICU INTERMEDIATE R&B

## 2020-08-15 PROCEDURE — 6360000002 HC RX W HCPCS: Performed by: INTERNAL MEDICINE

## 2020-08-15 PROCEDURE — 85730 THROMBOPLASTIN TIME PARTIAL: CPT

## 2020-08-15 RX ORDER — METOPROLOL SUCCINATE 25 MG/1
25 TABLET, EXTENDED RELEASE ORAL DAILY
Status: DISCONTINUED | OUTPATIENT
Start: 2020-08-16 | End: 2020-08-18 | Stop reason: HOSPADM

## 2020-08-15 RX ORDER — METOPROLOL SUCCINATE 25 MG/1
25 TABLET, EXTENDED RELEASE ORAL DAILY
Status: DISCONTINUED | OUTPATIENT
Start: 2020-08-15 | End: 2020-08-15

## 2020-08-15 RX ADMIN — ASPIRIN 81 MG CHEWABLE TABLET 81 MG: 81 TABLET CHEWABLE at 10:06

## 2020-08-15 RX ADMIN — CARBAMIDE PEROXIDE 6.5% 5 DROP: 6.5 LIQUID AURICULAR (OTIC) at 10:05

## 2020-08-15 RX ADMIN — POTASSIUM CHLORIDE 20 MEQ: 20 TABLET, EXTENDED RELEASE ORAL at 10:07

## 2020-08-15 RX ADMIN — LORAZEPAM 0.5 MG: 0.5 TABLET ORAL at 19:54

## 2020-08-15 RX ADMIN — CITALOPRAM HYDROBROMIDE 10 MG: 20 TABLET ORAL at 10:07

## 2020-08-15 RX ADMIN — POTASSIUM CHLORIDE 20 MEQ: 20 TABLET, EXTENDED RELEASE ORAL at 19:55

## 2020-08-15 RX ADMIN — HEPARIN SODIUM 12 UNITS/KG/HR: 10000 INJECTION, SOLUTION INTRAVENOUS at 18:09

## 2020-08-15 RX ADMIN — FUROSEMIDE 40 MG: 10 INJECTION INTRAMUSCULAR; INTRAVENOUS at 12:55

## 2020-08-15 RX ADMIN — NITROGLYCERIN 0.5 INCH: 20 OINTMENT TOPICAL at 00:00

## 2020-08-15 RX ADMIN — FUROSEMIDE 40 MG: 10 INJECTION INTRAMUSCULAR; INTRAVENOUS at 21:14

## 2020-08-15 RX ADMIN — ATORVASTATIN CALCIUM 40 MG: 40 TABLET, FILM COATED ORAL at 10:07

## 2020-08-15 RX ADMIN — CHOLECALCIFEROL TAB 25 MCG (1000 UNIT) 1000 UNITS: 25 TAB at 10:08

## 2020-08-15 RX ADMIN — ALLOPURINOL 300 MG: 300 TABLET ORAL at 10:07

## 2020-08-15 RX ADMIN — CARBAMIDE PEROXIDE 6.5% 5 DROP: 6.5 LIQUID AURICULAR (OTIC) at 21:08

## 2020-08-15 RX ADMIN — LORAZEPAM 0.5 MG: 0.5 TABLET ORAL at 10:06

## 2020-08-15 ASSESSMENT — PAIN SCALES - GENERAL
PAINLEVEL_OUTOF10: 0
PAINLEVEL_OUTOF10: 0

## 2020-08-15 NOTE — PROGRESS NOTES
INPATIENT CARDIOLOGY FOLLOW-UP    Name: Charla Landa    Age: 78 y.o. Date of Admission: 8/13/2020  5:45 PM    Date of Service: 8/15/2020    Primary Cardiologist: Known to me from this admission    Chief Complaint: Follow-up for new onset CHF, cardiomyopathy    Interim History:  States breathing is better. Denies chest pain.     Some difficulty urinating into the urinal.    Per I's and O's only net -400 mL    Some NSVT on the monitor    Review of Systems:   Negative except as described above    Problem List:  Patient Active Problem List   Diagnosis    HTN (hypertension)    OA (osteoarthritis)    Hyperlipidemia    Bursal abscess    Gout synovitis    Chronic gout of right elbow    Gouty tophus L elbow    Gout    Anxiety    Carotid artery stenosis    Unilateral inguinal hernia    Congestive heart failure of unknown etiology ([facility] [address]       Current Medications:    Current Facility-Administered Medications:     metoprolol succinate (TOPROL XL) extended release tablet 25 mg, 25 mg, Oral, Daily, Maxi Ortega MD    carbamide peroxide [facility]) 6.5 % otic solution 5 drop, 5 drop, Right Ear, BID, Giselle Baker DO, 5 drop at 08/14/20 2033    heparin (porcine) injection 5,220 Units, 80 Units/kg, Intravenous, PRN, Chip Rivera PA-C    heparin (porcine) injection 2,610 Units, 40 Units/kg, Intravenous, PRN, Chip Rivera PA-C    heparin 25,000 units in dextrose 5% 250 mL infusion, 18 Units/kg/hr, Intravenous, Continuous, Parish Barajas PA-C Stopped at 08/15/20 6405    aspirin chewable tablet 81 mg, 81 mg, Oral, Daily, Esther Rivera PA-C, 81 mg at 08/14/20 1502    [START ON 8/16/2020] sacubitril-valsartan (ENTRESTO) 24-26 MG per tablet 1 tablet, 1 tablet, Oral, BID, Maxi Ortega MD    ibuprofen (ADVIL;MOTRIN) tablet 800 mg, 800 mg, Oral, Q6H PRN, Giselle Abrahamten, DO    allopurinol (ZYLOPRIM) tablet 300 mg, 300 mg, Oral, Daily, Giselle Baker DO, 300 mg at 08/14/20 0919   affect  Peripheral Pulses: Intact posterior tibial pulses bilaterally    Intake/Output:    Intake/Output Summary (Last 24 hours) at 8/15/2020 0656  Last data filed at 8/15/2020 0506  Gross per 24 hour   Intake 540 ml   Output 740 ml   Net -200 ml     I/O this shift:  In: -   Out: 450 [Urine:450]    Laboratory Tests:  Recent Labs     08/13/20 1908 08/14/20  0717 08/15/20  0530    142 140   K 4.4 3.7 3.7    104 106   CO2 22 21* 24   BUN 25* 21 23   CREATININE 1.0 0.9 1.1   GLUCOSE 113* 114* 104*   CALCIUM 9.2 9.1 8.3*     Lab Results   Component Value Date    MG 1.7 08/14/2020     Recent Labs     08/13/20 1908 08/14/20  0717   ALKPHOS 129 137*   ALT 74* 73*   AST 51* 51*   PROT 6.2* 6.4   BILITOT 0.9 0.9   LABALBU 3.6 3.8     Recent Labs     08/13/20 1908 08/14/20 0717 08/14/20  1441 08/15/20  0530   WBC 7.9 7.7 7.7  --    RBC 4.41 4.53 4.26  --    HGB 13.9 14.3 13.4 12.1*   HCT 42.5 43.4 40.9 37.5   MCV 96.4 95.8 96.0  --    MCH 31.5 31.6 31.5  --    MCHC 32.7 32.9 32.8  --    RDW 14.6 14.8 14.7  --     210 211  --    MPV 11.4 11.3 11.0  --      Lab Results   Component Value Date    CKTOTAL 105 08/22/2015    CKMB 1.7 08/22/2015    TROPONINI 0.05 (H) 08/14/2020    TROPONINI 0.05 (H) 08/13/2020    TROPONINI <0.01 08/22/2015     Lab Results   Component Value Date    INR 1.0 08/22/2015    INR 1.0 08/19/2015    PROTIME 12.3 08/22/2015    PROTIME 12.3 08/19/2015     Lab Results   Component Value Date    TSH 2.190 12/06/2017     Lab Results   Component Value Date    LABA1C 5.5 12/10/2018     No results found for: EAG  Lab Results   Component Value Date    CHOL 182 08/14/2020    CHOL 145 06/23/2020    CHOL 150 12/10/2019     Lab Results   Component Value Date    TRIG 64 08/14/2020    TRIG 44 06/23/2020    TRIG 45 12/10/2019     Lab Results   Component Value Date    HDL 55 08/14/2020    HDL 66 06/23/2020    HDL 72 12/10/2019     Lab Results   Component Value Date    LDLCALC 114 (H) 08/14/2020    [facility] apical thrombus. Moderately dilated right ventricle. Right ventricle global systolic function is low normal.   The left atrium is severely dilated. Mildly enlarged right atrium. Moderate-severe mitral regurgitation directed anteriorly and centrally. Mild aortic valve regurgitation. Moderate tricuspid regurgitation. Estimated PA pressure 48/22 mmHg. Moderate pulmonic regurgitation. Left pleural effusion. Stress test:    Pharmacologic stress test in 2015: IMPRESSION:  Pharmacologic stress myocardial perfusion imaging within normal limits. Stress induced ischemia is not demonstrated. Findings: Left ventricular myocardial contractibility was evaluated as part of SPECT cardiac imaging. Left ventricular chamber size is normal. Myocardial motion is unremarkable. Findings: SPECT gated images of the left ventricular myocardium were obtained for purposes of left ventricular ejection fraction determination.  Left ventricular ejection fraction is calculated at 45%.       Cardiac catheterization: na    ----------------------------------------------------------------------------------------------------------------------------------------------------------------  IMPRESSION:  1. Acute decompensated heart failure/HFrEF.  proBNP 11,200. Net -400 mL  2. New onset cardiomyopathy, EF 15 to 20% with wall motion abnormalities, suspicious for ischemic etiology. 3. Nonsustained ventricular tachycardia  4. Bilateral pleural effusions right greater than left  5. Valvular heart disease: Moderate to severe MR, moderate TR, mild aortic regurgitation, moderate pulmonic regurgitation  6. Probable left ventricular apical thrombus  7. PAD: History of left carotid stent  8. Asymptomatic hypotension  9. Comorbid disease: Hypertension, anxiety/depression, hyperlipidemia, gout    RECOMMENDATIONS:  Patient presenting with new onset heart failure and severe LV dysfunction.   I suspect likely etiology is ischemic with probable multivessel CAD. However atypical Takotsubo is a consideration given the death of his partner, though seems less likely. · Continue IV diuresis  · Strict I's and O's, low-sodium diet, daily weights   · Change metoprolol tartrate to succinate preparation  · Discontinue lisinopril with plans to transition to sacubitril/valsartan after 36-hour washout  · Discontinue transdermal nitroglycerin  · Recommend left heart catheterization once euvolemic, likely early next week, to evaluate for ischemic cause of cardiomyopathy  · Maintain K > 4.0, mag > 2.0  · Consider thoracentesis  · Continue intravenous heparin for treatment of LV thrombus, eventual transition to warfarin  · Discontinue clopidogrel in anticipation of possible surgical revascularization  · Start aspirin 81 mg daily  · Aggressive risk factor modification  · Further recommendations pending outcome of the above    Doria Schaumann, MD  [facility]) Cardiology    NOTE: This report was transcribed using voice recognition software. Every effort was made to ensure accuracy; however, inadvertent computerized transcription errors may be present.

## 2020-08-15 NOTE — PROGRESS NOTES
Dr. Masters Found notified of current BP 96/59 and .5.   Stated that he D/C nitro paste and to wait an hour recheck BP before giving Lasix

## 2020-08-15 NOTE — PROGRESS NOTES
Department of Internal Medicine        CHIEF COMPLAINT: Exertional dyspnea, anxiety, bilateral lower leg edema    Reason for Admission: Right pleural effusion, CHF    HISTORY OF PRESENT ILLNESS:      The patient is a 78 y.o. male who presents with exertional dyspnea and leg edema that is started about a month ago and is getting worse. Patient also complains of increased anxiety with his significant other passing away end of May 2020. Patient was put on Ativan and Celexa which is not helping a lot. Patient denies any chest pain, abdominal pain, nausea/vomiting, fever/chills, dizziness or syncope. In the ED was noted the patient was having frequent PVCs along with a mildly elevated troponin of 0.05.  proBNP was 11,000 and chest x-ray showed moderate right pleural effusion with small left pleural effusion. 8/15/2020  Patient seen and examined on [address]. Patient denies any chest pain, abdominal pain, nausea/vomiting. Patient does get somewhat short of breath with activity still. Cardiology notes reviewed. BUN/creatinine still 23/1.1 with hemoglobin 12.1. Temperature is 97.4 with heart rate 75 blood pressure currently 96/59. Urinary output is only about 350 cc a shift. O2 sats 97% on room air at rest.  Patient started on heparin drip yesterday with possible left ventricle apical thrombus along with severe cardiomyopathy with a EF about 20%. Patient also noted to have moderate TR, moderate-severe MR, moderate PI. Echocardiogram and test results reviewed with patient again today. Patient started on Entresto.     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,     Gout     History of cardiovascular stress test 07/2015    Lexiscan stress test    Hypertension     Kidney stone     stent/    Osteoarthritis      Past Surgical History:    Past Surgical History:   Procedure Laterality Date    LITHOTRIPSY      MANDIBLE SURGERY      OTHER SURGICAL HISTORY Left 2014    EXCISION OF OLECRANON BURSA    OTHER SURGICAL HISTORY  2018    Laparoscopic transabdominal right inguinal hernia repair with mesk and Excision of Lipoma of the Cord    TONSILLECTOMY         Medications Prior to Admission:    @  Prior to Admission medications    Medication Sig Start Date End Date Taking? Authorizing Provider   citalopram (CELEXA) 10 MG tablet Take 10 mg by mouth daily   Yes Historical Provider, MD   LORazepam (ATIVAN) 0.5 MG tablet Take 0.5 mg by mouth every 6 hours as needed for Anxiety. Yes Historical Provider, MD   atorvastatin (LIPITOR) 40 MG tablet Take 1 tablet by mouth daily 20  Yes Ronan Ram, DO   lisinopril (PRINIVIL;ZESTRIL) 20 MG tablet Take 1 tablet by mouth daily 20  Yes Marty Blacke, DO   allopurinol (ZYLOPRIM) 300 MG tablet TAKE ONE TABLET BY MOUTH EVERY DAY 20  Yes Marty Cane, DO   clopidogrel (PLAVIX) 75 MG tablet Take 75 mg by mouth every other day    Yes Historical Provider, MD   ibuprofen (ADVIL;MOTRIN) 800 MG tablet Take 1 tablet by mouth every 6 hours as needed for Pain 18   Maye Ch MD       Allergies:  Patient has no known allergies. Social History:   Social History     Socioeconomic History    Marital status: Single     Spouse name: Not on file    Number of children: Not on file    Years of education: Not on file    Highest education level: Not on file   Occupational History    Not on file   Social Needs    Financial resource strain: Not on file    Food insecurity     Worry: Not on file     Inability: Not on file    Transportation needs     Medical: Not on file     Non-medical: Not on file   Tobacco Use    Smoking status: Former Smoker     Packs/day: 0.25     Years: 50.00     Pack years: 12.50     Types: Cigarettes     Last attempt to quit: 2015     Years since quittin.3    Smokeless tobacco: Never Used   Substance and Sexual Activity    Alcohol use:  Yes Alcohol/week: 1.0 standard drinks     Types: 1 Cans of beer per week     Comment: social    Drug use: No    Sexual activity: Not on file   Lifestyle    Physical activity     Days per week: Not on file     Minutes per session: Not on file    Stress: Not on file   Relationships    Social connections     Talks on phone: Not on file     Gets together: Not on file     Attends Orthodox service: Not on file     Active member of club or organization: Not on file     Attends meetings of clubs or organizations: Not on file     Relationship status: Not on file    Intimate partner violence     Fear of current or ex partner: Not on file     Emotionally abused: Not on file     Physically abused: Not on file     Forced sexual activity: Not on file   Other Topics Concern    Not on file   Social History Narrative    Not on file       Family History:   Family History   Problem Relation Age of Onset    Heart Disease Mother     Stroke Mother     Coronary Art Dis Father        REVIEW OF SYSTEMS:    Gen: Patient denies any lightheadedness or dizziness. + Anxiety. No LOC or syncope. No fevers or chills. HEENT: No earache, sore throat or nasal congestion. Resp: Denies cough, hemoptysis or sputum production. Cardiac: Denies chest pain, +SOB, lower leg edema. No diaphoresis or palpitations. GI: No nausea, vomiting, diarrhea or constipation. No melena or hematochezia. : No urinary complaints, dysuria, hematuria or frequency. MSK: No extremity weakness, paralysis or paresthesias. PHYSICAL EXAM:    Vitals:  BP (!) 96/59   Pulse 75   Temp 97.4 °F (36.3 °C) (Oral)   Resp 18   Ht 5' 9\" (1.753 m)   Wt 144 lb 8 oz (65.5 kg)   SpO2 97%   BMI 21.34 kg/m²     General:  This is a 78 y.o. yo male who is alert and oriented in NAD  HEENT:  Head is normocephalic and atraumatic, PERRLA, EOMI, mucus membranes moist with no pharyngeal erythema or exudate. Neck:  Supple with no carotid bruits, JVD or thyromegaly. No cervical adenopathy  CV:  Regular rate and rhythm, 8-8/6 systolic murmurs  Lungs: Coarse decreased breath sounds(right greater than left) with mild scattered crackles right base to auscultation with no wheezes, or rhonchi  Abdomen:  Soft, nontender, nondistended, bowel sounds present  Extremities:  +2 lower leg edema, peripheral pulses intact bilaterally  Neuro:  Cranial nerves II-XII grossly intact; motor and sensory function intact with no focal deficits  Skin:  No rashes, lesions or wounds    DATA:  CBC with Differential:    Lab Results   Component Value Date    WBC 7.7 08/14/2020    RBC 4.26 08/14/2020    HGB 12.1 08/15/2020    HCT 37.5 08/15/2020     08/14/2020    MCV 96.0 08/14/2020    MCH 31.5 08/14/2020    MCHC 32.8 08/14/2020    RDW 14.7 08/14/2020    SEGSPCT 60 09/11/2013    LYMPHOPCT 16.3 08/14/2020    MONOPCT 7.1 08/14/2020    BASOPCT 0.4 08/14/2020    MONOSABS 0.55 08/14/2020    LYMPHSABS 1.26 08/14/2020    EOSABS 0.09 08/14/2020    BASOSABS 0.03 08/14/2020     CMP:    Lab Results   Component Value Date     08/15/2020    K 3.7 08/15/2020    K 4.4 08/13/2020     08/15/2020    CO2 24 08/15/2020    BUN 23 08/15/2020    CREATININE 1.1 08/15/2020    GFRAA >60 08/15/2020    LABGLOM >60 08/15/2020    GLUCOSE 104 08/15/2020    GLUCOSE 101 09/21/2011    PROT 6.4 08/14/2020    LABALBU 3.8 08/14/2020    LABALBU 4.8 09/21/2011    CALCIUM 8.3 08/15/2020    BILITOT 0.9 08/14/2020    ALKPHOS 137 08/14/2020    AST 51 08/14/2020    ALT 73 08/14/2020     Magnesium:    Lab Results   Component Value Date    MG 1.7 08/14/2020     Phosphorus:    Lab Results   Component Value Date    PHOS 2.8 08/14/2020     PT/INR:    Lab Results   Component Value Date    PROTIME 12.3 08/22/2015    INR 1.0 08/22/2015     Troponin:    Lab Results   Component Value Date    TROPONINI 0.05 08/14/2020     U/A:    Lab Results   Component Value Date    COLORU Yellow 08/13/2020    PROTEINU TRACE 08/13/2020    PHUR 5.5

## 2020-08-15 NOTE — PROGRESS NOTES
[address] [facility] [address]. Andrew House  BP 90/57. Pt has Nitro ointment q 6hrs on. And Per Protocol Heparin gtt .  I will follow orders of: stop 1hour and decrease 3 units and recheck PTT In 6 hours please call for any additional orders [phone]

## 2020-08-15 NOTE — PLAN OF CARE
Problem: Falls - Risk of:  Goal: Will remain free from falls  Description: Will remain free from falls  8/15/2020 0051 by Selin Ospina RN  Outcome: Met This Shift  8/14/2020 1909 by Justina Pfeiffer RN  Outcome: Met This Shift  Goal: Absence of physical injury  Description: Absence of physical injury  8/15/2020 0051 by Selin Ospina RN  Outcome: Met This Shift  8/14/2020 1909 by Justina Pfeiffer RN  Outcome: Met This Shift

## 2020-08-16 LAB
ANION GAP SERPL CALCULATED.3IONS-SCNC: 14 MMOL/L (ref 7–16)
APTT: 74.5 SEC (ref 24.5–35.1)
APTT: 77.1 SEC (ref 24.5–35.1)
APTT: 78.4 SEC (ref 24.5–35.1)
APTT: 80.3 SEC (ref 24.5–35.1)
BUN BLDV-MCNC: 23 MG/DL (ref 8–23)
CALCIUM SERPL-MCNC: 8.8 MG/DL (ref 8.6–10.2)
CHLORIDE BLD-SCNC: 101 MMOL/L (ref 98–107)
CO2: 26 MMOL/L (ref 22–29)
CREAT SERPL-MCNC: 1.1 MG/DL (ref 0.7–1.2)
GFR AFRICAN AMERICAN: >60
GFR NON-AFRICAN AMERICAN: >60 ML/MIN/1.73
GLUCOSE BLD-MCNC: 98 MG/DL (ref 74–99)
HCT VFR BLD CALC: 39.3 % (ref 37–54)
HEMOGLOBIN: 12.7 G/DL (ref 12.5–16.5)
MCH RBC QN AUTO: 31.5 PG (ref 26–35)
MCHC RBC AUTO-ENTMCNC: 32.3 % (ref 32–34.5)
MCV RBC AUTO: 97.5 FL (ref 80–99.9)
PDW BLD-RTO: 14.9 FL (ref 11.5–15)
PLATELET # BLD: 176 E9/L (ref 130–450)
PMV BLD AUTO: 11.6 FL (ref 7–12)
POTASSIUM SERPL-SCNC: 3.9 MMOL/L (ref 3.5–5)
RBC # BLD: 4.03 E12/L (ref 3.8–5.8)
SODIUM BLD-SCNC: 141 MMOL/L (ref 132–146)
WBC # BLD: 7.3 E9/L (ref 4.5–11.5)

## 2020-08-16 PROCEDURE — 6370000000 HC RX 637 (ALT 250 FOR IP): Performed by: INTERNAL MEDICINE

## 2020-08-16 PROCEDURE — 36415 COLL VENOUS BLD VENIPUNCTURE: CPT

## 2020-08-16 PROCEDURE — 99233 SBSQ HOSP IP/OBS HIGH 50: CPT | Performed by: INTERNAL MEDICINE

## 2020-08-16 PROCEDURE — 85730 THROMBOPLASTIN TIME PARTIAL: CPT

## 2020-08-16 PROCEDURE — 6360000002 HC RX W HCPCS: Performed by: PHYSICIAN ASSISTANT

## 2020-08-16 PROCEDURE — 6370000000 HC RX 637 (ALT 250 FOR IP): Performed by: PHYSICIAN ASSISTANT

## 2020-08-16 PROCEDURE — 2060000000 HC ICU INTERMEDIATE R&B

## 2020-08-16 PROCEDURE — 85027 COMPLETE CBC AUTOMATED: CPT

## 2020-08-16 PROCEDURE — 80048 BASIC METABOLIC PNL TOTAL CA: CPT

## 2020-08-16 PROCEDURE — 6360000002 HC RX W HCPCS: Performed by: INTERNAL MEDICINE

## 2020-08-16 RX ORDER — FUROSEMIDE 10 MG/ML
20 INJECTION INTRAMUSCULAR; INTRAVENOUS 2 TIMES DAILY
Status: DISCONTINUED | OUTPATIENT
Start: 2020-08-16 | End: 2020-08-17

## 2020-08-16 RX ADMIN — LORAZEPAM 0.5 MG: 0.5 TABLET ORAL at 10:19

## 2020-08-16 RX ADMIN — FUROSEMIDE 20 MG: 10 INJECTION, SOLUTION INTRAMUSCULAR; INTRAVENOUS at 17:20

## 2020-08-16 RX ADMIN — POTASSIUM CHLORIDE 20 MEQ: 20 TABLET, EXTENDED RELEASE ORAL at 17:20

## 2020-08-16 RX ADMIN — CHOLECALCIFEROL TAB 25 MCG (1000 UNIT) 1000 UNITS: 25 TAB at 10:20

## 2020-08-16 RX ADMIN — ATORVASTATIN CALCIUM 40 MG: 40 TABLET, FILM COATED ORAL at 10:19

## 2020-08-16 RX ADMIN — POTASSIUM CHLORIDE 20 MEQ: 20 TABLET, EXTENDED RELEASE ORAL at 10:19

## 2020-08-16 RX ADMIN — HEPARIN SODIUM 12 UNITS/KG/HR: 10000 INJECTION, SOLUTION INTRAVENOUS at 21:56

## 2020-08-16 RX ADMIN — METOPROLOL SUCCINATE 25 MG: 25 TABLET, EXTENDED RELEASE ORAL at 13:00

## 2020-08-16 RX ADMIN — ASPIRIN 81 MG CHEWABLE TABLET 81 MG: 81 TABLET CHEWABLE at 10:19

## 2020-08-16 RX ADMIN — LORAZEPAM 0.5 MG: 0.5 TABLET ORAL at 21:54

## 2020-08-16 RX ADMIN — CITALOPRAM HYDROBROMIDE 10 MG: 20 TABLET ORAL at 10:20

## 2020-08-16 RX ADMIN — SACUBITRIL AND VALSARTAN 1 TABLET: 24; 26 TABLET, FILM COATED ORAL at 10:19

## 2020-08-16 RX ADMIN — ALLOPURINOL 300 MG: 300 TABLET ORAL at 10:19

## 2020-08-16 ASSESSMENT — PAIN SCALES - GENERAL
PAINLEVEL_OUTOF10: 0

## 2020-08-16 NOTE — PROGRESS NOTES
shift. 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,     Gout     History of cardiovascular stress test 07/2015    Lexiscan stress test    Hypertension     Kidney stone     stent/    Osteoarthritis      Past Surgical History:    Past Surgical History:   Procedure Laterality Date    LITHOTRIPSY      MANDIBLE SURGERY      OTHER SURGICAL HISTORY Left 5/8/2014    EXCISION OF OLECRANON BURSA    OTHER SURGICAL HISTORY  02/08/2018    Laparoscopic transabdominal right inguinal hernia repair with mesk and Excision of Lipoma of the Cord    TONSILLECTOMY         Medications Prior to Admission:    @  Prior to Admission medications    Medication Sig Start Date End Date Taking? Authorizing Provider   citalopram (CELEXA) 10 MG tablet Take 10 mg by mouth daily   Yes Historical Provider, MD   LORazepam (ATIVAN) 0.5 MG tablet Take 0.5 mg by mouth every 6 hours as needed for Anxiety. Yes Historical Provider, MD   atorvastatin (LIPITOR) 40 MG tablet Take 1 tablet by mouth daily 6/4/20  Yes Casimiro Farmer,    lisinopril (PRINIVIL;ZESTRIL) 20 MG tablet Take 1 tablet by mouth daily 1/14/20  Yes Silvia Mckeon DO   allopurinol (ZYLOPRIM) 300 MG tablet TAKE ONE TABLET BY MOUTH EVERY DAY 1/14/20  Yes Silvia Mckeon DO   clopidogrel (PLAVIX) 75 MG tablet Take 75 mg by mouth every other day    Yes Historical Provider, MD   ibuprofen (ADVIL;MOTRIN) 800 MG tablet Take 1 tablet by mouth every 6 hours as needed for Pain 2/8/18   Annamarie Paredes MD       Allergies:  Patient has no known allergies.     Social History:   Social History     Socioeconomic History    Marital status: Single     Spouse name: Not on file    Number of children: Not on file    Years of education: Not on file    Highest education level: Not on file   Occupational History    Not on file   Social Needs    Financial resource strain: Not on file    Food insecurity EXAM:    Vitals:  BP 88/60   Pulse 76   Temp 97.4 °F (36.3 °C) (Oral)   Resp 16   Ht 5' 9\" (1.753 m)   Wt 152 lb 2 oz (69 kg)   SpO2 92%   BMI 22.46 kg/m²     General:  This is a 78 y.o. yo male who is alert and oriented in NAD  HEENT:  Head is normocephalic and atraumatic, PERRLA, EOMI, mucus membranes moist with no pharyngeal erythema or exudate. Neck:  Supple with no carotid bruits, JVD or thyromegaly.   No cervical adenopathy  CV:  Regular rate and rhythm, 0-8/3 systolic murmurs  Lungs: Coarse decreased breath sounds(right greater than left) with mild scattered crackles right base to auscultation with no wheezes, or rhonchi  Abdomen:  Soft, nontender, nondistended, bowel sounds present  Extremities:  +2 lower leg edema, peripheral pulses intact bilaterally  Neuro:  Cranial nerves II-XII grossly intact; motor and sensory function intact with no focal deficits  Skin:  No rashes, lesions or wounds    DATA:  CBC with Differential:    Lab Results   Component Value Date    WBC 7.3 08/16/2020    RBC 4.03 08/16/2020    HGB 12.7 08/16/2020    HCT 39.3 08/16/2020     08/16/2020    MCV 97.5 08/16/2020    MCH 31.5 08/16/2020    MCHC 32.3 08/16/2020    RDW 14.9 08/16/2020    SEGSPCT 60 09/11/2013    LYMPHOPCT 16.3 08/14/2020    MONOPCT 7.1 08/14/2020    BASOPCT 0.4 08/14/2020    MONOSABS 0.55 08/14/2020    LYMPHSABS 1.26 08/14/2020    EOSABS 0.09 08/14/2020    BASOSABS 0.03 08/14/2020     CMP:    Lab Results   Component Value Date     08/16/2020    K 3.9 08/16/2020    K 4.4 08/13/2020     08/16/2020    CO2 26 08/16/2020    BUN 23 08/16/2020    CREATININE 1.1 08/16/2020    GFRAA >60 08/16/2020    LABGLOM >60 08/16/2020    GLUCOSE 98 08/16/2020    GLUCOSE 101 09/21/2011    PROT 6.4 08/14/2020    LABALBU 3.8 08/14/2020    LABALBU 4.8 09/21/2011    CALCIUM 8.8 08/16/2020    BILITOT 0.9 08/14/2020    ALKPHOS 137 08/14/2020    AST 51 08/14/2020    ALT 73 08/14/2020     Magnesium:    Lab Results Component Value Date    MG 1.7 08/14/2020     Phosphorus:    Lab Results   Component Value Date    PHOS 2.8 08/14/2020     PT/INR:    Lab Results   Component Value Date    PROTIME 12.3 08/22/2015    INR 1.0 08/22/2015     Troponin:    Lab Results   Component Value Date    TROPONINI 0.05 08/14/2020     U/A:    Lab Results   Component Value Date    COLORU Yellow 08/13/2020    PROTEINU TRACE 08/13/2020    PHUR 5.5 08/13/2020    WBCUA 1-3 08/13/2020    RBCUA >20 08/13/2020    BACTERIA RARE 08/13/2020    CLARITYU Clear 08/13/2020    SPECGRAV 1.025 08/13/2020    LEUKOCYTESUR Negative 08/13/2020    UROBILINOGEN 1.0 08/13/2020    BILIRUBINUR Negative 08/13/2020    BLOODU LARGE 08/13/2020    GLUCOSEU Negative 08/13/2020     ABG:  No results found for: PH, PCO2, PO2, HCO3, BE, THGB, TCO2, O2SAT  HgBA1c:    Lab Results   Component Value Date    LABA1C 6.0 08/15/2020     FLP:    Lab Results   Component Value Date    TRIG 64 08/14/2020    HDL 55 08/14/2020    LDLCALC 114 08/14/2020    LABVLDL 13 08/14/2020     TSH:    Lab Results   Component Value Date    TSH 2.190 12/06/2017     IRON:  No results found for: IRON  LIPASE:  No results found for: LIPASE    ASSESSMENT AND PLAN:      Patient Active Problem List    Diagnosis Date Noted    Congestive heart failure of unknown etiology (RUSTca 75.) 08/13/2020    Unilateral inguinal hernia 12/26/2017    Carotid artery stenosis 08/21/2015    Anxiety 02/26/2015    Gout 01/22/2015    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 Moderate right pleural effusion and small left pleural effusion  2. Acute systolic CHF-EF 00-11%  3. Frequent PVCs, 10 beat nonsustained V. Tach  4. Hypertension  5. Mild elevation of troponin  6. Hyperlipidemia  7. Anxiety/depression  8.   Probable left ventricular

## 2020-08-16 NOTE — PROGRESS NOTES
tablet 40 mg, 40 mg, Oral, Daily, Marianne Hait, DO, 40 mg at 08/15/20 1007    citalopram (CELEXA) tablet 10 mg, 10 mg, Oral, Daily, Marianne Hait, DO, 10 mg at 08/15/20 1007    LORazepam (ATIVAN) tablet 0.5 mg, 0.5 mg, Oral, Q6H PRN, Marianne Hait, DO, 0.5 mg at 08/15/20 1954    vitamin D (CHOLECALCIFEROL) tablet 1,000 Units, 1,000 Units, Oral, Daily, Marianne Hait, DO, 1,000 Units at 08/15/20 1008    acetaminophen (TYLENOL) tablet 500 mg, 500 mg, Oral, Q6H PRN, Marianne Hait, DO    trimethobenzamide (TIGAN) injection 200 mg, 200 mg, Intramuscular, Q6H PRN, Marianne Hait, DO    magnesium hydroxide (MILK OF MAGNESIA) 400 MG/5ML suspension 30 mL, 30 mL, Oral, Daily PRN, Marianne Hait, DO    potassium chloride (KLOR-CON M) extended release tablet 20 mEq, 20 mEq, Oral, BID WC, Shankar A Russell, DO, 20 mEq at 08/15/20 1955    Physical Exam:  BP (!) 87/50   Pulse 75   Temp 98.2 °F (36.8 °C)   Resp 16   Ht 5' 9\" (1.753 m)   Wt 152 lb 2 oz (69 kg)   SpO2 97%   BMI 22.46 kg/m²   Wt Readings from Last 3 Encounters:   08/16/20 152 lb 2 oz (69 kg)   01/14/20 160 lb 12.8 oz (72.9 kg)   07/09/19 160 lb 9.6 oz (72.8 kg)     Appearance: Awake, drowsy, no acute respiratory distress  Skin: Intact, no rash  Head: Normocephalic, atraumatic  Eyes: EOMI, no conjunctival erythema  ENMT: No pharyngeal erythema, MMM, no rhinorrhea  Neck: Supple, + elevated JVP, no carotid bruits  Lungs: Few basilar rales, diminished breath sounds at the right base  Cardiac: PMI nondisplaced, Regular rhythm with a normal rate, S1 & S2 normal, soft systolic murmur left sternal border  Abdomen: Soft, nontender, +bowel sounds  Extremities: Moves all extremities x 4, 2+ pitting bilateral lower extremity edema  Neurologic: No focal motor deficits apparent, normal mood and affect  Peripheral Pulses: Intact posterior tibial pulses bilaterally    Intake/Output:    Intake/Output Summary (Last 24 hours) at 8/16/2020 0840  Last data filed at 8/16/2020 7354  Gross per 24 hour   Intake 595 ml   Output 2300 ml   Net -1705 ml     No intake/output data recorded.     Laboratory Tests:  Recent Labs     08/13/20 1908 08/14/20 0717 08/15/20  0530    142 140   K 4.4 3.7 3.7    104 106   CO2 22 21* 24   BUN 25* 21 23   CREATININE 1.0 0.9 1.1   GLUCOSE 113* 114* 104*   CALCIUM 9.2 9.1 8.3*     Lab Results   Component Value Date    MG 1.7 08/14/2020     Recent Labs     08/13/20 1908 08/14/20  0717   ALKPHOS 129 137*   ALT 74* 73*   AST 51* 51*   PROT 6.2* 6.4   BILITOT 0.9 0.9   LABALBU 3.6 3.8     Recent Labs     08/14/20  0717 08/14/20  1441 08/15/20  0530 08/16/20  0726   WBC 7.7 7.7  --  7.3   RBC 4.53 4.26  --  4.03   HGB 14.3 13.4 12.1* 12.7   HCT 43.4 40.9 37.5 39.3   MCV 95.8 96.0  --  97.5   MCH 31.6 31.5  --  31.5   MCHC 32.9 32.8  --  32.3   RDW 14.8 14.7  --  14.9    211  --  176   MPV 11.3 11.0  --  11.6     Lab Results   Component Value Date    CKTOTAL 105 08/22/2015    CKMB 1.7 08/22/2015    TROPONINI 0.05 (H) 08/14/2020    TROPONINI 0.05 (H) 08/13/2020    TROPONINI <0.01 08/22/2015     Lab Results   Component Value Date    INR 1.0 08/22/2015    INR 1.0 08/19/2015    PROTIME 12.3 08/22/2015    PROTIME 12.3 08/19/2015     Lab Results   Component Value Date    TSH 2.190 12/06/2017     Lab Results   Component Value Date    LABA1C 6.0 (H) 08/15/2020     No results found for: EAG  Lab Results   Component Value Date    CHOL 182 08/14/2020    CHOL 145 06/23/2020    CHOL 150 12/10/2019     Lab Results   Component Value Date    TRIG 64 08/14/2020    TRIG 44 06/23/2020    TRIG 45 12/10/2019     Lab Results   Component Value Date    HDL 55 08/14/2020    HDL 66 06/23/2020    HDL 72 12/10/2019     Lab Results   Component Value Date    LDLCALC 114 (H) 08/14/2020    LDLCALC 70 06/23/2020    LDLCALC 69 12/10/2019     Lab Results   Component Value Date    LABVLDL 13 08/14/2020    LABVLDL 9 06/23/2020    LABVLDL 9 12/10/2019     No results found for: [facility]  Recent Labs     08/13/20  1908   PROBNP 73,674*       Cardiac Tests:    EKG: Sinus rhythm with frequent PVCs 108 bpm.  Possible right bundle branch block with left axis deviation. Poor quality EKG,  With a lot of artifact. Telemetry: Sinus rhythm, episodes of nonsustained VT/PVCs/couplets    Chest X-ray:   8/13/2020  FINDINGS:   There is a moderate right pleural effusion, obscuring the right lung base   with some basilar atelectasis.  Strandy density is seen in the retrocardiac   portion of the left lower lobe that could be related to atelectasis or   pneumonia.  The left lung is otherwise clear. Constancia Border is a small left   effusion.  The cardiac silhouette is poorly visualized due to pleural   effusion.        Impression:         1. Moderate right pleural effusion. 2. Small left pleural effusion.  Retrocardiac left lower lobe opacity that   could represent atelectasis or pneumonia. Bilateral lower extremity Dopplers 8/13/2020  FINDINGS:   The visualized veins of the bilateral lower extremities are patent and free   of echogenic thrombus. The veins are normally compressible and have normal   phasic flow.        Impression:         No evidence of DVT in either lower extremity. Echocardiogram:   Transthoracic echo 8/13/2020   Summary   Micro-bubble contrast injected to enhance left ventricular visualization. Left ventricle is mildly dilated, LVEDD 6.0 cm. LV systolic function is severely reduced. Ejection fraction is visually estimated at 15-20%. Grade II diastolic dysfunction. There is severe global hypokinesis with wall motion abnormalities as   described above. Mild left ventricular concentric hypertrophy noted. There is a filling defect noted in the LV apex, seen only on subcostal   windows, suggestive of LV apical thrombus. Moderately dilated right ventricle. Right ventricle global systolic function is low normal.   The left atrium is severely dilated. I's and O's, low-sodium diet, daily weights   · Guideline medical therapy limited by hypotension  · Continue metoprolol succinate 25 mg daily if tolerated  · I will plan to start sacubitril/valsartan, likely will not tolerate right now  · Recommend left heart catheterization once euvolemic, likely early next week, to evaluate for ischemic cause of cardiomyopathy  · Maintain K > 4.0, mag > 2.0  · Consider thoracentesis  · Continue intravenous heparin for treatment of LV thrombus, eventual transition to warfarin  · Discontinue clopidogrel in anticipation of possible surgical revascularization  · Continue aspirin 81 mg daily  · Aggressive risk factor modification  · Further recommendations pending outcome of the above    Sergio Hope MD  [facility]) Cardiology    NOTE: This report was transcribed using voice recognition software. Every effort was made to ensure accuracy; however, inadvertent computerized transcription errors may be present.

## 2020-08-16 NOTE — PLAN OF CARE
Problem: Falls - Risk of:  Goal: Will remain free from falls  Description: Will remain free from falls  8/16/2020 1554 by Poornima Freedman RN  Outcome: Met This Shift     Problem: Falls - Risk of:  Goal: Absence of physical injury  Description: Absence of physical injury  8/16/2020 1554 by Poornima Freedman RN  Outcome: Met This Shift     Problem: Activity:  Goal: Capacity to carry out activities will improve  Description: Capacity to carry out activities will improve  8/16/2020 1554 by Poornima Freedman RN  Outcome: Met This Shift     Problem:  Activity:  Goal: Will verbalize the importance of balancing activity with adequate rest periods  Description: Will verbalize the importance of balancing activity with adequate rest periods  8/16/2020 1554 by Poornima Freedman RN  Outcome: Met This Shift     Problem: Cardiac:  Goal: Hemodynamic stability will improve  Description: Hemodynamic stability will improve  8/16/2020 1554 by Poornima Freedman RN  Outcome: Met This Shift     Problem: Cardiac:  Goal: Ability to maintain an adequate cardiac output will improve  Description: Ability to maintain an adequate cardiac output will improve  Outcome: Met This Shift     Problem: Coping:  Goal: Verbalizations of decreased anxiety will decrease  Description: Verbalizations of decreased anxiety will decrease  8/16/2020 1554 by Poornima Freedman RN  Outcome: Met This Shift     Problem: Fluid Volume:  Goal: Risk for excess fluid volume will decrease  Description: Risk for excess fluid volume will decrease  8/16/2020 1554 by Poornima Freedman RN  Outcome: Met This Shift     Problem: Fluid Volume:  Goal: Maintenance of adequate hydration will improve  Description: Maintenance of adequate hydration will improve  Outcome: Met This Shift     Problem: Fluid Volume:  Goal: Will show no signs and symptoms of electrolyte imbalance  Description: Will show no signs and symptoms of electrolyte imbalance  Outcome: Met This Shift     Problem: Health Behavior:  Goal: Ability to manage health-related needs will improve  Description: Ability to manage health-related needs will improve  8/16/2020 1554 by Diann Shone, RN  Outcome: Met This Shift     Problem: Health Behavior:  Goal: Ability to seek appropriate health care will improve  Description: Ability to seek appropriate health care will improve  Outcome: Met This Shift     Problem: Nutritional:  Goal: Maintenance of adequate nutrition will improve  Description: Maintenance of adequate nutrition will improve  Outcome: Met This Shift     Problem: Physical Regulation:  Goal: Complications related to the disease process, condition or treatment will be avoided or minimized  Description: Complications related to the disease process, condition or treatment will be avoided or minimized  Outcome: Met This Shift     Problem: Respiratory:  Goal: Ability to maintain adequate ventilation will improve  Description: Ability to maintain adequate ventilation will improve  Outcome: Met This Shift     Problem: Respiratory:  Goal: Respiratory status will improve  Description: Respiratory status will improve  Outcome: Met This Shift

## 2020-08-16 NOTE — PROGRESS NOTES
Dr Mary Kuo notified of pt still having low BP. Was not given metoprolol yesterday because of BP. Lasix was given last night after getting a better reading on LLE. This morning BP continues to be low.   Dr. Mary Kuo verbally state to push back time on  Lasix and recheck BP

## 2020-08-17 LAB
ANION GAP SERPL CALCULATED.3IONS-SCNC: 12 MMOL/L (ref 7–16)
APTT: 87.8 SEC (ref 24.5–35.1)
BUN BLDV-MCNC: 21 MG/DL (ref 8–23)
CALCIUM SERPL-MCNC: 8.4 MG/DL (ref 8.6–10.2)
CHLORIDE BLD-SCNC: 101 MMOL/L (ref 98–107)
CO2: 25 MMOL/L (ref 22–29)
CREAT SERPL-MCNC: 1 MG/DL (ref 0.7–1.2)
GFR AFRICAN AMERICAN: >60
GFR NON-AFRICAN AMERICAN: >60 ML/MIN/1.73
GLUCOSE BLD-MCNC: 93 MG/DL (ref 74–99)
POTASSIUM SERPL-SCNC: 3.8 MMOL/L (ref 3.5–5)
SODIUM BLD-SCNC: 138 MMOL/L (ref 132–146)

## 2020-08-17 PROCEDURE — 6370000000 HC RX 637 (ALT 250 FOR IP): Performed by: INTERNAL MEDICINE

## 2020-08-17 PROCEDURE — 6360000002 HC RX W HCPCS: Performed by: STUDENT IN AN ORGANIZED HEALTH CARE EDUCATION/TRAINING PROGRAM

## 2020-08-17 PROCEDURE — 80048 BASIC METABOLIC PNL TOTAL CA: CPT

## 2020-08-17 PROCEDURE — 6370000000 HC RX 637 (ALT 250 FOR IP): Performed by: PHYSICIAN ASSISTANT

## 2020-08-17 PROCEDURE — 36415 COLL VENOUS BLD VENIPUNCTURE: CPT

## 2020-08-17 PROCEDURE — 85730 THROMBOPLASTIN TIME PARTIAL: CPT

## 2020-08-17 PROCEDURE — 6360000002 HC RX W HCPCS: Performed by: INTERNAL MEDICINE

## 2020-08-17 PROCEDURE — 2060000000 HC ICU INTERMEDIATE R&B

## 2020-08-17 PROCEDURE — 99232 SBSQ HOSP IP/OBS MODERATE 35: CPT | Performed by: STUDENT IN AN ORGANIZED HEALTH CARE EDUCATION/TRAINING PROGRAM

## 2020-08-17 RX ORDER — POTASSIUM CHLORIDE 20 MEQ/1
20 TABLET, EXTENDED RELEASE ORAL
Status: DISCONTINUED | OUTPATIENT
Start: 2020-08-17 | End: 2020-08-18 | Stop reason: HOSPADM

## 2020-08-17 RX ORDER — FUROSEMIDE 10 MG/ML
60 INJECTION INTRAMUSCULAR; INTRAVENOUS 3 TIMES DAILY
Status: DISCONTINUED | OUTPATIENT
Start: 2020-08-17 | End: 2020-08-18 | Stop reason: HOSPADM

## 2020-08-17 RX ORDER — FUROSEMIDE 10 MG/ML
40 INJECTION INTRAMUSCULAR; INTRAVENOUS 3 TIMES DAILY
Status: DISCONTINUED | OUTPATIENT
Start: 2020-08-17 | End: 2020-08-17

## 2020-08-17 RX ADMIN — FUROSEMIDE 20 MG: 10 INJECTION, SOLUTION INTRAMUSCULAR; INTRAVENOUS at 05:45

## 2020-08-17 RX ADMIN — ATORVASTATIN CALCIUM 40 MG: 40 TABLET, FILM COATED ORAL at 08:42

## 2020-08-17 RX ADMIN — CHOLECALCIFEROL TAB 25 MCG (1000 UNIT) 1000 UNITS: 25 TAB at 08:44

## 2020-08-17 RX ADMIN — CITALOPRAM HYDROBROMIDE 10 MG: 20 TABLET ORAL at 08:43

## 2020-08-17 RX ADMIN — FUROSEMIDE 40 MG: 10 INJECTION, SOLUTION INTRAMUSCULAR; INTRAVENOUS at 08:45

## 2020-08-17 RX ADMIN — ASPIRIN 81 MG CHEWABLE TABLET 81 MG: 81 TABLET CHEWABLE at 08:42

## 2020-08-17 RX ADMIN — ALLOPURINOL 300 MG: 300 TABLET ORAL at 08:42

## 2020-08-17 RX ADMIN — POTASSIUM CHLORIDE 20 MEQ: 20 TABLET, EXTENDED RELEASE ORAL at 08:43

## 2020-08-17 RX ADMIN — LORAZEPAM 0.5 MG: 0.5 TABLET ORAL at 21:20

## 2020-08-17 RX ADMIN — POTASSIUM CHLORIDE 20 MEQ: 1500 TABLET, EXTENDED RELEASE ORAL at 12:53

## 2020-08-17 RX ADMIN — METOPROLOL SUCCINATE 25 MG: 25 TABLET, EXTENDED RELEASE ORAL at 08:42

## 2020-08-17 RX ADMIN — FUROSEMIDE 60 MG: 10 INJECTION, SOLUTION INTRAMUSCULAR; INTRAVENOUS at 21:08

## 2020-08-17 RX ADMIN — POTASSIUM CHLORIDE 20 MEQ: 1500 TABLET, EXTENDED RELEASE ORAL at 17:09

## 2020-08-17 ASSESSMENT — PAIN SCALES - GENERAL
PAINLEVEL_OUTOF10: 0

## 2020-08-17 NOTE — PROGRESS NOTES
Department of Internal Medicine        CHIEF COMPLAINT: Exertional dyspnea, anxiety, bilateral lower leg edema    Reason for Admission: Right pleural effusion, CHF    HISTORY OF PRESENT ILLNESS:      The patient is a 78 y.o. male who presents with exertional dyspnea and leg edema that is started about a month ago and is getting worse. Patient also complains of increased anxiety with his significant other passing away end of May 2020. Patient was put on Ativan and Celexa which is not helping a lot. Patient denies any chest pain, abdominal pain, nausea/vomiting, fever/chills, dizziness or syncope. In the ED was noted the patient was having frequent PVCs along with a mildly elevated troponin of 0.05.  proBNP was 11,000 and chest x-ray showed moderate right pleural effusion with small left pleural effusion. 8/15/2020  Patient seen and examined on [facility]. Patient denies any chest pain, abdominal pain, nausea/vomiting. Patient does get somewhat short of breath with activity still. Cardiology notes reviewed. BUN/creatinine still 23/1.1 with hemoglobin 12.1. Temperature is 97.4 with heart rate 75 blood pressure currently 96/59. Urinary output is only about 350 cc a shift. O2 sats 97% on room air at rest.  Patient started on heparin drip yesterday with possible left ventricle apical thrombus along with severe cardiomyopathy with a EF about 20%. Patient also noted to have moderate TR, moderate-severe MR, moderate PI. Echocardiogram and test results reviewed with patient again today. Patient started on Entresto. 8/16/2020  Patient seen and examined on [facility]. Patient denies any chest pain, abdominal pain, nausea/vomiting. Patient breathing is getting better. Case discussed with patient family member at the bedside. Cardiology note reviewed. BUN/creatinine was 23/1.1 with hemoglobin 12.7 WBC 7.3. Temperature 97.4 with blood pressure 88/60. O2 sats 92% on room air.   Urinary output ranges 400- 1300 cc a shift.    8/17/2020  Patient seen and examined on [facility]. Patient is still very anxious. Patient denies any chest pain, abdominal pain, nausea or vomiting. Patient has some mild dyspnea with exertion. Cardiology note reviewed. Temperature is 98.7 with heart rate of 70. Blood pressure currently 100/69. O2 sats 98% on room air. Urine output ranges 200-650 cc a shift. BUN/creatinine is 21/1.0. Heparin drip will be stopped when the patient is sent for heart catheterization tomorrow at [facility].        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,     Gout     History of cardiovascular stress test 07/2015    Lexiscan stress test    Hypertension     Kidney stone     stent/    Osteoarthritis      Past Surgical History:    Past Surgical History:   Procedure Laterality Date    LITHOTRIPSY      MANDIBLE SURGERY      OTHER SURGICAL HISTORY Left 5/8/2014    EXCISION OF OLECRANON BURSA    OTHER SURGICAL HISTORY  02/08/2018    Laparoscopic transabdominal right inguinal hernia repair with mesk and Excision of Lipoma of the Cord    TONSILLECTOMY         Medications Prior to Admission:    @  Prior to Admission medications    Medication Sig Start Date End Date Taking? Authorizing Provider   citalopram (CELEXA) 10 MG tablet Take 10 mg by mouth daily   Yes Historical Provider, MD   LORazepam (ATIVAN) 0.5 MG tablet Take 0.5 mg by mouth every 6 hours as needed for Anxiety.    Yes Historical Provider, MD   atorvastatin (LIPITOR) 40 MG tablet Take 1 tablet by mouth daily 6/4/20  Yes Lizet Hale, DO   lisinopril (PRINIVIL;ZESTRIL) 20 MG tablet Take 1 tablet by mouth daily 1/14/20  Yes Yesy Mesa, DO   allopurinol (ZYLOPRIM) 300 MG tablet TAKE ONE TABLET BY MOUTH EVERY DAY 1/14/20  Yes Yesy Mesa,    clopidogrel (PLAVIX) 75 MG tablet Take 75 mg by mouth every other day    Yes Historical Provider, MD   ibuprofen (ADVIL;MOTRIN) 800 MG tablet Take 1 tablet by mouth every 6 hours as needed for Pain 18   Felipa Darling MD       Allergies:  Patient has no known allergies. Social History:   Social History     Socioeconomic History    Marital status: Single     Spouse name: Not on file    Number of children: Not on file    Years of education: Not on file    Highest education level: Not on file   Occupational History    Not on file   Social Needs    Financial resource strain: Not on file    Food insecurity     Worry: Not on file     Inability: Not on file    Transportation needs     Medical: Not on file     Non-medical: Not on file   Tobacco Use    Smoking status: Former Smoker     Packs/day: 0.25     Years: 50.00     Pack years: 12.50     Types: Cigarettes     Last attempt to quit: 2015     Years since quittin.3    Smokeless tobacco: Never Used   Substance and Sexual Activity    Alcohol use:  Yes     Alcohol/week: 1.0 standard drinks     Types: 1 Cans of beer per week     Comment: social    Drug use: No    Sexual activity: Not on file   Lifestyle    Physical activity     Days per week: Not on file     Minutes per session: Not on file    Stress: Not on file   Relationships    Social connections     Talks on phone: Not on file     Gets together: Not on file     Attends Mandaen service: Not on file     Active member of club or organization: Not on file     Attends meetings of clubs or organizations: Not on file     Relationship status: Not on file    Intimate partner violence     Fear of current or ex partner: Not on file     Emotionally abused: Not on file     Physically abused: Not on file     Forced sexual activity: Not on file   Other Topics Concern    Not on file   Social History Narrative    Not on file       Family History:   Family History   Problem Relation Age of Onset    Heart Disease Mother     Stroke Mother     Coronary Art Dis Father        REVIEW OF SYSTEMS:    Gen: Patient denies any lightheadedness or dizziness. + Anxiety. No LOC or syncope. No fevers or chills. HEENT: No earache, sore throat or nasal congestion. Resp: Denies cough, hemoptysis or sputum production. Cardiac: Denies chest pain, +SOB, lower leg edema. No diaphoresis or palpitations. GI: No nausea, vomiting, diarrhea or constipation. No melena or hematochezia. : No urinary complaints, dysuria, hematuria or frequency. MSK: No extremity weakness, paralysis or paresthesias. PHYSICAL EXAM:    Vitals:  /69   Pulse 70   Temp 98.7 °F (37.1 °C) (Oral)   Resp 18   Ht 5' 9\" (1.753 m)   Wt 152 lb 9 oz (69.2 kg)   SpO2 98%   BMI 22.53 kg/m²     General:  This is a 78 y.o. yo male who is alert and oriented in NAD  HEENT:  Head is normocephalic and atraumatic, PERRLA, EOMI, mucus membranes moist with no pharyngeal erythema or exudate. Neck:  Supple with no carotid bruits, JVD or thyromegaly.   No cervical adenopathy  CV:  Regular rate and rhythm, 7-2/1 systolic murmurs  Lungs: Coarse decreased breath sounds(right greater than left) with mild scattered crackles right base to auscultation with no wheezes, or rhonchi  Abdomen:  Soft, nontender, nondistended, bowel sounds present  Extremities:  +2 lower leg edema, peripheral pulses intact bilaterally  Neuro:  Cranial nerves II-XII grossly intact; motor and sensory function intact with no focal deficits  Skin:  No rashes, lesions or wounds    DATA:  CBC with Differential:    Lab Results   Component Value Date    WBC 7.3 08/16/2020    RBC 4.03 08/16/2020    HGB 12.7 08/16/2020    HCT 39.3 08/16/2020     08/16/2020    MCV 97.5 08/16/2020    MCH 31.5 08/16/2020    MCHC 32.3 08/16/2020    RDW 14.9 08/16/2020    SEGSPCT 60 09/11/2013    LYMPHOPCT 16.3 08/14/2020    MONOPCT 7.1 08/14/2020    BASOPCT 0.4 08/14/2020    MONOSABS 0.55 08/14/2020    LYMPHSABS 1.26 08/14/2020    EOSABS 0.09 08/14/2020    BASOSABS 0.03 08/14/2020     CMP:    Lab Results Component Value Date     08/17/2020    K 3.8 08/17/2020    K 4.4 08/13/2020     08/17/2020    CO2 25 08/17/2020    BUN 21 08/17/2020    CREATININE 1.0 08/17/2020    GFRAA >60 08/17/2020    LABGLOM >60 08/17/2020    GLUCOSE 93 08/17/2020    GLUCOSE 101 09/21/2011    PROT 6.4 08/14/2020    LABALBU 3.8 08/14/2020    LABALBU 4.8 09/21/2011    CALCIUM 8.4 08/17/2020    BILITOT 0.9 08/14/2020    ALKPHOS 137 08/14/2020    AST 51 08/14/2020    ALT 73 08/14/2020     Magnesium:    Lab Results   Component Value Date    MG 1.7 08/14/2020     Phosphorus:    Lab Results   Component Value Date    PHOS 2.8 08/14/2020     PT/INR:    Lab Results   Component Value Date    PROTIME 12.3 08/22/2015    INR 1.0 08/22/2015     Troponin:    Lab Results   Component Value Date    TROPONINI 0.05 08/14/2020     U/A:    Lab Results   Component Value Date    COLORU Yellow 08/13/2020    PROTEINU TRACE 08/13/2020    PHUR 5.5 08/13/2020    WBCUA 1-3 08/13/2020    RBCUA >20 08/13/2020    BACTERIA RARE 08/13/2020    CLARITYU Clear 08/13/2020    SPECGRAV 1.025 08/13/2020    LEUKOCYTESUR Negative 08/13/2020    UROBILINOGEN 1.0 08/13/2020    BILIRUBINUR Negative 08/13/2020    BLOODU LARGE 08/13/2020    GLUCOSEU Negative 08/13/2020     ABG:  No results found for: PH, PCO2, PO2, HCO3, BE, THGB, TCO2, O2SAT  HgBA1c:    Lab Results   Component Value Date    LABA1C 6.0 08/15/2020     FLP:    Lab Results   Component Value Date    TRIG 64 08/14/2020    HDL 55 08/14/2020    LDLCALC 114 08/14/2020    LABVLDL 13 08/14/2020     TSH:    Lab Results   Component Value Date    TSH 2.190 12/06/2017     IRON:  No results found for: IRON  LIPASE:  No results found for: LIPASE    ASSESSMENT AND PLAN:      Patient Active Problem List    Diagnosis Date Noted    Congestive heart failure of unknown etiology ([address] 08/13/2020    Unilateral inguinal hernia 12/26/2017    Carotid artery stenosis 08/21/2015    Anxiety 02/26/2015    Gout 01/22/2015    Yajaira chaney L elbow 05/08/2014    Chronic gout of right elbow 06/27/2012    Gout synovitis 02/07/2012    Bursal abscess 10/05/2011    HTN (hypertension) 04/20/2011    OA (osteoarthritis) 04/20/2011    Hyperlipidemia 04/20/2011     1. Moderate right pleural effusion and small left pleural effusion  2. Acute systolic CHF-EF 78-07%  3. Frequent PVCs, 10 beat nonsustained V. Tach  4. Hypertension  5. Mild elevation of troponin  6. Hyperlipidemia  7. Anxiety/depression  8. Probable left ventricular apical thrombus  9.   Possible acute ischemic cardiomyopathy    Plan:  Home meds reviewed  Admit to [facility]  Heparin drip per cardiology  Procalcitonin 0.11  Lasix 60 mg IV push twice daily  KCl 20 mEq twice daily  Echocardiogram-EF 15-20%, severe global hypokinesis, filling defect left ventricular apex, moderately dilated right ventricle, moderate-severe MR, moderate TR, moderate PI  Cardiac diet with fluid and salt restriction  Toprol-XL 25 mg daily  NEW/Sandor Khalil Final cardiology      Marianne Young D.O.  8/17/2020  11:22 AM

## 2020-08-17 NOTE — PROGRESS NOTES
Comprehensive Nutrition Assessment    Type and Reason for Visit:  Initial, Positive Nutrition Screen    Nutrition Recommendations/Plan: Continue current diet. Nutrition Assessment:  Pt is nutritionally compromised 2/2 decreased intake PTA for several months r/t pt's long term partner becoming ill and passing. Pt reported they always cooked together and he has lost motivation to do this without him. Pt admitted w/ CHF and is pending heart cath tomorrow. Pt declined need for ONS d/t good intake since admission, will continue to monitor. Malnutrition Assessment:  Malnutrition Status: At risk for malnutrition (Comment)    Context:  Social/Environmental Circumstances     Findings of the 6 clinical characteristics of malnutrition:  Energy Intake:  1 - Less than 75% estimated energy requirements for 3 months or longer  Weight Loss:  No significant weight loss     Body Fat Loss:  No significant body fat loss     Muscle Mass Loss:  No significant muscle mass loss    Fluid Accumulation:  No significant fluid accumulation     Strength:  Not Performed    Estimated Daily Nutrient Needs:  Energy (kcal):  1374-8107; Weight Used for Energy Requirements:  Current     Protein (g):  (1.2-1.4 gm/kg); Weight Used for Protein Requirements:  Current        Fluid (ml/day):  7906-0017(1 ml/kcal);  Weight Used for Fluid Requirements:         Nutrition Related Findings:  abd WDL, +BS, -2.3L I/O, +2 BLE edema      Wounds:  None       Current Nutrition Therapies:    DIET CARDIAC; Low Sodium (2 GM)  Diet NPO, After Midnight Exceptions are: Sips with Meds    Anthropometric Measures:  · Height: 5' 9\" (175.3 cm)  · Current Body Weight: 152 lb 9 oz (69.2 kg)(8/17 bed scale)   · Admission Body Weight: 144 lb 8 oz (65.5 kg)(8/15 bed scale)    · Usual Body Weight: 160 lb 12.8 oz (72.9 kg)(1/14/20 actual per EMR)     · Ideal Body Weight: 160 lbs; % Ideal Body Weight 95.4 %   · BMI: 22.5  · Adjusted Body Weight:  ; No Adjustment · BMI Categories: Normal Weight (BMI 22.0 to 24.9) age over 72       Nutrition Diagnosis:   · Inadequate oral intake related to psychological cause or life stress as evidenced by poor intake prior to admission, weight loss      Nutrition Interventions:   Food and/or Nutrient Delivery:  Continue Current Diet  Nutrition Education/Counseling:  Education initiated(Discussed meal programs and/or oral nutrition supplements, pt reported that he has excellent support through his late partners family and plans to begin focusing on self-care after discharge.  Emotional support provided)   Coordination of Nutrition Care:  Continued Inpatient Monitoring    Goals:  Pt is to continue to consume >75% of most meals       Nutrition Monitoring and Evaluation:   Behavioral-Environmental Outcomes:  Readiness for Change   Food/Nutrient Intake Outcomes:  Food and Nutrient Intake  Physical Signs/Symptoms Outcomes:  Biochemical Data, Fluid Status or Edema, Nutrition Focused Physical Findings, Skin, Weight     Discharge Planning:    No discharge needs at this time     Electronically signed by Shoaib Contreras RD, LD on 8/17/20 at 3:08 PM EDT    Contact: [phone]

## 2020-08-17 NOTE — PROGRESS NOTES
INPATIENT CARDIOLOGY FOLLOW-UP    Name: Cande English    Age: 78 y.o.     Date of Admission: 8/13/2020  5:45 PM    Date of Service: 8/17/2020    Interim History:  - minimal diuresis  - no Vtach on telemetry    Review of Systems:   Cardiac: As per HPI  General: No fever, chills  Pulmonary: As per HPI  HEENT: No visual disturbances, difficult swallowing  GI: No nausea, vomiting  Endocrine: No thyroid disease or DM  Musculoskeletal: URIARTE x 4, no focal motor deficits  Skin: Intact, no rashes  Neuro/Psych: No headache or seizures    Problem List:  Patient Active Problem List   Diagnosis    HTN (hypertension)    OA (osteoarthritis)    Hyperlipidemia    Bursal abscess    Gout synovitis    Chronic gout of right elbow    Gouty tophus L elbow    Gout    Anxiety    Carotid artery stenosis    Unilateral inguinal hernia    Congestive heart failure of unknown etiology (HCC)       Allergies:  No Known Allergies    Current Medications:  Current Facility-Administered Medications   Medication Dose Route Frequency Provider Last Rate Last Dose    furosemide (LASIX) injection 40 mg  40 mg Intravenous TID Tia Sesay MD        metoprolol succinate (TOPROL XL) extended release tablet 25 mg  25 mg Oral Daily Weston Lee MD   25 mg at 08/16/20 1300    heparin (porcine) injection 5,220 Units  80 Units/kg Intravenous PRN BORA Gilmore-NEW        heparin (porcine) injection 2,610 Units  40 Units/kg Intravenous PRN Cam Rivera PA-C        heparin 25,000 units in dextrose 5% 250 mL infusion  18 Units/kg/hr Intravenous Continuous Parish Barajas PA-C 7.8 mL/hr at 08/16/20 2156 12 Units/kg/hr at 08/16/20 2156    aspirin chewable tablet 81 mg  81 mg Oral Daily Cam Rivera PA-C   81 mg at 08/16/20 1019    ibuprofen (ADVIL;MOTRIN) tablet 800 mg  800 mg Oral Q6H PRN Francine Ruano DO        allopurinol (ZYLOPRIM) tablet 300 mg  300 mg Oral Daily Francine Ruano DO   300 mg at 08/16/20 1019    Labs     08/15/20  0530 08/16/20  0726    141   K 3.7 3.9    101   CO2 24 26   BUN 23 23   CREATININE 1.1 1.1   GLUCOSE 104* 98   CALCIUM 8.3* 8.8     Lab Results   Component Value Date    MG 1.7 08/14/2020     No results for input(s): ALKPHOS, ALT, AST, PROT, BILITOT, BILIDIR, LABALBU in the last 72 hours. Recent Labs     08/14/20  1441 08/15/20  0530 08/16/20  0726   WBC 7.7  --  7.3   RBC 4.26  --  4.03   HGB 13.4 12.1* 12.7   HCT 40.9 37.5 39.3   MCV 96.0  --  97.5   MCH 31.5  --  31.5   MCHC 32.8  --  32.3   RDW 14.7  --  14.9     --  176   MPV 11.0  --  11.6     Lab Results   Component Value Date    CKTOTAL 105 08/22/2015    CKMB 1.7 08/22/2015    TROPONINI 0.05 (H) 08/14/2020    TROPONINI 0.05 (H) 08/13/2020    TROPONINI <0.01 08/22/2015     Lab Results   Component Value Date    INR 1.0 08/22/2015    INR 1.0 08/19/2015    PROTIME 12.3 08/22/2015    PROTIME 12.3 08/19/2015     Lab Results   Component Value Date    TSH 2.190 12/06/2017     Lab Results   Component Value Date    LABA1C 6.0 (H) 08/15/2020     No results found for: EAG  Lab Results   Component Value Date    CHOL 182 08/14/2020    CHOL 145 06/23/2020    CHOL 150 12/10/2019     Lab Results   Component Value Date    TRIG 64 08/14/2020    TRIG 44 06/23/2020    TRIG 45 12/10/2019     Lab Results   Component Value Date    HDL 55 08/14/2020    HDL 66 06/23/2020    HDL 72 12/10/2019     Lab Results   Component Value Date    LDLCALC 114 (H) 08/14/2020    LDLCALC 70 06/23/2020    LDLCALC 69 12/10/2019     Lab Results   Component Value Date    LABVLDL 13 08/14/2020    LABVLDL 9 06/23/2020    LABVLDL 9 12/10/2019     No results found for: CHOLHDYANELIS  No results for input(s): PROBNP in the last 72 hours.     ASSESSMENT / PLAN:  65-year-old male with past medical history of peripheral artery disease post carotid stent on the right side comes with congestive heart failure found to be in new onset heart failure reduced ejection fraction with ejection fraction of 15 to 20% global hypokinesis with moderate to severe mitral regurgitation. There is also a possible LV thrombus for which she was started on heparin. Patient had no chest pain. Since admission, patient has been diuresed. Recommendations  Patient is still overtly fluid overloaded. Increase IV diuresis. Stop Entresto. There is no indication for this medication at this point. N.p.o. after midnight. We will arrange for left heart catheterization tomorrow.         Blanca Smith MD  [facility]) Cardiology

## 2020-08-17 NOTE — CARE COORDINATION
SOCIAL WORK / DISCHARGE PLANNING:  COVID testing not done. Sw met with pt at bedside, affect is improved today but he is quite nervous about heart cath to be done next date at [address]. Realistic reassurance provided. He does not want to consider [address] anymore at dc, despite his APOLINAR previously encouraging to to have [address]. He is anxious to return home, denies any other dc needs at this time. Juanita Elder will provide home going transport.                      Electronically signed by CARTER Garcia on 8/17/2020 at 1:35 PM

## 2020-08-17 NOTE — PLAN OF CARE
Patient free from falls this shift. Patient denies any pain or discomfort. Edema being managed per medication orders. Patient denies any chest pain or shortness of breath. Patient attests to anxiety and depression r/t recent loss of a loved one. RN spent time with patient allowing for open communication and emotional support, encouraged patient to express feelings. Will continue to monitor at this time.

## 2020-08-18 ENCOUNTER — HOSPITAL ENCOUNTER (INPATIENT)
Dept: CARDIAC CATH/INVASIVE PROCEDURES | Age: 70
LOS: 1 days | Discharge: HOME | DRG: 286 | End: 2020-08-21
Attending: INTERNAL MEDICINE | Admitting: INTERNAL MEDICINE
Payer: MEDICARE

## 2020-08-18 VITALS
TEMPERATURE: 97.6 F | DIASTOLIC BLOOD PRESSURE: 69 MMHG | OXYGEN SATURATION: 97 % | BODY MASS INDEX: 22.02 KG/M2 | RESPIRATION RATE: 16 BRPM | WEIGHT: 148.7 LBS | SYSTOLIC BLOOD PRESSURE: 109 MMHG | HEIGHT: 69 IN | HEART RATE: 70 BPM

## 2020-08-18 PROBLEM — I25.10 CAD IN NATIVE ARTERY: Status: ACTIVE | Noted: 2020-08-18

## 2020-08-18 PROBLEM — I50.21 ACUTE SYSTOLIC CHF (CONGESTIVE HEART FAILURE) (HCC): Status: ACTIVE | Noted: 2020-08-18

## 2020-08-18 PROBLEM — I25.5 ISCHEMIC CARDIOMYOPATHY: Status: ACTIVE | Noted: 2020-08-18

## 2020-08-18 LAB
ABO/RH: NORMAL
ANION GAP SERPL CALCULATED.3IONS-SCNC: 12 MMOL/L (ref 7–16)
ANTIBODY SCREEN: NORMAL
APTT: >240 SEC (ref 24.5–35.1)
BUN BLDV-MCNC: 20 MG/DL (ref 8–23)
CALCIUM SERPL-MCNC: 8.8 MG/DL (ref 8.6–10.2)
CHLORIDE BLD-SCNC: 99 MMOL/L (ref 98–107)
CO2: 28 MMOL/L (ref 22–29)
CREAT SERPL-MCNC: 1 MG/DL (ref 0.7–1.2)
GFR AFRICAN AMERICAN: >60
GFR NON-AFRICAN AMERICAN: >60 ML/MIN/1.73
GLUCOSE BLD-MCNC: 98 MG/DL (ref 74–99)
HCT VFR BLD CALC: 38.4 % (ref 37–54)
HCT VFR BLD CALC: 40.2 % (ref 37–54)
HEMOGLOBIN: 12.7 G/DL (ref 12.5–16.5)
HEMOGLOBIN: 13 G/DL (ref 12.5–16.5)
MAGNESIUM: 1.8 MG/DL (ref 1.6–2.6)
MCH RBC QN AUTO: 31.3 PG (ref 26–35)
MCH RBC QN AUTO: 31.4 PG (ref 26–35)
MCHC RBC AUTO-ENTMCNC: 32.3 % (ref 32–34.5)
MCHC RBC AUTO-ENTMCNC: 33.1 % (ref 32–34.5)
MCV RBC AUTO: 95 FL (ref 80–99.9)
MCV RBC AUTO: 96.9 FL (ref 80–99.9)
PDW BLD-RTO: 14.7 FL (ref 11.5–15)
PDW BLD-RTO: 14.7 FL (ref 11.5–15)
PLATELET # BLD: 182 E9/L (ref 130–450)
PLATELET # BLD: 182 E9/L (ref 130–450)
PMV BLD AUTO: 11.3 FL (ref 7–12)
PMV BLD AUTO: 11.6 FL (ref 7–12)
POTASSIUM SERPL-SCNC: 4.4 MMOL/L (ref 3.5–5)
RBC # BLD: 4.04 E12/L (ref 3.8–5.8)
RBC # BLD: 4.15 E12/L (ref 3.8–5.8)
SODIUM BLD-SCNC: 139 MMOL/L (ref 132–146)
WBC # BLD: 7.8 E9/L (ref 4.5–11.5)
WBC # BLD: 9.6 E9/L (ref 4.5–11.5)

## 2020-08-18 PROCEDURE — 6360000002 HC RX W HCPCS

## 2020-08-18 PROCEDURE — 6370000000 HC RX 637 (ALT 250 FOR IP): Performed by: INTERNAL MEDICINE

## 2020-08-18 PROCEDURE — C1887 CATHETER, GUIDING: HCPCS

## 2020-08-18 PROCEDURE — C1894 INTRO/SHEATH, NON-LASER: HCPCS

## 2020-08-18 PROCEDURE — 6370000000 HC RX 637 (ALT 250 FOR IP): Performed by: PHYSICIAN ASSISTANT

## 2020-08-18 PROCEDURE — G0378 HOSPITAL OBSERVATION PER HR: HCPCS

## 2020-08-18 PROCEDURE — 86900 BLOOD TYPING SEROLOGIC ABO: CPT

## 2020-08-18 PROCEDURE — 4A023N7 MEASUREMENT OF CARDIAC SAMPLING AND PRESSURE, LEFT HEART, PERCUTANEOUS APPROACH: ICD-10-PCS | Performed by: INTERNAL MEDICINE

## 2020-08-18 PROCEDURE — 86850 RBC ANTIBODY SCREEN: CPT

## 2020-08-18 PROCEDURE — 2500000003 HC RX 250 WO HCPCS

## 2020-08-18 PROCEDURE — 85730 THROMBOPLASTIN TIME PARTIAL: CPT

## 2020-08-18 PROCEDURE — 2580000003 HC RX 258: Performed by: INTERNAL MEDICINE

## 2020-08-18 PROCEDURE — 86901 BLOOD TYPING SEROLOGIC RH(D): CPT

## 2020-08-18 PROCEDURE — 2709999900 HC NON-CHARGEABLE SUPPLY

## 2020-08-18 PROCEDURE — C1769 GUIDE WIRE: HCPCS

## 2020-08-18 PROCEDURE — 85027 COMPLETE CBC AUTOMATED: CPT

## 2020-08-18 PROCEDURE — G0379 DIRECT REFER HOSPITAL OBSERV: HCPCS

## 2020-08-18 PROCEDURE — 93458 L HRT ARTERY/VENTRICLE ANGIO: CPT

## 2020-08-18 PROCEDURE — B2110ZZ FLUOROSCOPY OF MULTIPLE CORONARY ARTERIES USING HIGH OSMOLAR CONTRAST: ICD-10-PCS | Performed by: INTERNAL MEDICINE

## 2020-08-18 PROCEDURE — 36415 COLL VENOUS BLD VENIPUNCTURE: CPT

## 2020-08-18 PROCEDURE — 6360000002 HC RX W HCPCS: Performed by: PHYSICIAN ASSISTANT

## 2020-08-18 PROCEDURE — 93458 L HRT ARTERY/VENTRICLE ANGIO: CPT | Performed by: INTERNAL MEDICINE

## 2020-08-18 PROCEDURE — 80048 BASIC METABOLIC PNL TOTAL CA: CPT

## 2020-08-18 PROCEDURE — 83735 ASSAY OF MAGNESIUM: CPT

## 2020-08-18 RX ORDER — POTASSIUM CHLORIDE 20 MEQ/1
20 TABLET, EXTENDED RELEASE ORAL
Status: DISCONTINUED | OUTPATIENT
Start: 2020-08-19 | End: 2020-08-19

## 2020-08-18 RX ORDER — CITALOPRAM 20 MG/1
10 TABLET ORAL DAILY
Status: DISCONTINUED | OUTPATIENT
Start: 2020-08-18 | End: 2020-08-21 | Stop reason: HOSPADM

## 2020-08-18 RX ORDER — ALLOPURINOL 300 MG/1
300 TABLET ORAL DAILY
Status: DISCONTINUED | OUTPATIENT
Start: 2020-08-19 | End: 2020-08-21 | Stop reason: HOSPADM

## 2020-08-18 RX ORDER — HEPARIN SODIUM 10000 [USP'U]/100ML
12 INJECTION, SOLUTION INTRAVENOUS CONTINUOUS
Status: DISCONTINUED | OUTPATIENT
Start: 2020-08-18 | End: 2020-08-20

## 2020-08-18 RX ORDER — METOPROLOL SUCCINATE 25 MG/1
25 TABLET, EXTENDED RELEASE ORAL DAILY
Status: DISCONTINUED | OUTPATIENT
Start: 2020-08-18 | End: 2020-08-21 | Stop reason: HOSPADM

## 2020-08-18 RX ORDER — ACETAMINOPHEN 325 MG/1
650 TABLET ORAL EVERY 4 HOURS PRN
Status: DISCONTINUED | OUTPATIENT
Start: 2020-08-18 | End: 2020-08-21 | Stop reason: HOSPADM

## 2020-08-18 RX ORDER — FUROSEMIDE 10 MG/ML
60 INJECTION INTRAMUSCULAR; INTRAVENOUS 3 TIMES DAILY
Status: DISCONTINUED | OUTPATIENT
Start: 2020-08-18 | End: 2020-08-18

## 2020-08-18 RX ORDER — SODIUM CHLORIDE 0.9 % (FLUSH) 0.9 %
10 SYRINGE (ML) INJECTION PRN
Status: DISCONTINUED | OUTPATIENT
Start: 2020-08-18 | End: 2020-08-21 | Stop reason: HOSPADM

## 2020-08-18 RX ORDER — LORAZEPAM 0.5 MG/1
0.5 TABLET ORAL EVERY 6 HOURS PRN
Status: DISCONTINUED | OUTPATIENT
Start: 2020-08-18 | End: 2020-08-21 | Stop reason: HOSPADM

## 2020-08-18 RX ORDER — ATORVASTATIN CALCIUM 40 MG/1
40 TABLET, FILM COATED ORAL NIGHTLY
Status: DISCONTINUED | OUTPATIENT
Start: 2020-08-19 | End: 2020-08-21 | Stop reason: HOSPADM

## 2020-08-18 RX ORDER — METOPROLOL SUCCINATE 25 MG/1
25 TABLET, EXTENDED RELEASE ORAL DAILY
Qty: 30 TABLET | Refills: 3 | Status: ON HOLD | COMMUNITY
Start: 2020-08-19 | End: 2020-08-20 | Stop reason: HOSPADM

## 2020-08-18 RX ORDER — ACETAMINOPHEN 500 MG
500 TABLET ORAL EVERY 6 HOURS PRN
Status: DISCONTINUED | OUTPATIENT
Start: 2020-08-18 | End: 2020-08-21 | Stop reason: HOSPADM

## 2020-08-18 RX ORDER — ASPIRIN 81 MG/1
81 TABLET, CHEWABLE ORAL DAILY
Qty: 30 TABLET | Refills: 3 | Status: ON HOLD | COMMUNITY
Start: 2020-08-19 | End: 2020-08-20 | Stop reason: SDUPTHER

## 2020-08-18 RX ORDER — SODIUM CHLORIDE 0.9 % (FLUSH) 0.9 %
10 SYRINGE (ML) INJECTION EVERY 12 HOURS SCHEDULED
Status: DISCONTINUED | OUTPATIENT
Start: 2020-08-18 | End: 2020-08-21 | Stop reason: HOSPADM

## 2020-08-18 RX ORDER — HEPARIN SODIUM 1000 [USP'U]/ML
30 INJECTION, SOLUTION INTRAVENOUS; SUBCUTANEOUS PRN
Status: DISCONTINUED | OUTPATIENT
Start: 2020-08-18 | End: 2020-08-21 | Stop reason: HOSPADM

## 2020-08-18 RX ORDER — HEPARIN SODIUM 1000 [USP'U]/ML
60 INJECTION, SOLUTION INTRAVENOUS; SUBCUTANEOUS PRN
Status: DISCONTINUED | OUTPATIENT
Start: 2020-08-18 | End: 2020-08-21 | Stop reason: HOSPADM

## 2020-08-18 RX ORDER — VITAMIN B COMPLEX
1000 TABLET ORAL DAILY
Status: DISCONTINUED | OUTPATIENT
Start: 2020-08-18 | End: 2020-08-21 | Stop reason: HOSPADM

## 2020-08-18 RX ORDER — ASPIRIN 81 MG/1
81 TABLET, CHEWABLE ORAL DAILY
Status: DISCONTINUED | OUTPATIENT
Start: 2020-08-19 | End: 2020-08-21 | Stop reason: HOSPADM

## 2020-08-18 RX ADMIN — CHOLECALCIFEROL TAB 25 MCG (1000 UNIT) 1000 UNITS: 25 TAB at 09:59

## 2020-08-18 RX ADMIN — POTASSIUM CHLORIDE 20 MEQ: 1500 TABLET, EXTENDED RELEASE ORAL at 09:59

## 2020-08-18 RX ADMIN — CITALOPRAM HYDROBROMIDE 10 MG: 20 TABLET ORAL at 09:59

## 2020-08-18 RX ADMIN — ATORVASTATIN CALCIUM 40 MG: 40 TABLET, FILM COATED ORAL at 09:59

## 2020-08-18 RX ADMIN — VITAMIN D 1000 UNITS: 25 TAB ORAL at 23:20

## 2020-08-18 RX ADMIN — CITALOPRAM 10 MG: 20 TABLET, FILM COATED ORAL at 23:20

## 2020-08-18 RX ADMIN — ASPIRIN 81 MG CHEWABLE TABLET 81 MG: 81 TABLET CHEWABLE at 09:59

## 2020-08-18 RX ADMIN — SODIUM CHLORIDE, PRESERVATIVE FREE 10 ML: 5 INJECTION INTRAVENOUS at 23:21

## 2020-08-18 RX ADMIN — HEPARIN SODIUM 11.95 UNITS/KG/HR: 10000 INJECTION, SOLUTION INTRAVENOUS at 04:43

## 2020-08-18 RX ADMIN — ALLOPURINOL 300 MG: 300 TABLET ORAL at 10:00

## 2020-08-18 ASSESSMENT — PAIN SCALES - GENERAL
PAINLEVEL_OUTOF10: 0

## 2020-08-18 NOTE — PROGRESS NOTES
Report called to the cath lab.  They are aware that metoprolol was held this morning per cardiology NP due to hypotension

## 2020-08-18 NOTE — PROGRESS NOTES
Patient set up for transfer via [facility] for  at 1030am to go to [facility] cath lab via stretcher. Patient will not transfer with Heparin drip.  Per Cardiology turn off at 1030am.

## 2020-08-18 NOTE — DISCHARGE INSTR - COC
Continuity of Care Form    Patient Name: Elida Mesa   :  1950  MRN:  [id number]    Admit date:  2020  Discharge date:  ***    Code Status Order: Prior   Advance Directives:   [address] Directive Type of Healthcare Directive Copy in [address] Agent's Name Healthcare Agent's Phone Number    [phone]  No, patient does not have an advance directive for healthcare treatment -- -- -- -- --          Admitting Physician:  Freddie Richards DO  PCP: Christine Moreno DO    Discharging Nurse: [facility] Unit/Room#: 200/65-12  Discharging Unit Phone Number: ***    Emergency Contact:   Extended Emergency Contact Information  Primary Emergency Contact: Claudia Lane  Address: NOT LISTED BY PATIENT   [address] Phone: [phone]  Mobile Phone: [phone]  Relation: Other   needed? No  Secondary Emergency Contact: Cam Leggett  Address: NOT LISTED BY PATIENT  [place] Phone: [phone]  Mobile Phone: [phone]  Relation: Other   needed?  No    Past Surgical History:  Past Surgical History:   Procedure Laterality Date    LITHOTRIPSY      MANDIBLE SURGERY      OTHER SURGICAL HISTORY Left 2014    EXCISION OF OLECRANON BURSA    OTHER SURGICAL HISTORY  2018    Laparoscopic transabdominal right inguinal hernia repair with mesk and Excision of Lipoma of the Cord    TONSILLECTOMY         Immunization History:   Immunization History   Administered Date(s) Administered    Influenza, Quadv, IM, (6 mo and older Fluzone, Flulaval, Fluarix and 3 yrs and older Afluria) 2019, 2020    Pneumococcal Polysaccharide ([id number]) 2018       Active Problems:  Patient Active Problem List   Diagnosis Code    HTN (hypertension) I10    OA (osteoarthritis) M19.90    Hyperlipidemia E78.5    Bursal abscess M71.00    Gout synovitis M10.9    Chronic gout of right elbow M1A.0210    Gouty tophus L elbow M1A. 9XX1    Gout M10.9    Anxiety F41.9    Carotid artery stenosis I65.29    Unilateral inguinal hernia K40.90    Congestive heart failure of unknown etiology (HCC) I50.9       Isolation/Infection:   Isolation          No Isolation        Patient Infection Status     None to display          Nurse Assessment:  Last Vital Signs: /69   Pulse 70   Temp 97.6 °F (36.4 °C) (Oral)   Resp 16   Ht 5' 9\" (1.753 m)   Wt 148 lb 11.2 oz (67.4 kg)   SpO2 97%   BMI 21.96 kg/m²     Last documented pain score (0-10 scale): Pain Level: 0  Last Weight:   Wt Readings from Last 1 Encounters:   08/18/20 148 lb 11.2 oz (67.4 kg)     Mental Status:  {IP PT MENTAL STATUS:20030}    IV Access:  { SUNSHINE IV ACCESS:758621827}    Nursing Mobility/ADLs:  Walking   {[facility] DME SZSC:769390864}  Transfer  {[facility] DME HWRA:576099212}  Bathing  {[facility] DME DRYN:426884336}  Dressing  {[facility] DME TAMF:401201969}  Toileting  {[facility] DME GHYJ:073343457}  Feeding  {[facility] DME JAKN:722005769}  Med Admin  {[facility] DME IXGU:613579808}  Med Delivery   { SUNSHINE MED Delivery:411182244}    Wound Care Documentation and Therapy:  Incision 02/08/18 Abdomen (Active)   Number of days: 921        Elimination:  Continence:   · Bowel: {YES / NM:58312}  · Bladder: {YES / BC:72503}  Urinary Catheter: {Urinary Catheter:538690364}   Colostomy/Ileostomy/Ileal Conduit: {YES / VB:40115}       Date of Last BM: ***    Intake/Output Summary (Last 24 hours) at 8/18/2020 1033  Last data filed at 8/18/2020 0824  Gross per 24 hour   Intake 120 ml   Output 300 ml   Net -180 ml     I/O last 3 completed shifts:   In: 120 [P.O.:120]  Out: 600 [Urine:600]    Safety Concerns:     [address] [employer] Safety Concerns:309484425}    Impairments/Disabilities:      [employer] Impairments/Disabilities:078872918}    Nutrition Therapy:  Current Nutrition Therapy:   [address] R[address] GONSALEZ Diet List:399982944}    Routes of Feeding: {CHP DME Other Feedings:226898553}  Liquids: {Slp liquid thickness:07265}  Daily Fluid Restriction: {CHP DME Yes amt example:658737342}  Last Modified Barium Swallow with Video (Video Swallowing Test): {Done Not Done HKUK:732978547}    Treatments at the Time of Hospital Discharge:   Respiratory Treatments: ***  Oxygen Therapy:  {Therapy; copd oxygen:00940}  Ventilator:    {MH CC Vent AOUW:311565043}    Rehab Therapies: {THERAPEUTIC INTERVENTION:7924038539}  Weight Bearing Status/Restrictions: { CC Weight Bearin}  Other Medical Equipment (for information only, NOT a DME order):  {EQUIPMENT:409871669}  Other Treatments: ***    Patient's personal belongings (please select all that are sent with patient):  {CHP DME Belongings:376893418}    RN SIGNATURE:  {Esignature:216024515}    CASE MANAGEMENT/SOCIAL WORK SECTION    Inpatient Status Date: ***    Readmission Risk Assessment Score:  Readmission Risk              Risk of Unplanned Readmission:        8           Discharging to Facility/ Agency   · Name:   · Address:  · Phone:  · Fax:    Dialysis Facility (if applicable)   · Name:  · Address:  · Dialysis Schedule:  · Phone:  · Fax:    / signature: {Esignature:093087778}    PHYSICIAN SECTION    Prognosis: {Prognosis:0428537084}    Condition at Discharge: [facility] Patient Condition:376124618}    Rehab Potential (if transferring to Rehab): {Prognosis:3111572070}    Recommended Labs or Other Treatments After Discharge: ***    Physician Certification: I certify the above information and transfer of Anahi Ray  is necessary for the continuing treatment of the diagnosis listed and that he requires {Admit to Appropriate Level of Care:90227} for {GREATER/LESS:796940897} 30 days.      Update Admission H&P: {CHP DME Changes in ELYIU:272390677}    PHYSICIAN SIGNATURE:  {Esignature:792310541}

## 2020-08-18 NOTE — DISCHARGE SUMMARY
Department of Internal Medicine        CHIEF COMPLAINT: Exertional dyspnea, anxiety, bilateral lower leg edema    Reason for Admission: Right pleural effusion, CHF    HISTORY OF PRESENT ILLNESS:      The patient is a 78 y.o. male who presents with exertional dyspnea and leg edema that is started about a month ago and is getting worse. Patient also complains of increased anxiety with his significant other passing away end of May 2020. Patient was put on Ativan and Celexa which is not helping a lot. Patient denies any chest pain, abdominal pain, nausea/vomiting, fever/chills, dizziness or syncope. In the ED was noted the patient was having frequent PVCs along with a mildly elevated troponin of 0.05.  proBNP was 11,000 and chest x-ray showed moderate right pleural effusion with small left pleural effusion. 8/15/2020  Patient seen and examined on [facility]. Patient denies any chest pain, abdominal pain, nausea/vomiting. Patient does get somewhat short of breath with activity still. Cardiology notes reviewed. BUN/creatinine still 23/1.1 with hemoglobin 12.1. Temperature is 97.4 with heart rate 75 blood pressure currently 96/59. Urinary output is only about 350 cc a shift. O2 sats 97% on room air at rest.  Patient started on heparin drip yesterday with possible left ventricle apical thrombus along with severe cardiomyopathy with a EF about 20%. Patient also noted to have moderate TR, moderate-severe MR, moderate PI. Echocardiogram and test results reviewed with patient again today. Patient started on Entresto. 8/16/2020  Patient seen and examined on [facility]. Patient denies any chest pain, abdominal pain, nausea/vomiting. Patient breathing is getting better. Case discussed with patient family member at the bedside. Cardiology note reviewed. BUN/creatinine was 23/1.1 with hemoglobin 12.7 WBC 7.3. Temperature 97.4 with blood pressure 88/60. O2 sats 92% on room air.   Urinary output ranges 400- 1300 cc a shift.    8/17/2020  Patient seen and examined on [facility]. Patient is still very anxious. Patient denies any chest pain, abdominal pain, nausea or vomiting. Patient has some mild dyspnea with exertion. Cardiology note reviewed. Temperature is 98.7 with heart rate of 70. Blood pressure currently 100/69. O2 sats 98% on room air. Urine output ranges 200-650 cc a shift. BUN/creatinine is 21/1.0. Heparin drip will be stopped when the patient is sent for heart catheterization tomorrow at [facility].    8/18/2020  Patient seen and examined on [facility]. BUN/creatinine is 20/1.0. WBC 7.8 with hemoglobin 13. Temperature is 97.6 with a heart rate of 70. Blood pressure ranges 82//69. There is essentially no urinary output measured over the last 16 hours in the computer. O2 sats 97% on room air at rest.  Patient is set up for transfer to [facility] today for heart catheterization. 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,     Gout     History of cardiovascular stress test 07/2015    Lexiscan stress test    Hypertension     Kidney stone     stent/    Osteoarthritis      Past Surgical History:    Past Surgical History:   Procedure Laterality Date    LITHOTRIPSY      MANDIBLE SURGERY      OTHER SURGICAL HISTORY Left 5/8/2014    EXCISION OF OLECRANON BURSA    OTHER SURGICAL HISTORY  02/08/2018    Laparoscopic transabdominal right inguinal hernia repair with mesk and Excision of Lipoma of the Cord    TONSILLECTOMY         Medications Prior to Admission:    @  Prior to Admission medications    Medication Sig Start Date End Date Taking?  Authorizing Provider   aspirin 81 MG chewable tablet Take 1 tablet by mouth daily 8/19/20  Yes Rachel Mayes,    metoprolol succinate (TOPROL XL) 25 MG extended release tablet Take 1 tablet by mouth daily 8/19/20  Yes Rachel Mayes, DO citalopram (CELEXA) 10 MG tablet Take 10 mg by mouth daily   Yes Historical Provider, MD   LORazepam (ATIVAN) 0.5 MG tablet Take 0.5 mg by mouth every 6 hours as needed for Anxiety. Yes Historical Provider, MD   atorvastatin (LIPITOR) 40 MG tablet Take 1 tablet by mouth daily 20  Yes Aleshia Linn,    allopurinol (ZYLOPRIM) 300 MG tablet TAKE ONE TABLET BY MOUTH EVERY DAY 20  Yes Elsa Koch DO   ibuprofen (ADVIL;MOTRIN) 800 MG tablet Take 1 tablet by mouth every 6 hours as needed for Pain 18   Pam Moreira MD       Allergies:  Patient has no known allergies. Social History:   Social History     Socioeconomic History    Marital status: Single     Spouse name: Not on file    Number of children: Not on file    Years of education: Not on file    Highest education level: Not on file   Occupational History    Not on file   Social Needs    Financial resource strain: Not on file    Food insecurity     Worry: Not on file     Inability: Not on file    Transportation needs     Medical: Not on file     Non-medical: Not on file   Tobacco Use    Smoking status: Former Smoker     Packs/day: 0.25     Years: 50.00     Pack years: 12.50     Types: Cigarettes     Last attempt to quit: 2015     Years since quittin.3    Smokeless tobacco: Never Used   Substance and Sexual Activity    Alcohol use:  Yes     Alcohol/week: 1.0 standard drinks     Types: 1 Cans of beer per week     Comment: social    Drug use: No    Sexual activity: Not on file   Lifestyle    Physical activity     Days per week: Not on file     Minutes per session: Not on file    Stress: Not on file   Relationships    Social connections     Talks on phone: Not on file     Gets together: Not on file     Attends [employer] service: Not on file     Active member of club or organization: Not on file     Attends meetings of clubs or organizations: Not on file     Relationship status: Not on file    Intimate partner violence     Fear of current or ex partner: Not on file     Emotionally abused: Not on file     Physically abused: Not on file     Forced sexual activity: Not on file   Other Topics Concern    Not on file   Social History Narrative    Not on file       Family History:   Family History   Problem Relation Age of Onset    Heart Disease Mother     Stroke Mother     Coronary Art Dis Father        REVIEW OF SYSTEMS:    Gen: Patient denies any lightheadedness or dizziness. + Anxiety. No LOC or syncope. No fevers or chills. HEENT: No earache, sore throat or nasal congestion. Resp: Denies cough, hemoptysis or sputum production. Cardiac: Denies chest pain, +SOB, lower leg edema. No diaphoresis or palpitations. GI: No nausea, vomiting, diarrhea or constipation. No melena or hematochezia. : No urinary complaints, dysuria, hematuria or frequency. MSK: No extremity weakness, paralysis or paresthesias. PHYSICAL EXAM:    Vitals:  /69   Pulse 70   Temp 97.6 °F (36.4 °C) (Oral)   Resp 16   Ht 5' 9\" (1.753 m)   Wt 148 lb 11.2 oz (67.4 kg)   SpO2 97%   BMI 21.96 kg/m²     General:  This is a 78 y.o. yo male who is alert and oriented in NAD  HEENT:  Head is normocephalic and atraumatic, PERRLA, EOMI, mucus membranes moist with no pharyngeal erythema or exudate. Neck:  Supple with no carotid bruits, JVD or thyromegaly.   No cervical adenopathy  CV:  Regular rate and rhythm, 2-8/9 systolic murmurs  Lungs: Coarse decreased breath sounds(right greater than left) with mild scattered crackles right base to auscultation with no wheezes, or rhonchi  Abdomen:  Soft, nontender, nondistended, bowel sounds present  Extremities:  +2 lower leg edema, peripheral pulses intact bilaterally  Neuro:  Cranial nerves II-XII grossly intact; motor and sensory function intact with no focal deficits  Skin:  No rashes, lesions or wounds    DATA:  CBC with Differential:    Lab Results   Component Value Date    WBC 7.8 08/18/2020    RBC 4.15 08/18/2020    HGB 13.0 08/18/2020    HCT 40.2 08/18/2020     08/18/2020    MCV 96.9 08/18/2020    MCH 31.3 08/18/2020    MCHC 32.3 08/18/2020    RDW 14.7 08/18/2020    SEGSPCT 60 09/11/2013    LYMPHOPCT 16.3 08/14/2020    MONOPCT 7.1 08/14/2020    BASOPCT 0.4 08/14/2020    MONOSABS 0.55 08/14/2020    LYMPHSABS 1.26 08/14/2020    EOSABS 0.09 08/14/2020    BASOSABS 0.03 08/14/2020     CMP:    Lab Results   Component Value Date     08/18/2020    K 4.4 08/18/2020    K 4.4 08/13/2020    CL 99 08/18/2020    CO2 28 08/18/2020    BUN 20 08/18/2020    CREATININE 1.0 08/18/2020    GFRAA >60 08/18/2020    LABGLOM >60 08/18/2020    GLUCOSE 98 08/18/2020    GLUCOSE 101 09/21/2011    PROT 6.4 08/14/2020    LABALBU 3.8 08/14/2020    LABALBU 4.8 09/21/2011    CALCIUM 8.8 08/18/2020    BILITOT 0.9 08/14/2020    ALKPHOS 137 08/14/2020    AST 51 08/14/2020    ALT 73 08/14/2020     Magnesium:    Lab Results   Component Value Date    MG 1.8 08/18/2020     Phosphorus:    Lab Results   Component Value Date    PHOS 2.8 08/14/2020     PT/INR:    Lab Results   Component Value Date    PROTIME 12.3 08/22/2015    INR 1.0 08/22/2015     Troponin:    Lab Results   Component Value Date    TROPONINI 0.05 08/14/2020     U/A:    Lab Results   Component Value Date    COLORU Yellow 08/13/2020    PROTEINU TRACE 08/13/2020    PHUR 5.5 08/13/2020    WBCUA 1-3 08/13/2020    RBCUA >20 08/13/2020    BACTERIA RARE 08/13/2020    CLARITYU Clear 08/13/2020    SPECGRAV 1.025 08/13/2020    LEUKOCYTESUR Negative 08/13/2020    UROBILINOGEN 1.0 08/13/2020    BILIRUBINUR Negative 08/13/2020    BLOODU LARGE 08/13/2020    GLUCOSEU Negative 08/13/2020     ABG:  No results found for: PH, PCO2, PO2, HCO3, BE, THGB, TCO2, O2SAT  HgBA1c:    Lab Results   Component Value Date    LABA1C 6.0 08/15/2020     FLP:    Lab Results   Component Value Date    TRIG 64 08/14/2020    HDL 55 08/14/2020    LDLCALC 114 08/14/2020    LABVLDL 13 08/14/2020     TSH:    Lab Results   Component Value Date    TSH 2.190 12/06/2017     IRON:  No results found for: IRON  LIPASE:  No results found for: LIPASE    ASSESSMENT AND PLAN:      Patient Active Problem List    Diagnosis Date Noted    Congestive heart failure of unknown etiology ([facility] [address] 08/13/2020    Unilateral inguinal hernia 12/26/2017    Carotid artery stenosis 08/21/2015    Anxiety 02/26/2015    Gout 01/22/2015    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 Moderate right pleural effusion and small left pleural effusion  2. Acute systolic CHF-EF 37-43%  3. Frequent PVCs, 10 beat nonsustained V. Tach  4. Hypertension-asymptomatic hypotension  5. Mild elevation of troponin  6. Hyperlipidemia  7. Anxiety/depression  8. Probable left ventricular apical thrombus  9. Possible acute ischemic cardiomyopathy    Plan:  Transfer to [facility] for heart catheterization  Current medications continued with heparin drip being stopped. Patient will be discharged from [facility] to home and medications adjusted per cardiology.   Patient follow-up primary care physician 1 week after discharge    Jerzy Charles D.O.  8/18/2020  11:40 AM

## 2020-08-18 NOTE — PROCEDURES
[address]                  [address] [place],  [facility]                            CARDIAC CATHETERIZATION    PATIENT NAME: Claudell Moris                     :        1950  MED REC NO:   [id number]                            ROOM:  ACCOUNT NO:   [de-identified]                           ADMIT DATE: 2020  PROVIDER:     Miguel Blount MD    DATE OF PROCEDURE:  2020    LEFT HEART CATHETERIZATION    REFERRING PROVIDER:  Lottie Wren MD    INDICATION:  Ischemic cardiomyopathy with new acute systolic and  diastolic CHF. PROCEDURE NOTE:  The patient was transferred from [facility] to undergo left heart catheterization. Under sterile condition  and under local anesthetic and using conscious sedation, a 6-Sammarinese  Terumo slender sheath was inserted into the right radial artery. The  patient received 3000 units of intravenous heparin. He also received  200 mcg of intraarterial nitroglycerin via the right radial sheath. Then I could not advance the wire into the radial artery due to spasm. Then the patient was given 1.25 mg of diluted verapamil via the right  radial sheath. Then a 5-Sammarinese TIG diagnostic catheter was advanced to  the ascending aorta without difficulty. The right coronary artery was  engaged and a coronary angiogram was done. Then the left main coronary  artery was engaged and a coronary angiogram was done. No left  ventriculogram was done due to probable LV clot noted by echocardiogram.  The patient received an extra 2000 units of intravenous heparin. He  also received two doses of intravenous Davis-Synephrine at 100 mcg each  due to low blood pressure. The patient tolerated the procedure well and  no complications were noted. No significant bleeding occurred during  the procedure. FINDINGS:  HEMODYNAMIC RESULTS:  Aortic pressure 74/45 mmHg.     CORONARY ANATOMY:  Left main:  It is a long and large artery with 80% distal stenosis. LAD:  It is a large artery, reaching the apex and giving rise to two  diagonal branches and very large septal . The LAD was  calcified in its proximal to mid segment. There was an 80% ostial LAD  stenosis, 90% discrete mid LAD stenosis at the takeoff of the second  diagonal and a large septal , and a 60% to 70% mid discrete  stenosis. The first diagonal was very small. The second diagonal was  large and trifurcating. There was 80% to 90% ostial stenosis and 80%  discrete proximal stenosis in second diagonal, and the lower branch was  100% occluded. The LAD gives collaterals to distal RCA branches. Ramus branch: It is large with 40% to 50% diffuse mid stenosis. Left circumflex: It is 100% occluded proximally. There was late  filling of the obtuse marginal branch. RCA:  It is a large dominant artery giving rise to a conus branch, an SA  cecil or AV cecil branch, a large RV marginal branch. The RCA is 100%  occluded at the mid segment with KARLA-0 flow distally. There was 80% to  90% proximal stenosis of the fair size RV marginal of the proximal  segment of the RV marginal.    IMPRESSION:  1. Severe triple-vessel CAD. 2.  Ischemic cardiomyopathy with probable apical clot. RECOMMENDATION:  CT consult for possible CABG. PROCEDURE START TIME:  13:47 p.m. PROCEDURE END TIME:  14:07 p.m. FLUOROSCOPY TIME:  1.7 minutes. CONTRAST VOLUME:  50 mL of Optiray. CONSCIOUS SEDATION:  50 mcg of intravenous fentanyl.         Annette Breen MD    D: 08/18/2020 14:36:00       T: 08/18/2020 14:41:13     [id number]  Job#: [id number]     Doc#: [id number]    CC:

## 2020-08-19 PROBLEM — I51.3 APICAL MURAL THROMBUS: Status: ACTIVE | Noted: 2020-08-19

## 2020-08-19 LAB
ANION GAP SERPL CALCULATED.3IONS-SCNC: 13 MMOL/L (ref 7–16)
APTT: 100.1 SEC (ref 24.5–35.1)
APTT: 42.5 SEC (ref 24.5–35.1)
APTT: 52.2 SEC (ref 24.5–35.1)
BUN BLDV-MCNC: 19 MG/DL (ref 8–23)
CALCIUM SERPL-MCNC: 9.3 MG/DL (ref 8.6–10.2)
CHLORIDE BLD-SCNC: 99 MMOL/L (ref 98–107)
CO2: 28 MMOL/L (ref 22–29)
CREAT SERPL-MCNC: 1 MG/DL (ref 0.7–1.2)
GFR AFRICAN AMERICAN: >60
GFR NON-AFRICAN AMERICAN: >60 ML/MIN/1.73
GLUCOSE BLD-MCNC: 135 MG/DL (ref 74–99)
HCT VFR BLD CALC: 36.3 % (ref 37–54)
HEMOGLOBIN: 11.9 G/DL (ref 12.5–16.5)
MAGNESIUM: 1.7 MG/DL (ref 1.6–2.6)
MCH RBC QN AUTO: 31.5 PG (ref 26–35)
MCHC RBC AUTO-ENTMCNC: 32.8 % (ref 32–34.5)
MCV RBC AUTO: 96 FL (ref 80–99.9)
PDW BLD-RTO: 14.9 FL (ref 11.5–15)
PLATELET # BLD: 156 E9/L (ref 130–450)
PMV BLD AUTO: 11.3 FL (ref 7–12)
POTASSIUM SERPL-SCNC: 4.5 MMOL/L (ref 3.5–5)
RBC # BLD: 3.78 E12/L (ref 3.8–5.8)
SODIUM BLD-SCNC: 140 MMOL/L (ref 132–146)
WBC # BLD: 6.9 E9/L (ref 4.5–11.5)

## 2020-08-19 PROCEDURE — 96365 THER/PROPH/DIAG IV INF INIT: CPT

## 2020-08-19 PROCEDURE — 80048 BASIC METABOLIC PNL TOTAL CA: CPT

## 2020-08-19 PROCEDURE — 36415 COLL VENOUS BLD VENIPUNCTURE: CPT

## 2020-08-19 PROCEDURE — 2580000003 HC RX 258: Performed by: INTERNAL MEDICINE

## 2020-08-19 PROCEDURE — 83735 ASSAY OF MAGNESIUM: CPT

## 2020-08-19 PROCEDURE — 85730 THROMBOPLASTIN TIME PARTIAL: CPT

## 2020-08-19 PROCEDURE — 6370000000 HC RX 637 (ALT 250 FOR IP): Performed by: INTERNAL MEDICINE

## 2020-08-19 PROCEDURE — 85027 COMPLETE CBC AUTOMATED: CPT

## 2020-08-19 PROCEDURE — APPSS60 APP SPLIT SHARED TIME 46-60 MINUTES: Performed by: NURSE PRACTITIONER

## 2020-08-19 PROCEDURE — 96366 THER/PROPH/DIAG IV INF ADDON: CPT

## 2020-08-19 PROCEDURE — 6360000002 HC RX W HCPCS: Performed by: INTERNAL MEDICINE

## 2020-08-19 PROCEDURE — G0378 HOSPITAL OBSERVATION PER HR: HCPCS

## 2020-08-19 RX ORDER — POTASSIUM CHLORIDE 750 MG/1
TABLET, EXTENDED RELEASE ORAL
Status: DISPENSED
Start: 2020-08-19 | End: 2020-08-20

## 2020-08-19 RX ADMIN — HEPARIN SODIUM AND DEXTROSE 6.97 UNITS/KG/HR: 10000; 5 INJECTION INTRAVENOUS at 18:55

## 2020-08-19 RX ADMIN — METOPROLOL SUCCINATE 25 MG: 25 TABLET, EXTENDED RELEASE ORAL at 12:33

## 2020-08-19 RX ADMIN — ASPIRIN 81 MG CHEWABLE TABLET 81 MG: 81 TABLET CHEWABLE at 12:33

## 2020-08-19 RX ADMIN — CITALOPRAM 10 MG: 20 TABLET, FILM COATED ORAL at 12:34

## 2020-08-19 RX ADMIN — ALLOPURINOL 300 MG: 300 TABLET ORAL at 12:34

## 2020-08-19 RX ADMIN — ATORVASTATIN CALCIUM 40 MG: 40 TABLET, FILM COATED ORAL at 22:38

## 2020-08-19 RX ADMIN — SODIUM CHLORIDE, PRESERVATIVE FREE 10 ML: 5 INJECTION INTRAVENOUS at 22:39

## 2020-08-19 RX ADMIN — VITAMIN D 1000 UNITS: 25 TAB ORAL at 12:34

## 2020-08-19 RX ADMIN — SODIUM CHLORIDE, PRESERVATIVE FREE 10 ML: 5 INJECTION INTRAVENOUS at 12:36

## 2020-08-19 ASSESSMENT — PAIN SCALES - GENERAL
PAINLEVEL_OUTOF10: 0
PAINLEVEL_OUTOF10: 0

## 2020-08-19 NOTE — PLAN OF CARE
Problem: Falls - Risk of:  Goal: Will remain free from falls  Description: Will remain free from falls  Outcome: Met This Shift     Problem: Infection - Surgical Site:  Goal: Will show no infection signs and symptoms  Description: Will show no infection signs and symptoms  Outcome: Met This Shift

## 2020-08-19 NOTE — PLAN OF CARE
8/19/2020- Pt admitted with ICM, new HFrEF, CTS to see pt.   -Orders: cardiac, 2 gram sodium diet, daily weights, I/O  -HF education points, HF discharge instructions on chart  -placed recent EF on chart  - will see pt for HF teaching    ANSON White RN 2:44 PM

## 2020-08-19 NOTE — PROGRESS NOTES
Inpatient Cardiology Progress note     PATIENT IS BEING FOLLOWED FOR: Ischemic cardiomyopathy with new acute systolic and diastolic CHF. Jean Carlos Jeffers is a 78 y. o. male seen in initial consultation by Dr Amanda Polanco: Denies CP or SOB. Complains of difficulty urinating  OBJECTIVE: No apparent distress     ROS:  Consist: Denies fevers, chills or night sweats  Heart: Denies chest pain, palpitations, lightheadedness, dizziness or syncope  Lungs: Denies SOB, cough, wheezing, orthopnea or PND  GI: Denies abdominal pain, vomiting or diarrhea    PHYSICAL EXAM:   /80   Pulse 68   Temp 97.8 °F (36.6 °C) (Temporal)   Resp 18   Ht 5' 9\" (1.753 m)   Wt 135 lb 9.6 oz (61.5 kg)   SpO2 97%   BMI 20.02 kg/m²    B/P Range last 24 hours: Systolic (24NYD), MJR:101 , Min:95 , EN    Diastolic (62ABQ), HFU:94, Min:54, Max:80    CONST: Well developed,  male who appears stated age. Awake, alert and cooperative. No apparent distress  HEENT:   Head- Normocephalic, atraumatic   Eyes- Conjunctivae pink, anicteric  Throat- Oral mucosa pink and moist  Neck-  No stridor, trachea midline, no jugular venous distention. No carotid bruit  CHEST: Chest symmetrical and non-tender to palpation. No accessory muscle use or intercostal retractions  RESPIRATORY:  Lung sounds - clear throughout fields   CARDIOVASCULAR:     Heart Ausculation- Regular rate and rhythm, no murmur heard. PV: 2-3+ pitting BLE edema. No varicosities. Pedal pulses palpable, no clubbing or cyanosis   ABDOMEN: Soft, non-tender to light palpation. Bowel sounds present. No palpable masses; no abdominal bruit  MS: Good muscle strength and tone. No atrophy or abnormal movements. : Deferred  SKIN: Pale, warm and dry no statis dermatitis or ulcers   NEURO / PSYCH: Oriented to person, place and time. Speech clear and appropriate. Follows all commands.  Pleasant affect       Intake/Output Summary (Last 24 hours) at 2020 3337  Last data filed at 8/19/2020 0615  Gross per 24 hour   Intake 363 ml   Output 800 ml   Net -437 ml       Weight:   Wt Readings from Last 3 Encounters:   08/18/20 135 lb 9.6 oz (61.5 kg)   08/18/20 148 lb 11.2 oz (67.4 kg)   01/14/20 160 lb 12.8 oz (72.9 kg)     Current Inpatient Medications:   sodium chloride flush  10 mL Intravenous 2 times per day    allopurinol  300 mg Oral Daily    atorvastatin  40 mg Oral Nightly    citalopram  10 mg Oral Daily    Vitamin D  1,000 Units Oral Daily    aspirin  81 mg Oral Daily    metoprolol succinate  25 mg Oral Daily    potassium chloride  20 mEq Oral TID WC       IV Infusions (if any):   heparin (porcine) 9 Units/kg/hr (08/19/20 0311)       DIAGNOSTIC/ LABORATORY DATA:  Labs:   CBC:   Recent Labs     08/18/20  0952 08/18/20  2129   WBC 7.8 9.6   HGB 13.0 12.7   HCT 40.2 38.4    182     BMP:   Recent Labs     08/17/20  0804 08/18/20  0952    139   K 3.8 4.4   CO2 25 28   BUN 21 20   CREATININE 1.0 1.0   LABGLOM >60 >60   CALCIUM 8.4* 8.8     Mag:   Recent Labs     08/18/20  0952   MG 1.8     HgA1c:   Lab Results   Component Value Date    LABA1C 6.0 (H) 08/15/2020     APTT:  Recent Labs     08/17/20  1136 08/18/20 2129   APTT 87.8* >240.0*     FASTING LIPID PANEL:  Lab Results   Component Value Date    CHOL 182 08/14/2020    HDL 55 08/14/2020    LDLCALC 114 08/14/2020    TRIG 64 08/14/2020       CXR 8/13/2020: Moderate right pleural effusion. Small left pleural effusion.  Retrocardiac left lower lobe opacity that   could represent atelectasis or pneumonia    BLE Dopplers 8/13/2020: No evidence of DVT in either lower extremity    Telemetry: SR    TTE 8/13/2020 Dr Santos Rodriguez: Micro-bubble contrast injected to enhance left ventricular visualization. Left ventricle is mildly dilated, LVEDD 6.0 cm. LV systolic function is severely reduced. Ejection fraction is visually estimated at 15-20%. Grade II diastolic dysfunction.  There is severe global hypokinesis with wall motion abnormalities as described above. Mild left ventricular concentric hypertrophy noted. There is a filling defect noted in the LV apex, seen only on subcostal windows, suggestive of LV apical thrombus. Moderately dilated right ventricle. Right ventricle global systolic function is low normal. The left atrium is severely dilated. Mildly enlarged right atrium. Moderate-severe mitral regurgitation directed anteriorly and centrally. Mild aortic valve regurgitation. Moderate tricuspid regurgitation. Estimated PA pressure 48/22 mmHg. Moderate pulmonic regurgitation. Left pleural effusion. [facility] 8/18/2020 Dr Yael Roa: Left main:  It is a long and large artery with 80% distal stenosis. LAD:  It is a large artery, reaching the apex and giving rise to two diagonal branches and very large septal . The LAD was calcified in its proximal to mid segment. There was an 80% ostial LAD stenosis, 90% discrete mid LAD stenosis at the takeoff of the second diagonal and a large septal , and a 60% to 70% mid discrete stenosis. The first diagonal was very small. The second diagonal was large and trifurcating. There was 80% to 90% ostial stenosis and 80%  discrete proximal stenosis in second diagonal, and the lower branch was 100% occluded. The LAD gives collaterals to distal RCA branches. Ramus branch: It is large with 40% to 50% diffuse mid stenosis. Left circumflex: It is 100% occluded proximally. There was late filling of the obtuse marginal branch. RCA:  It is a large dominant artery giving rise to a conus branch, an SA cecil or AV cecil branch, a large RV marginal branch. The RCA is 100% occluded at the mid segment with KARLA-0 flow distally. There was 80% to 90% proximal stenosis of the fair size RV marginal of the proximal segment of the RV marginal.    ASSESSMENT:   1. Acute decompensated heart failure/HFrEF  2. Severe 3V CAD on [facility] 8/18/2020  3.  New onset ischemic cardiomyopathy, EF 15 to 20% with wall motion abnormalities, suspicious for ischemic etiology. 4. Nonsustained ventricular tachycardia  5. Bilateral pleural effusions right greater than left  6. BP soft  7. Valvular heart disease: Moderate to severe MR, moderate TR, mild aortic regurgitation, moderate pulmonic regurgitation  8. Probable left ventricular apical thrombus  9. PAD: History of left carotid stent  10. Comorbid disease: Hypertension, anxiety/depression, hyperlipidemia, gout        PLAN:  1. Continue heparin gtt and BB therapy  2. Check BMP today: consider IV Lasix  3. Await CTS recommendations  4. Soft BP unable to adjust or add any GDMT  5. Long discussion had with patient regarding plan of care and cath results. Questions answered    Discussed with Dr Letha Iniguez  Electronically signed by Debra Veras.  MARTÍNEZ Cerna on 8/19/2020 at 9:28 AM

## 2020-08-19 NOTE — H&P
[address] [facility]  History and Physical      CHIEF COMPLAINT:   ischemic cardiomyopathy      HISTORY OF PRESENT ILLNESS:      The patient is a 78 y.o. male patient of Dr. Lakeshia Reno who presents with cardiomyopathy. Patient was initially admitted to [facility] with shortness of breath and swelling for 1 month. He was admitted and treated for CHF. He was found to have significantly reduced ejection fraction 15 to 20%. Additionally concern for LV thrombus necessitated institution of IV heparin. Cardiology was consulted and recommended transfer to [address] [place] [address] for left heart catheterization. Patient underwent left heart catheterization at [facility] terms of multivessel CAD. Patient is admitted  For further evaluation treatment along with CT surgery consult. Presently feeling much better over initial admission. No SOB CP, swelling improved.   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,     Gout     History of cardiovascular stress test 07/2015    Lexiscan stress test    Hypertension     Kidney stone     stent/    Osteoarthritis        Past Surgical History:    Past Surgical History:   Procedure Laterality Date    LITHOTRIPSY      MANDIBLE SURGERY      OTHER SURGICAL HISTORY Left 5/8/2014    EXCISION OF OLECRANON BURSA    OTHER SURGICAL HISTORY  02/08/2018    Laparoscopic transabdominal right inguinal hernia repair with mesk and Excision of Lipoma of the Cord    TONSILLECTOMY         Medications Prior to Admission:    Medications Prior to Admission: aspirin 81 MG chewable tablet, Take 1 tablet by mouth daily  allopurinol (ZYLOPRIM) 300 MG tablet, TAKE ONE TABLET BY MOUTH EVERY DAY  metoprolol succinate (TOPROL XL) 25 MG extended release tablet, Take 1 tablet by mouth daily  citalopram (CELEXA) 10 MG tablet, Take 10 mg by mouth daily  LORazepam (ATIVAN) 0.5 MG tablet, Take 0.5 mg by mouth every 6 hours as needed for Anxiety. ibuprofen (ADVIL;MOTRIN) 800 MG tablet, Take 1 tablet by mouth every 6 hours as needed for Pain    Allergies:    Patient has no known allergies. Social History:    reports that he quit smoking about 5 years ago. His smoking use included cigarettes. He has a 12.50 pack-year smoking history. He has never used smokeless tobacco. He reports current alcohol use of about 1.0 standard drinks of alcohol per week. He reports that he does not use drugs. Family History:   family history includes Coronary Art Dis in his father; Heart Disease in his mother; Stroke in his mother. REVIEW OF SYSTEMS:  As above in the HPI, otherwise negative    PHYSICAL EXAM:    Vitals:  /80   Pulse 68   Temp 97.8 °F (36.6 °C) (Temporal)   Resp 18   Ht 5' 9\" (1.753 m)   Wt 135 lb 9.6 oz (61.5 kg)   SpO2 97%   BMI 20.02 kg/m²     General:  Awake, alert, oriented X 3. Well developed, well nourished, well groomed. No apparent distress. HEENT:  Normocephalic, atraumatic. Pupils equal, round, reactive to light. No scleral icterus. No conjunctival injection. Normal lips, teeth, and gums. No nasal discharge. Neck:  Supple  Heart:  RRR, no murmurs, gallops, rubs  Lungs:  CTA bilaterally, bilat symmetrical expansion, no wheeze, rales, or rhonchi  Abdomen:   Bowel sounds present, soft, nontender, no masses, no organomegaly, no peritoneal signs  Extremities:  No clubbing, cyanosis, or edema  Skin:  Warm and dry, no open lesions or rash  Neuro:  Cranial nerves 2-12 intact, no focal deficits  Breast: deferred  Rectal: deferred  Genitalia:  deferred    LABS:    CBC with Differential:    Lab Results   Component Value Date    WBC 6.9 08/19/2020    RBC 3.78 08/19/2020    HGB 11.9 08/19/2020    HCT 36.3 08/19/2020     08/19/2020    MCV 96.0 08/19/2020    MCH 31.5 08/19/2020    MCHC 32.8 08/19/2020    RDW 14.9 08/19/2020    SEGSPCT 60 09/11/2013    LYMPHOPCT 16.3 08/14/2020    MONOPCT 7.1 08/14/2020 BASOPCT 0.4 08/14/2020    MONOSABS 0.55 08/14/2020    LYMPHSABS 1.26 08/14/2020    EOSABS 0.09 08/14/2020    BASOSABS 0.03 08/14/2020     CMP:    Lab Results   Component Value Date     08/19/2020    K 4.5 08/19/2020    K 4.4 08/13/2020    CL 99 08/19/2020    CO2 28 08/19/2020    BUN 19 08/19/2020    CREATININE 1.0 08/19/2020    GFRAA >60 08/19/2020    LABGLOM >60 08/19/2020    GLUCOSE 135 08/19/2020    GLUCOSE 101 09/21/2011    PROT 6.4 08/14/2020    LABALBU 3.8 08/14/2020    LABALBU 4.8 09/21/2011    CALCIUM 9.3 08/19/2020    BILITOT 0.9 08/14/2020    ALKPHOS 137 08/14/2020    AST 51 08/14/2020    ALT 73 08/14/2020     Magnesium:    Lab Results   Component Value Date    MG 1.7 08/19/2020     Phosphorus:    Lab Results   Component Value Date    PHOS 2.8 08/14/2020     PT/INR:    Lab Results   Component Value Date    PROTIME 12.3 08/22/2015    INR 1.0 08/22/2015     Last 3 Troponin:    Lab Results   Component Value Date    TROPONINI 0.05 08/14/2020    TROPONINI 0.05 08/13/2020    TROPONINI <0.01 08/22/2015     U/A:    Lab Results   Component Value Date    COLORU Yellow 08/13/2020    PROTEINU TRACE 08/13/2020    PHUR 5.5 08/13/2020    WBCUA 1-3 08/13/2020    RBCUA >20 08/13/2020    BACTERIA RARE 08/13/2020    CLARITYU Clear 08/13/2020    SPECGRAV 1.025 08/13/2020    LEUKOCYTESUR Negative 08/13/2020    UROBILINOGEN 1.0 08/13/2020    BILIRUBINUR Negative 08/13/2020    BLOODU LARGE 08/13/2020    GLUCOSEU Negative 08/13/2020     ABG:  No results found for: PH, PCO2, PO2, HCO3, BE, THGB, TCO2, O2SAT  HgBA1c:    Lab Results   Component Value Date    LABA1C 6.0 08/15/2020     FLP:    Lab Results   Component Value Date    TRIG 64 08/14/2020    HDL 55 08/14/2020    LDLCALC 114 08/14/2020    LABVLDL 13 08/14/2020     TSH:    Lab Results   Component Value Date    TSH 2.190 12/06/2017       No orders to display       ASSESSMENT:      Principal Problem:    CAD in native artery  Active Problems:    HTN (hypertension) Gout    Ischemic cardiomyopathy    Acute systolic CHF (congestive heart failure) (HCC)    Apical mural thrombus  Resolved Problems:    * No resolved hospital problems. *      PLAN:    39-year-old male history of hypertension and gout admitted with ischemic cardiomyopathy, acute systolic heart failure, suspected apical mural thrombus transferred from [facility] for left heart catheterization 8/18 revealing multivessel CAD.     Heparin drip  ASA  High intensity statin  GDMT--metoprolol succinate with hold parameters, add lisinopril as pressure permits  Cardiology consult appreciated  CT surgery consult  Medications for other co morbidities cont as appropriate w dosage adjustments as necessary    Electronically signed by Svetlana Kessler MD on 8/19/2020 at 11:42 AM

## 2020-08-19 NOTE — CARE COORDINATION
Transition of care: IV Heparin cont. Met with pt in room. Pt lives alone in a 2 story home. States his partner, Delmy Chou, of 40 yrs passed away on May 28th of this year. Pt tearful when talking about this. He is originally from [address] [place] [address] and has no family of his own locally. States he is close with Ousmane's family- Ousmane's sister, Yvonne Cintron, and Ousmane's niece, Garima Desouza. His bedroom and bathroom are on the 2nd floor. Pt has 1/2 bath on the 1st floor. Independent wiht ADLs. Yvonne Cintron drives for him. DME- cane which pt uses outside the house. Pt stated he has anxiety and takes ativan. He has never attended grief counseling since the passing of his partner. PCP is Dr. Cari Estrada and pharmacy is [facility] in [place].  Sw/cm will follow

## 2020-08-20 LAB
ANION GAP SERPL CALCULATED.3IONS-SCNC: 12 MMOL/L (ref 7–16)
APTT: 38.4 SEC (ref 24.5–35.1)
APTT: 41 SEC (ref 24.5–35.1)
APTT: 51.4 SEC (ref 24.5–35.1)
BUN BLDV-MCNC: 17 MG/DL (ref 8–23)
CALCIUM SERPL-MCNC: 9.2 MG/DL (ref 8.6–10.2)
CHLORIDE BLD-SCNC: 100 MMOL/L (ref 98–107)
CO2: 26 MMOL/L (ref 22–29)
CREAT SERPL-MCNC: 1 MG/DL (ref 0.7–1.2)
GFR AFRICAN AMERICAN: >60
GFR NON-AFRICAN AMERICAN: >60 ML/MIN/1.73
GLUCOSE BLD-MCNC: 102 MG/DL (ref 74–99)
HCT VFR BLD CALC: 34 % (ref 37–54)
HEMOGLOBIN: 11.1 G/DL (ref 12.5–16.5)
MCH RBC QN AUTO: 31.3 PG (ref 26–35)
MCHC RBC AUTO-ENTMCNC: 32.6 % (ref 32–34.5)
MCV RBC AUTO: 95.8 FL (ref 80–99.9)
PDW BLD-RTO: 14.8 FL (ref 11.5–15)
PLATELET # BLD: 146 E9/L (ref 130–450)
PMV BLD AUTO: 11.4 FL (ref 7–12)
POTASSIUM REFLEX MAGNESIUM: 4.4 MMOL/L (ref 3.5–5)
RBC # BLD: 3.55 E12/L (ref 3.8–5.8)
SODIUM BLD-SCNC: 138 MMOL/L (ref 132–146)
WBC # BLD: 7.2 E9/L (ref 4.5–11.5)

## 2020-08-20 PROCEDURE — 6360000002 HC RX W HCPCS: Performed by: INTERNAL MEDICINE

## 2020-08-20 PROCEDURE — APPSS30 APP SPLIT SHARED TIME 16-30 MINUTES: Performed by: NURSE PRACTITIONER

## 2020-08-20 PROCEDURE — 6360000002 HC RX W HCPCS: Performed by: NURSE PRACTITIONER

## 2020-08-20 PROCEDURE — 99222 1ST HOSP IP/OBS MODERATE 55: CPT | Performed by: THORACIC SURGERY (CARDIOTHORACIC VASCULAR SURGERY)

## 2020-08-20 PROCEDURE — 2580000003 HC RX 258: Performed by: INTERNAL MEDICINE

## 2020-08-20 PROCEDURE — 6370000000 HC RX 637 (ALT 250 FOR IP): Performed by: INTERNAL MEDICINE

## 2020-08-20 PROCEDURE — 96366 THER/PROPH/DIAG IV INF ADDON: CPT

## 2020-08-20 PROCEDURE — 2140000000 HC CCU INTERMEDIATE R&B

## 2020-08-20 PROCEDURE — 80048 BASIC METABOLIC PNL TOTAL CA: CPT

## 2020-08-20 PROCEDURE — 85730 THROMBOPLASTIN TIME PARTIAL: CPT

## 2020-08-20 PROCEDURE — 85027 COMPLETE CBC AUTOMATED: CPT

## 2020-08-20 PROCEDURE — 36415 COLL VENOUS BLD VENIPUNCTURE: CPT

## 2020-08-20 RX ORDER — WARFARIN SODIUM 2.5 MG/1
2.5 TABLET ORAL DAILY
Status: DISCONTINUED | OUTPATIENT
Start: 2020-08-20 | End: 2020-08-21 | Stop reason: HOSPADM

## 2020-08-20 RX ORDER — METOPROLOL SUCCINATE 25 MG/1
25 TABLET, EXTENDED RELEASE ORAL DAILY
Qty: 30 TABLET | Refills: 3 | Status: SHIPPED | OUTPATIENT
Start: 2020-08-21

## 2020-08-20 RX ORDER — ATORVASTATIN CALCIUM 40 MG/1
40 TABLET, FILM COATED ORAL NIGHTLY
Qty: 30 TABLET | Refills: 3 | Status: SHIPPED | OUTPATIENT
Start: 2020-08-20

## 2020-08-20 RX ORDER — ASPIRIN 81 MG/1
81 TABLET, CHEWABLE ORAL DAILY
Qty: 30 TABLET | Refills: 3 | Status: ON HOLD | OUTPATIENT
Start: 2020-08-20 | End: 2021-01-01

## 2020-08-20 RX ORDER — FUROSEMIDE 10 MG/ML
20 INJECTION INTRAMUSCULAR; INTRAVENOUS ONCE
Status: COMPLETED | OUTPATIENT
Start: 2020-08-20 | End: 2020-08-20

## 2020-08-20 RX ORDER — WARFARIN SODIUM 5 MG/1
5 TABLET ORAL DAILY
Qty: 30 TABLET | Refills: 3 | Status: SHIPPED | OUTPATIENT
Start: 2020-08-20 | End: 2021-01-01 | Stop reason: HOSPADM

## 2020-08-20 RX ADMIN — ASPIRIN 81 MG CHEWABLE TABLET 81 MG: 81 TABLET CHEWABLE at 09:08

## 2020-08-20 RX ADMIN — SODIUM CHLORIDE, PRESERVATIVE FREE 10 ML: 5 INJECTION INTRAVENOUS at 09:08

## 2020-08-20 RX ADMIN — METOPROLOL SUCCINATE 25 MG: 25 TABLET, EXTENDED RELEASE ORAL at 09:08

## 2020-08-20 RX ADMIN — ENOXAPARIN SODIUM 60 MG: 60 INJECTION SUBCUTANEOUS at 20:49

## 2020-08-20 RX ADMIN — ALLOPURINOL 300 MG: 300 TABLET ORAL at 09:08

## 2020-08-20 RX ADMIN — ATORVASTATIN CALCIUM 40 MG: 40 TABLET, FILM COATED ORAL at 20:49

## 2020-08-20 RX ADMIN — SODIUM CHLORIDE, PRESERVATIVE FREE 10 ML: 5 INJECTION INTRAVENOUS at 17:07

## 2020-08-20 RX ADMIN — HEPARIN SODIUM 2070 UNITS: 1000 INJECTION INTRAVENOUS; SUBCUTANEOUS at 09:08

## 2020-08-20 RX ADMIN — FUROSEMIDE 20 MG: 10 INJECTION, SOLUTION INTRAVENOUS at 17:07

## 2020-08-20 RX ADMIN — WARFARIN SODIUM 2.5 MG: 2.5 TABLET ORAL at 17:07

## 2020-08-20 RX ADMIN — SODIUM CHLORIDE, PRESERVATIVE FREE 10 ML: 5 INJECTION INTRAVENOUS at 20:50

## 2020-08-20 RX ADMIN — CITALOPRAM 10 MG: 20 TABLET, FILM COATED ORAL at 09:08

## 2020-08-20 RX ADMIN — VITAMIN D 1000 UNITS: 25 TAB ORAL at 09:08

## 2020-08-20 ASSESSMENT — PAIN SCALES - GENERAL
PAINLEVEL_OUTOF10: 0

## 2020-08-20 NOTE — PROGRESS NOTES
Inpatient Cardiology Progress Note     PATIENT IS BEING FOLLOWED FOR: Ischemic cardiomyopathy with new acute systolic and diastolic CHF. Isrrael Culver is a 78 y. o. male seen in initial consultation by Dr Flores Moose: Denies CP or SOB. Complains of difficulty urinating  OBJECTIVE: No apparent distress     ROS:  Consist: Denies fevers, chills or night sweats  Heart: Denies chest pain, palpitations, lightheadedness, dizziness or syncope  Lungs: Denies SOB, cough, wheezing, orthopnea or PND  GI: Denies abdominal pain, vomiting or diarrhea    PHYSICAL EXAM:   BP (!) 91/54   Pulse 70   Temp 98.7 °F (37.1 °C) (Temporal)   Resp 18   Ht 5' 9\" (1.753 m)   Wt 136 lb (61.7 kg)   SpO2 98%   BMI 20.08 kg/m²    B/P Range last 24 hours: Systolic (38DDC), ZDJ:31 , Min:88 , UOI:11    Diastolic (70UBQ), MCFP:26, Min:51, Max:56    CONST: Well developed,  male who appears stated age. Awake, alert and cooperative. No apparent distress  HEENT:   Head- Normocephalic, atraumatic   Eyes- Conjunctivae pink, anicteric  Throat- Oral mucosa pink and moist  Neck-  No stridor, trachea midline, no jugular venous distention. No carotid bruit  CHEST: Chest symmetrical and non-tender to palpation. No accessory muscle use or intercostal retractions  RESPIRATORY:  Lung sounds - clear throughout fields   CARDIOVASCULAR:     Heart Ausculation- Regular rate and rhythm, no murmur heard. PV: 2-3+ pitting BLE edema. No varicosities. Pedal pulses palpable, no clubbing or cyanosis   ABDOMEN: Soft, non-tender to light palpation. Bowel sounds present. No palpable masses; no abdominal bruit  MS: Good muscle strength and tone. No atrophy or abnormal movements. : Deferred  SKIN: Pale, warm and dry no statis dermatitis or ulcers   NEURO / PSYCH: Oriented to person, place and time. Speech clear and appropriate. Follows all commands.  Pleasant affect       Intake/Output Summary (Last 24 hours) at 8/20/2020 1513  Last data filed at 8/20/2020 6756  Gross per 24 hour   Intake 406 ml   Output 850 ml   Net -444 ml       Weight:   Wt Readings from Last 3 Encounters:   08/20/20 136 lb (61.7 kg)   08/18/20 148 lb 11.2 oz (67.4 kg)   01/14/20 160 lb 12.8 oz (72.9 kg)     Current Inpatient Medications:   sodium chloride flush  10 mL Intravenous 2 times per day    allopurinol  300 mg Oral Daily    atorvastatin  40 mg Oral Nightly    citalopram  10 mg Oral Daily    Vitamin D  1,000 Units Oral Daily    aspirin  81 mg Oral Daily    metoprolol succinate  25 mg Oral Daily       IV Infusions (if any):   heparin (porcine) 9 Units/kg/hr (08/20/20 0910)       DIAGNOSTIC/ LABORATORY DATA:  Labs:   CBC:   Recent Labs     08/19/20  0938 08/20/20  0700   WBC 6.9 7.2   HGB 11.9* 11.1*   HCT 36.3* 34.0*    146     BMP:   Recent Labs     08/19/20  0938 08/20/20  0700    138   K 4.5 4.4   CO2 28 26   BUN 19 17   CREATININE 1.0 1.0   LABGLOM >60 >60   CALCIUM 9.3 9.2     Mag:   Recent Labs     08/18/20  0952 08/19/20  0938   MG 1.8 1.7     HgA1c:   Lab Results   Component Value Date    LABA1C 6.0 (H) 08/15/2020     APTT:  Recent Labs     08/20/20  0700 08/20/20  0855   APTT 38.4* 41.0*     FASTING LIPID PANEL:  Lab Results   Component Value Date    CHOL 182 08/14/2020    HDL 55 08/14/2020    LDLCALC 114 08/14/2020    TRIG 64 08/14/2020       CXR 8/13/2020: Moderate right pleural effusion. Small left pleural effusion.  Retrocardiac left lower lobe opacity that   could represent atelectasis or pneumonia    BLE Dopplers 8/13/2020: No evidence of DVT in either lower extremity    Telemetry: SR    TTE 8/13/2020 Dr Agarwal Flatten: Micro-bubble contrast injected to enhance left ventricular visualization. Left ventricle is mildly dilated, LVEDD 6.0 cm. LV systolic function is severely reduced. Ejection fraction is visually estimated at 15-20%. Grade II diastolic dysfunction.  There is severe global hypokinesis with wall motion abnormalities as described above. Mild left ventricular concentric hypertrophy noted. There is a filling defect noted in the LV apex, seen only on subcostal windows, suggestive of LV apical thrombus. Moderately dilated right ventricle. Right ventricle global systolic function is low normal. The left atrium is severely dilated. Mildly enlarged right atrium. Moderate-severe mitral regurgitation directed anteriorly and centrally. Mild aortic valve regurgitation. Moderate tricuspid regurgitation. Estimated PA pressure 48/22 mmHg. Moderate pulmonic regurgitation. Left pleural effusion. [facility] 8/18/2020 Dr Casey Lee: Left main:  It is a long and large artery with 80% distal stenosis. LAD:  It is a large artery, reaching the apex and giving rise to two diagonal branches and very large septal . The LAD was calcified in its proximal to mid segment. There was an 80% ostial LAD stenosis, 90% discrete mid LAD stenosis at the takeoff of the second diagonal and a large septal , and a 60% to 70% mid discrete stenosis. The first diagonal was very small. The second diagonal was large and trifurcating. There was 80% to 90% ostial stenosis and 80% discrete proximal stenosis in second diagonal, and the lower branch was 100% occluded. The LAD gives collaterals to distal RCA branches. Ramus branch: It is large with 40% to 50% diffuse mid stenosis. Left circumflex: It is 100% occluded proximally. There was late filling of the obtuse marginal branch. RCA:  It is a large dominant artery giving rise to a conus branch, an SA cecil or AV cecil branch, a large RV marginal branch. The RCA is 100% occluded at the mid segment with KARLA-0 flow distally. There was 80% to 90% proximal stenosis of the fair size RV marginal of the proximal segment of the RV marginal.    ASSESSMENT:   1. Acute decompensated heart failure/HFrEF  2. Multivessel coronary disease   3. Newly diagnosed ischemic cardiomyopathy with EF of 15-20% and reduced RV function    4. Valvular heart disease: Moderate to severe MR, moderate TR, mild aortic regurgitation, moderate pulmonic regurgitation   5. Nonsustained VT   6. Bilateral pleural effusions right greater than left   7. Probable LV apical clot    8. PAD with history of left carotid stent   9. Hypotension. History of hypertension   10. Comorbid disease: anxiety/depression, hyperlipidemia, gout        PLAN:  1. Begin Coumadin with Lovenox bridge for apical clot   2. GMDT limited by soft blood pressures: will continue Toprol XL at current dose for now  3.  CT surgery input appreciated: recommend medical therapy and repeat echo in two months, followed by reassessment of surgical candidacy       Discussed with Dr Jian Zhao  Electronically signed by CHRISTI Lara on 8/20/2020 at 3:13 PM

## 2020-08-20 NOTE — CONSULTS
CTS Consult    Patient name: Katie Boyer    Reason for consult: ICM, MVCAD, [address] MR    Referring Physician:  Dr. Roberto Haney    Primary Care Physician: Edilia Seip, DO    Date of service: 8/20/2020    Chief Complaint: SOB    HPI:   Patient was initially admitted to [facility] with shortness of breath and swelling for 1 month. He was admitted and treated for CHF. He was found to have significantly reduced ejection fraction 13% as well as moderate to severe MR. He was transferred to [facility] for [facility] which revealed MVCAD.      Allergies: No Known Allergies    Home medications:    Current Facility-Administered Medications   Medication Dose Route Frequency Provider Last Rate Last Dose    sodium chloride flush 0.9 % injection 10 mL  10 mL Intravenous 2 times per day Ry Blunt MD   10 mL at 08/20/20 0908    sodium chloride flush 0.9 % injection 10 mL  10 mL Intravenous PRN Ry Blunt MD        acetaminophen (TYLENOL) tablet 650 mg  650 mg Oral Q4H PRN Ry Blunt MD        allopurinol (ZYLOPRIM) tablet 300 mg  300 mg Oral Daily Jaime Howell MD   300 mg at 08/20/20 0908    atorvastatin (LIPITOR) tablet 40 mg  40 mg Oral Nightly Jaime Howell MD   40 mg at 08/19/20 2238    citalopram (CELEXA) tablet 10 mg  10 mg Oral Daily Jaime Howell MD   10 mg at 08/20/20 0908    LORazepam (ATIVAN) tablet 0.5 mg  0.5 mg Oral Q6H PRN Jaime Howell MD        Vitamin D (CHOLECALCIFEROL) tablet 1,000 Units  1,000 Units Oral Daily Jaime Howell MD   1,000 Units at 08/20/20 0908    acetaminophen (TYLENOL) tablet 500 mg  500 mg Oral Q6H PRN Jaime Howell MD        trimethobenzamide Chio Risk) injection 200 mg  200 mg Intramuscular Q6H PRN Jaime Howell MD        magnesium hydroxide (MILK OF MAGNESIA) 400 MG/5ML suspension 30 mL  30 mL Oral Daily PRN Jaime Howell MD        aspirin chewable tablet 81 mg  81 mg Oral Daily Jaime Howell MD   81 mg at 08/20/20 0908    metoprolol succinate (TOPROL XL) extended release tablet 25 mg  25 mg Oral Daily Fabian Peterson MD   25 mg at 08/20/20 0908    heparin (porcine) injection 4,130 Units  60 Units/kg Intravenous PRN Fabian Peterson MD        heparin (porcine) injection 2,070 Units  30 Units/kg Intravenous PRN Fabian Peterson MD   2,070 Units at 08/20/20 0908    heparin 25,000 units in dextrose 5% 250 mL infusion  12 Units/kg/hr Intravenous Continuous Fabian Peterson MD 6.2 mL/hr at 08/20/20 0910 9 Units/kg/hr at 08/20/20 0910       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,     Gout     HFrEF (heart failure with reduced ejection fraction) ([facility] [address] 08/19/2020 8/13/2020- echo- LVEF 15-20%, stage II DD, RVSF low normal, moderate-severe MR, moderate TR, moderate HI    History of cardiovascular stress test 07/2015    Lexiscan stress test    Hypertension     Kidney stone     stent/    Osteoarthritis        Past Surgical History:  Past Surgical History:   Procedure Laterality Date    LITHOTRIPSY      MANDIBLE SURGERY      OTHER SURGICAL HISTORY Left 5/8/2014    EXCISION OF OLECRANON BURSA    OTHER SURGICAL HISTORY  02/08/2018    Laparoscopic transabdominal right inguinal hernia repair with mesk and Excision of Lipoma of the Cord    TONSILLECTOMY         Social History:  Social History     Socioeconomic History    Marital status: Single     Spouse name: Not on file    Number of children: Not on file    Years of education: Not on file    Highest education level: Not on file   Occupational History    Not on file   Social Needs    Financial resource strain: Not on file    Food insecurity     Worry: Not on file     Inability: Not on file    Transportation needs     Medical: Not on file     Non-medical: Not on file   Tobacco Use    Smoking status: Former Smoker     Packs/day: 0.25     Years: 50.00     Pack years: 12.50     Types: Cigarettes     Last attempt to quit: 2015     Years since quittin.3    Smokeless tobacco: Never Used   Substance and Sexual Activity    Alcohol use: Yes     Alcohol/week: 1.0 standard drinks     Types: 1 Cans of beer per week     Comment: social    Drug use: No    Sexual activity: Not on file   Lifestyle    Physical activity     Days per week: Not on file     Minutes per session: Not on file    Stress: Not on file   Relationships    Social connections     Talks on phone: Not on file     Gets together: Not on file     Attends Buddhism service: Not on file     Active member of club or organization: Not on file     Attends meetings of clubs or organizations: Not on file     Relationship status: Not on file    Intimate partner violence     Fear of current or ex partner: Not on file     Emotionally abused: Not on file     Physically abused: Not on file     Forced sexual activity: Not on file   Other Topics Concern    Not on file   Social History Narrative    Not on file       Family History:  Family History   Problem Relation Age of Onset    Heart Disease Mother     Stroke Mother     Coronary Art Dis Father        Review of Systems:  Constitutional: Denies fevers, chills, or weight loss. HEENT: Denies visual changes or hearing loss. Heart: As per HPI. Lungs: Denies shortness of breath, cough, or wheezing. Gastrointestinal: Denies nausea, vomiting, constipation, or diarrhea. Genitourinary: dysuria or hematuria. Psychiatric: Patient denies anxiety or depression. Neurologic: Patient denies weakness of the extremities, dizziness, or headaches. All other ROS checked and found to be negative.     Labs:  Recent Labs     20  2129 20  0938 20  0700   WBC 9.6 6.9 7.2   HGB 12.7 11.9* 11.1*   HCT 38.4 36.3* 34.0*    156 146      Recent Labs     20  0952 20  0938 20  0700   BUN 20 19 17   CREATININE 1.0 1.0 1.0       Objective:  Vitals BP (!) 91/54 Pulse 70   Temp 98.7 °F (37.1 °C) (Temporal)   Resp 18   Ht 5' 9\" (1.753 m)   Wt 136 lb (61.7 kg)   SpO2 98%   BMI 20.08 kg/m²   General Appearance: Pleasant 78y.o. year old male who appears stated age. Communicates well, no acute distress. HEENT: Head is normocephalic, atraumatic. EOMs intact, PERRL. Trachea midline. Lungs: Normal respiratory rate and normal effort. He is not in respiratory distress. Breath sounds clear to auscultation. No wheezes. Heart: Normal rate. Regular rhythm. S1 normal and S2 normal. Positive for murmur. Chest: Symmetric chest wall expansion. Extremities: Normal range of motion. Neurological: Patient is alert and oriented to person, place and time. Patient has normal reflexes. Skin: Warm and dry. Abdomen: Abdomen is soft and non-distended. Bowel sounds are normal. There is no abdominal tenderness tenderness. There is no guarding. There is no mass. Pulses: Distal pulses are intact. Skin: Warm and dry without lesions. Findings      Left Ventricle   Micro-bubble contrast injected to enhance left ventricular visualization. Left ventricle is mildly dilated, LVEDD 6.0 cm. LV systolic function is severely reduced. Ejection fraction is visually estimated at 15-20%. Grade II diastolic dysfunction. There is severe global hypokinesis with akinesis of the basal-mid   anterolateral, basal-mid inferolateral wall, apical lateral wall and apex. Mild left ventricular concentric hypertrophy noted. There is a filling defect noted in the LV apex, seen only on subcostal   windows, suggestive of LV thrombus. Right Ventricle   Moderately dilated right ventricle. Right ventricle global systolic function is low normal.      Left Atrium   The left atrium is severely dilated. GAGE 59 ml/m2. The interatrial septum appears intact. Right Atrium   Mildly enlarged right atrium. Mitral Valve   Structurally normal mitral valve.    No evidence of mitral valve stenosis. Moderate-severe mitral regurgitation directed anteriorly and centrally. Tricuspid Valve   The tricuspid valve appears structurally normal.   Moderate tricuspid regurgitation. RVSP is 48 mmHg. Estimated PA pressure 48/22 mmHg. Aortic Valve   The aortic valve appears mildly sclerotic. The aortic valve is trileaflet. No hemodynamically significant aortic stenosis is present. Mild aortic valve regurgitation. Pulmonic Valve   The pulmonic valve was not well visualized. No evidence of pulmonic valve stenosis. Moderate pulmonic regurgitation. Pericardial Effusion   No evidence of pericardial effusion. Pleural Effusion   Left pleural effusion. Aorta   Aortic root dimension within normal limits. Miscellaneous   Dilated Inferior Vena Cava. Conclusions      Summary   Micro-bubble contrast injected to enhance left ventricular visualization. Left ventricle is mildly dilated, LVEDD 6.0 cm. LV systolic function is severely reduced. Ejection fraction is visually estimated at 15-20%. Grade II diastolic dysfunction. There is severe global hypokinesis with wall motion abnormalities as   described above. Mild left ventricular concentric hypertrophy noted. There is a filling defect noted in the LV apex, seen only on subcostal   windows, suggestive of LV apical thrombus. Moderately dilated right ventricle. Right ventricle global systolic function is low normal.   The left atrium is severely dilated. Mildly enlarged right atrium. Moderate-severe mitral regurgitation directed anteriorly and centrally. Mild aortic valve regurgitation. Moderate tricuspid regurgitation. Estimated PA pressure 48/22 mmHg. Moderate pulmonic regurgitation. Left pleural effusion. Assessment:   77 yo male admitted with CHF, ICM, LVEF 15%, diminished RV function, moderate-severe MR, MVCAD     Plan:   Undergoing CABG/MVR at this point would be prohibitive risk.

## 2020-08-20 NOTE — PROGRESS NOTES
Inpatient Cardiology Progress note     PATIENT IS BEING FOLLOWED FOR: Ischemic cardiomyopathy with new acute systolic and diastolic CHF. Niesha Reagan is a 78 y. o. male seen in initial consultation by Dr Lamar Rim: Denies CP. Mildly SOB with ambulating to bathroom. OBJECTIVE: No apparent distress     ROS:  Consist: Denies fevers, chills or night sweats  Heart: Denies chest pain, palpitations, lightheadedness, dizziness or syncope  Lungs: Denies SOB, cough, wheezing, orthopnea or PND  GI: Denies abdominal pain, vomiting or diarrhea    PHYSICAL EXAM:   BP (!) 91/54   Pulse 70   Temp 98.7 °F (37.1 °C) (Temporal)   Resp 18   Ht 5' 9\" (1.753 m)   Wt 136 lb (61.7 kg)   SpO2 98%   BMI 20.08 kg/m²    B/P Range last 24 hours: Systolic (26OBM), WNN:93 , Min:88 , SEEMA:11    Diastolic (02OQR), FRN:44, Min:51, Max:56    CONST: Well developed,  male who appears stated age. Awake, alert and cooperative. No apparent distress  HEENT:   Head- Normocephalic, atraumatic   Eyes- Conjunctivae pink, anicteric  Throat- Oral mucosa pink and moist  Neck-  No stridor, trachea midline, no jugular venous distention. No carotid bruit  CHEST: Chest symmetrical and non-tender to palpation. No accessory muscle use or intercostal retractions  RESPIRATORY:  Lung sounds - faint bibasilar rales. On RA. CARDIOVASCULAR:     Heart Ausculation- Regular rate and rhythm, no murmur heard. PV: 2+ pitting BLE edema. No varicosities. Pedal pulses palpable, no clubbing or cyanosis   ABDOMEN: Soft, non-tender to light palpation. Bowel sounds present. No palpable masses; no abdominal bruit  MS: Good muscle strength and tone. No atrophy or abnormal movements. : Deferred  SKIN: Pale, warm and dry no statis dermatitis or ulcers   NEURO / PSYCH: Oriented to person, place and time. Speech clear and appropriate. Follows all commands.  Pleasant affect       Intake/Output Summary (Last 24 hours) at 8/20/2020 7775  Last data filed at 8/20/2020 4421  Gross per 24 hour   Intake 406 ml   Output 850 ml   Net -444 ml       Weight:   Wt Readings from Last 3 Encounters:   08/20/20 136 lb (61.7 kg)   08/18/20 148 lb 11.2 oz (67.4 kg)   01/14/20 160 lb 12.8 oz (72.9 kg)     Current Inpatient Medications:   sodium chloride flush  10 mL Intravenous 2 times per day    allopurinol  300 mg Oral Daily    atorvastatin  40 mg Oral Nightly    citalopram  10 mg Oral Daily    Vitamin D  1,000 Units Oral Daily    aspirin  81 mg Oral Daily    metoprolol succinate  25 mg Oral Daily       IV Infusions (if any):   heparin (porcine) 9 Units/kg/hr (08/20/20 0910)       DIAGNOSTIC/ LABORATORY DATA:  Labs:   CBC:   Recent Labs     08/19/20  0938 08/20/20  0700   WBC 6.9 7.2   HGB 11.9* 11.1*   HCT 36.3* 34.0*    146     BMP:   Recent Labs     08/19/20  0938 08/20/20  0700    138   K 4.5 4.4   CO2 28 26   BUN 19 17   CREATININE 1.0 1.0   LABGLOM >60 >60   CALCIUM 9.3 9.2     Mag:   Recent Labs     08/18/20  0952 08/19/20  0938   MG 1.8 1.7     HgA1c:   Lab Results   Component Value Date    LABA1C 6.0 (H) 08/15/2020     APTT:  Recent Labs     08/20/20  0700 08/20/20  0855   APTT 38.4* 41.0*     FASTING LIPID PANEL:  Lab Results   Component Value Date    CHOL 182 08/14/2020    HDL 55 08/14/2020    LDLCALC 114 08/14/2020    TRIG 64 08/14/2020       CXR 8/13/2020: Moderate right pleural effusion. Small left pleural effusion.  Retrocardiac left lower lobe opacity that   could represent atelectasis or pneumonia    BLE Dopplers 8/13/2020: No evidence of DVT in either lower extremity    Telemetry: SR    TTE 8/13/2020 Dr Sulma Galvez: Micro-bubble contrast injected to enhance left ventricular visualization. Left ventricle is mildly dilated, LVEDD 6.0 cm. LV systolic function is severely reduced. Ejection fraction is visually estimated at 15-20%. Grade II diastolic dysfunction.  There is severe global hypokinesis with wall motion abnormalities as described above. Mild left ventricular concentric hypertrophy noted. There is a filling defect noted in the LV apex, seen only on subcostal windows, suggestive of LV apical thrombus. Moderately dilated right ventricle. Right ventricle global systolic function is low normal. The left atrium is severely dilated. Mildly enlarged right atrium. Moderate-severe mitral regurgitation directed anteriorly and centrally. Mild aortic valve regurgitation. Moderate tricuspid regurgitation. Estimated PA pressure 48/22 mmHg. Moderate pulmonic regurgitation. Left pleural effusion. [facility] 8/18/2020 Dr Joe Serrano: Left main:  It is a long and large artery with 80% distal stenosis. LAD:  It is a large artery, reaching the apex and giving rise to two diagonal branches and very large septal . The LAD was calcified in its proximal to mid segment. There was an 80% ostial LAD stenosis, 90% discrete mid LAD stenosis at the takeoff of the second diagonal and a large septal , and a 60% to 70% mid discrete stenosis. The first diagonal was very small. The second diagonal was large and trifurcating. There was 80% to 90% ostial stenosis and 80%  discrete proximal stenosis in second diagonal, and the lower branch was 100% occluded. The LAD gives collaterals to distal RCA branches. Ramus branch: It is large with 40% to 50% diffuse mid stenosis. Left circumflex: It is 100% occluded proximally. There was late filling of the obtuse marginal branch. RCA:  It is a large dominant artery giving rise to a conus branch, an SA cecil or AV cecil branch, a large RV marginal branch. The RCA is 100% occluded at the mid segment with KARLA-0 flow distally. There was 80% to 90% proximal stenosis of the fair size RV marginal of the proximal segment of the RV marginal.    ASSESSMENT: as per Dr. Joe Serrano. 1. Acute decompensated heart failure/HFrEF  2. Severe 3V CAD on [facility] 8/18/2020 - >CTS recommending follow-up in 2 months with repeat ECHO. 3. Ischemic cardiomyopathy, EF 15 to 20% with probable apical clot. 4. Nonsustained ventricular tachycardia  5. Bilateral pleural effusions right greater than left  6. BP soft  7. Valvular heart disease: Moderate to severe MR, moderate TR, mild aortic regurgitation, moderate pulmonic regurgitation  8. Probable left ventricular apical thrombus -- on heparin IV gtt. 9. PAD: History of left carotid stent  10. Comorbid disease: Hypertension, anxiety/depression, hyperlipidemia, gout      PLAN: as per Dr. Joe Serrano  1. Discontinue heparin IV gtt. Transition to Lovenox bridge and coumadin. 2. Consult pharmacy to dose coumadin. 3. Continue ASA, Lipitor  4. Continue Toprol-XL (with parameters)   5. Soft BP unable to adjust or add any GDMT  6. Give one dose of IV lasix today. Check BMP, Mg++ and BNP in am.   7. Long discussion had with patient regarding plan of care and cath results. Questions answered  8. Lifevest ordered; to be placed in am.   9. Further recommendations to follow.    10. Tentative discharge tomorrow as per primary     Discussed with Dr Joe Serrano  Electronically signed by CHRISTI Doan CNP on 8/20/2020 at 3:35 PM.

## 2020-08-20 NOTE — PROGRESS NOTES
Pharmacy Consultation Note  (Anticoagulant Dosing and Monitoring)    Initial consult date: 8/20  Consulting physician: Regina Morales    Allergies:  Patient has no known allergies. 78 y.o. male      Ht Readings from Last 1 Encounters:   08/18/20 5' 9\" (1.753 m)     Wt Readings from Last 1 Encounters:   08/20/20 136 lb (61.7 kg)         Warfarin Indication Target   INR Range Home   Dose  (if applicable) Diet/Feeding Tube   LV apical thrombus 2-3 New start Cardiac (8/19)       Vitamin K or Blood product  Administration Date                 Warfarin drug-drug interactions  Start  Stop Home Med? Comments                                 TSH:    Lab Results   Component Value Date    TSH 2.190 12/06/2017        Hepatic Function Panel:                            Lab Results   Component Value Date    ALKPHOS 137 08/14/2020    ALT 73 08/14/2020    AST 51 08/14/2020    PROT 6.4 08/14/2020    BILITOT 0.9 08/14/2020    LABALBU 3.8 08/14/2020    LABALBU 4.8 09/21/2011       Date Warfarin Dose INR Heparin or LMWH HBG/  HCT PLT Comment   8/20 2.5 mg -- Lovenox 60 mg subQ BID 11.1/34 146                                          Assessment and Plan:  · 78 yom with new left ventricle apical thrombus on heparin drip now switched to lovenox bridge to warfarin.  Patient is new start on warfarin  · INR goal 2-3  · Warfarin 2.5 mg tonight  · Daily PT/INR until the INR is stable within the therapeutic range  · Pharmacist will follow and monitor/adjust dosing as necessary    Gustavo Reyes PharmD, BCPS 8/20/2020 4:20 PM  Phone: [phone]

## 2020-08-20 NOTE — PLAN OF CARE
8/20/2020- Pt admitted with newly diagnosed ICM, moderate MR, HFrEF. He was transferred from [place] [address] to [facility] for a [facility] revealing MVCAD. I provided him with the following Heart Failure educational material,  which included:      *The Heart Failure book- \"Caring for Your Heart: Living Well with Heart Failure\"      *The Heart Failure Zones       *The Sodium Content pamphlet      *\"What Foods Should You Avoid? \" information sheet. *Digital scale for daily weights         We reviewed the introduction to Heart Failure, the HF zones, signs and symptoms to report on day 1 of onset, medications, medication compliance, the importance of obtaining daily weights, following a low sodium diet, reading food labels for the sodium content, and keeping physician appointments. He quit smoking in 2015. We discussed writing / tracking his weights on a calendar / paper because 5# in 1 week can sneak up if you are not tracking it. He can also take his charted weights to his doctor appointments to be reviewed. Pt teary eyed as he lost his partner of 40 years this past May. He states he has a lot of anxiety. Emotional support given. I advised patient he can reduce the risk for Heart Failure exacerbations by modifying / controlling the risk factors he has. We discussed self-managed care which includes the following:  to take his medications as prescribed, if he experiences any intolerable side effects to any medications to please call his cardiologist / doctor, do not just stop taking the medication; follow a cardiac heart healthy / low sodium diet; and weigh himself daily. Activity per doctor order as his EF is low and just starting medications. Dr. Ana Pineda was just in to see pt. Discussed after being on heart medications will re-evaluate in a couple of months for possible CABG.          We discussed calling his cardiologist / doctor with a weight gain of 3# in one day or a total of 5# or more in one week. Also, if he would have a significant weight loss of 3# or more in one day to call his doctor, he may have to decrease or hold his diuretic dose. On days he feels nauseated and not eating / drinking, having emesis or diarrhea,  informed to call his cardiologist  / doctor, he may have to decrease or hold his diuretic to avoid dehydration. I stressed the importance of informing his cardiologist the first day of onset of any of the signs and symptoms in the \"Yellow Zone\" of the HF Zones. He verbalizes understanding. I called and left message with Dr. Mamadou Kent for post hospital f/u appt. Await their return call. Echocardiogram / EF:  15-20%    BNP:   Lab Results   Component Value Date    PROBNP 11,227 (H) 08/13/2020       History of:    has a past medical history of Blurred vision, right eye, Gout, HFrEF (heart failure with reduced ejection fraction) ([address] History of cardiovascular stress test, Hypertension, Kidney stone, and Osteoarthritis. has a past surgical history that includes Mandible surgery; Lithotripsy; other surgical history (Left, 5/8/2014); Tonsillectomy; and other surgical history (02/08/2018).     Medications:    sodium chloride flush  10 mL Intravenous 2 times per day    allopurinol  300 mg Oral Daily    atorvastatin  40 mg Oral Nightly    citalopram  10 mg Oral Daily    Vitamin D  1,000 Units Oral Daily    aspirin  81 mg Oral Daily    metoprolol succinate  25 mg Oral Daily      heparin (porcine) 9 Units/kg/hr (08/20/20 0910)       Code Status:Full Code    The patient is ordered:  Diet: DIET CARDIAC; Low Sodium (2 GM)   Sodium/fluid restriction daily ordered (fluid restriction recommended if patient is hyponatremic and/or diuretic is initiated or increased):  [] Yes     [] No  FR:   Daily Weights:   Patient Vitals for the past 96 hrs (Last 3 readings):   Weight   08/20/20 0400 136 lb (61.7 kg)   08/18/20 2100 135 lb 9.6 oz (61.5 kg)   08/18/20 1204 152 lb (68.9 kg)     I/O: Intake/Output Summary (Last 24 hours) at 8/20/2020 1117  Last data filed at 8/20/2020 7268  Gross per 24 hour   Intake 406 ml   Output 850 ml   Net -444 ml        The Heart Failure Booklet was given to the patient, which addresses:  · What is Heart Failure? · Things You Can Do to Live Well with HF  · How to Take Your Medications  · How to Eat Less Salt  · Exercising Well with Heart Failure  · Signs and symptoms of HF to report  · Weight Yourself Each Day  · Home Self Management- activity, weight tracking, taking medications as prescribed, meals /diet planning (sodium and fluid restriction), how to read food labels, keeping physician follow ups, smoking cessation, follow the Heart Failure Zones    Instructed  to call 911 if you have any of the following symptoms:    ·    Struggling to breathe unrelieved with rest,  ·    Having chest pain, confusion or can't think clearly  ·    Have confusion or cant think clearly    Readmission Risk Score: 6        Discharge Plan:  I placed the Heart Failure Home Instructions in his discharge instructions. Per Heart Failure GWTG, the patient should have a follow-up appointment made within 7 days of discharge.    -I called and left message with Dr. Ceferino Castanon for post hospital f/u appt. Await their return call. Ravindra QIUN, RN, CHFN    Heart Failure Navigator        CONGESTIVE HEART FAILURE (CHF) GUIDELINES  (Must be completed for Primary Diagnosis CHF or History of CHF)    Type of CHF:    [x] Acute   [] Chronic     [] Acute on Chronic     Ventricular Function Assessment (check):             [] HFpEF  [] HFpEF, borderline  [] HFpEF, improved  [x] HFrEF  Ejection Fraction (%):                                                                       Angiotensin-Converting-Enzyme (ACE) inhibitor ordered:  [] Yes  [x] No (specify contraindication):  [] Renal Insufficiency  [] Cough  [x] Hypotension  [] Allergy/angioedema  [] No left ventricular systolic dysfunction (LVSD)  [] Hyperkalemia  [] Moderate to severe aortic stenosis  [x] Other (Specify): will add as bp permits    Angiotensin II receptor blockers (ARB) ordered:  [] Yes  [x] No (specify contraindication):  [] Renal Insufficiency  [x] Hypotension  [] Allergy/angioedema  [] No LVSD  [] Hyperkalemia  [] Moderate to severe aortic stenosis  [] Other (Specify):      ARNI - Angiotensin Receptor Neprilysin Inhibitor ordered:  [] Yes  [x] No      ACC/AHA Guidelines Beta Blocker (Carvedilol, Metoprolol Succinate, or Bisoprolol) ordered:    [x] Yes  [] No (specify contraindication):  [] Bradycardia  [] Hypotension  [] LVD  [] 2nd or 3rd degree heart block  [] Bronchospastic airway disease  [] Decompensated CHF  [] Other (Specify):

## 2020-08-21 VITALS
DIASTOLIC BLOOD PRESSURE: 51 MMHG | HEART RATE: 71 BPM | BODY MASS INDEX: 19.85 KG/M2 | OXYGEN SATURATION: 98 % | SYSTOLIC BLOOD PRESSURE: 90 MMHG | HEIGHT: 69 IN | WEIGHT: 134 LBS | RESPIRATION RATE: 18 BRPM | TEMPERATURE: 97.5 F

## 2020-08-21 LAB
ANION GAP SERPL CALCULATED.3IONS-SCNC: 12 MMOL/L (ref 7–16)
APTT: 32.9 SEC (ref 24.5–35.1)
APTT: 40 SEC (ref 24.5–35.1)
APTT: 40 SEC (ref 24.5–35.1)
BUN BLDV-MCNC: 18 MG/DL (ref 8–23)
CALCIUM SERPL-MCNC: 9 MG/DL (ref 8.6–10.2)
CHLORIDE BLD-SCNC: 99 MMOL/L (ref 98–107)
CO2: 27 MMOL/L (ref 22–29)
CREAT SERPL-MCNC: 1 MG/DL (ref 0.7–1.2)
GFR AFRICAN AMERICAN: >60
GFR NON-AFRICAN AMERICAN: >60 ML/MIN/1.73
GLUCOSE BLD-MCNC: 91 MG/DL (ref 74–99)
INR BLD: 1
INR BLD: 1.1
POTASSIUM REFLEX MAGNESIUM: 4.1 MMOL/L (ref 3.5–5)
PRO-BNP: 4926 PG/ML (ref 0–125)
PROTHROMBIN TIME: 11.7 SEC (ref 9.3–12.4)
PROTHROMBIN TIME: 12 SEC (ref 9.3–12.4)
SODIUM BLD-SCNC: 138 MMOL/L (ref 132–146)

## 2020-08-21 PROCEDURE — 85730 THROMBOPLASTIN TIME PARTIAL: CPT

## 2020-08-21 PROCEDURE — 80048 BASIC METABOLIC PNL TOTAL CA: CPT

## 2020-08-21 PROCEDURE — 6360000002 HC RX W HCPCS: Performed by: INTERNAL MEDICINE

## 2020-08-21 PROCEDURE — 2580000003 HC RX 258: Performed by: INTERNAL MEDICINE

## 2020-08-21 PROCEDURE — 36415 COLL VENOUS BLD VENIPUNCTURE: CPT

## 2020-08-21 PROCEDURE — 6370000000 HC RX 637 (ALT 250 FOR IP): Performed by: INTERNAL MEDICINE

## 2020-08-21 PROCEDURE — 99232 SBSQ HOSP IP/OBS MODERATE 35: CPT | Performed by: NURSE PRACTITIONER

## 2020-08-21 PROCEDURE — 83880 ASSAY OF NATRIURETIC PEPTIDE: CPT

## 2020-08-21 PROCEDURE — 85610 PROTHROMBIN TIME: CPT

## 2020-08-21 RX ADMIN — SODIUM CHLORIDE, PRESERVATIVE FREE 10 ML: 5 INJECTION INTRAVENOUS at 09:31

## 2020-08-21 RX ADMIN — METOPROLOL SUCCINATE 25 MG: 25 TABLET, EXTENDED RELEASE ORAL at 09:30

## 2020-08-21 RX ADMIN — CITALOPRAM 10 MG: 20 TABLET, FILM COATED ORAL at 09:30

## 2020-08-21 RX ADMIN — VITAMIN D 1000 UNITS: 25 TAB ORAL at 09:31

## 2020-08-21 RX ADMIN — ENOXAPARIN SODIUM 60 MG: 60 INJECTION SUBCUTANEOUS at 09:30

## 2020-08-21 RX ADMIN — ALLOPURINOL 300 MG: 300 TABLET ORAL at 09:29

## 2020-08-21 RX ADMIN — ASPIRIN 81 MG CHEWABLE TABLET 81 MG: 81 TABLET CHEWABLE at 09:29

## 2020-08-21 ASSESSMENT — PAIN SCALES - GENERAL
PAINLEVEL_OUTOF10: 0

## 2020-08-21 NOTE — PLAN OF CARE
Problem: Infection - Surgical Site:  Goal: Will show no infection signs and symptoms  Description: Will show no infection signs and symptoms  Outcome: Not Met This Shift

## 2020-08-21 NOTE — PROGRESS NOTES
Inpatient Cardiology Progress note     PATIENT IS BEING FOLLOWED FOR: Ischemic cardiomyopathy with new acute systolic and diastolic CHF. Schuyler Sanchez is a 78 y. o. male seen in initial consultation by Dr Villalta Jump: Denies CP and shortness of breath  OBJECTIVE: No apparent distress     ROS:  Consist: Denies fevers, chills or night sweats  Heart: Denies chest pain, palpitations, lightheadedness, dizziness or syncope  Lungs: Denies SOB, cough, wheezing, orthopnea or PND  GI: Denies abdominal pain, vomiting or diarrhea    PHYSICAL EXAM:   BP (!) 91/52   Pulse 66   Temp 98.6 °F (37 °C) (Temporal)   Resp 18   Ht 5' 9\" (1.753 m)   Wt 134 lb (60.8 kg)   SpO2 98%   BMI 19.79 kg/m²    B/P Range last 24 hours: Systolic (14AZS), DVZ:09 , Min:78 , RXY:32    Diastolic (65AQG), GVD:32, Min:52, Max:62    CONST: Well developed,  male who appears stated age. Awake, alert and cooperative. No apparent distress  HEENT:   Head- Normocephalic, atraumatic   Eyes- Conjunctivae pink, anicteric  Throat- Oral mucosa pink and moist  Neck-  No stridor, trachea midline, no jugular venous distention. No carotid bruit  CHEST: Chest symmetrical and non-tender to palpation. No accessory muscle use or intercostal retractions. LifeVest in place. RESPIRATORY:  Lung sounds - faint bibasilar rales. On RA. CARDIOVASCULAR:     Heart Ausculation- Regular rate and rhythm, no murmur heard. PV: Trace -1+ pitting BLE edema. No varicosities. Pedal pulses palpable, no clubbing or cyanosis   ABDOMEN: Soft, non-tender to light palpation. Bowel sounds present. No palpable masses; no abdominal bruit  MS: Good muscle strength and tone. No atrophy or abnormal movements. : Deferred  SKIN: Pale, warm and dry no statis dermatitis or ulcers   NEURO / PSYCH: Oriented to person, place and time. Speech clear and appropriate. Follows all commands.  Pleasant affect       Intake/Output Summary (Last 24 hours) at 8/21/2020 repeat ECHO. 3. Ischemic cardiomyopathy, EF 15 to 20% with probable apical clot. 4. Nonsustained ventricular tachycardia  5. Bilateral pleural effusions right greater than left on admission:   6. Borderline hypotension  7. Valvular heart disease: Moderate to severe MR, moderate TR, mild aortic regurgitation, moderate pulmonic regurgitation  8. Probable left ventricular apical thrombus -- on heparin IV gtt. 9. PAD: History of left carotid stent  10. Comorbid disease: Hypertension, anxiety/depression, hyperlipidemia, gout      PLAN: as per Dr. Casey Lee  1. Transition to Lovenox bridge and coumadin for apical clot. 2. Continue ASA and Lipitor  3. Continue Toprol-XL (with parameters)   4. Titrate GDMT in outpatient: Soft BP unable to adjust or add any GDMT during hospitalization. 5. Long discussion had with patient regarding plan of care and cath results. Questions answered  6. Lifevest upon discharge; applied to patient this am.   7. Okay for discharge from cardiology standpoint when okay with primary  8. Follow-up with Alyssa Hernandez on 8/25/2020 at 11:30 AM  9.  Follow-up with Dr. Casey Lee on 9/3/2020 at 9 AM    Discussed with Dr Casey Lee  Electronically signed by CHRISTI Coronel CNP on 8/21/2020 at 1:46 PM.

## 2020-08-21 NOTE — PROGRESS NOTES
Pharmacy Consultation Note  (Anticoagulant Dosing and Monitoring)    Initial consult date: 8/20  Consulting physician: Emily BARRAZA    Allergies:  Patient has no known allergies. 78 y.o. male      Ht Readings from Last 1 Encounters:   08/21/20 5' 9\" (1.753 m)     Wt Readings from Last 1 Encounters:   08/21/20 134 lb (60.8 kg)         Warfarin Indication Target   INR Range Home   Dose  (if applicable) Diet/Feeding Tube   LV apical thrombus 2-3 New start Cardiac (8/19)       Vitamin K or Blood product  Administration Date                 Warfarin drug-drug interactions  Start  Stop Home Med? Comments                                 TSH:    Lab Results   Component Value Date    TSH 2.190 12/06/2017        Hepatic Function Panel:                            Lab Results   Component Value Date    ALKPHOS 137 08/14/2020    ALT 73 08/14/2020    AST 51 08/14/2020    PROT 6.4 08/14/2020    BILITOT 0.9 08/14/2020    LABALBU 3.8 08/14/2020    LABALBU 4.8 09/21/2011       Date Warfarin Dose INR Heparin or LMWH HBG/  HCT PLT Comment   8/20 2.5 mg -- Lovenox 60 mg subQ BID 11.1/34 146    8/21 2.5 mg 1.1 Lovenox 60 mg subQ BID -- --                                 Assessment and Plan:  · 78 yom with new left ventricle apical thrombus on heparin drip now switched to lovenox bridge to warfarin.  Patient is new start on warfarin  · INR goal 2-3  · 8/21: INR 1.1  · Warfarin 2.5 mg tonight  · Daily PT/INR until the INR is stable within the therapeutic range  · Pharmacist will follow and monitor/adjust dosing as necessary    Lakisha Blunt, PharmD, BCPS 8/21/2020 3:56 PM  Phone: [phone]

## 2020-08-21 NOTE — DISCHARGE SUMMARY
Physician Discharge Summary     Patient ID:  Beatriz Sample  [id number]  78 y.o.  1950    Admit date: 8/18/2020    Discharge date and time:  8/21/2020    Discharge Diagnoses: Principal Problem:    CAD in native artery  Active Problems:    HTN (hypertension)    Gout    Ischemic cardiomyopathy    Acute systolic CHF (congestive heart failure) (HCC)    Apical mural thrombus  Resolved Problems:    * No resolved hospital problems.  *      Consults: IP CONSULT TO CARDIOTHORACIC SURGERY  IP CONSULT TO HEART FAILURE NURSE/COORDINATOR  IP CONSULT TO PHARMACY    Procedures: See below    Hospital Course: 78-year-old male history of hypertension and gout admitted with ischemic cardiomyopathy, acute systolic heart failure, suspected apical mural thrombus transferred from [address] [place] [address] for left heart catheterization 8/18 revealing multivessel CAD.     Heparin drip transition to Lovenox bridge and Coumadin  ASA  High intensity statin  GDMT--metoprolol succinate with hold parameters, add lisinopril as pressure permits  Cardiology consult appreciated-- discussed  CT surgery appreciated recommends medical management  Medications for other co morbidities cont as appropriate w dosage adjustments as necessary  PT/OT  DVT PPx  DC planning home with Lifevest,  Lovenox bridge and Coumadin    Discharge Exam:  See progress note from today    Condition:  Stable    Disposition: home    Patient Instructions:   Current Discharge Medication List      START taking these medications    Details   warfarin (COUMADIN) 5 MG tablet Take 1 tablet by mouth daily Need INR checked Monday  Qty: 30 tablet, Refills: 3      enoxaparin (LOVENOX) 60 MG/0.6ML injection Inject 0.6 mLs into the skin 2 times daily  Qty: 14 Syringe, Refills: 3         CONTINUE these medications which have CHANGED    Details   aspirin 81 MG chewable tablet Take 1 tablet by mouth daily  Qty: 30 tablet, Refills: 3      atorvastatin (LIPITOR) 40 MG tablet Take 1 tablet by mouth

## 2020-08-24 ENCOUNTER — HOSPITAL ENCOUNTER (OUTPATIENT)
Age: 70
Discharge: HOME | End: 2020-08-26
Payer: MEDICARE

## 2020-08-24 LAB
INR BLD: 1.4
PROTHROMBIN TIME: 15.6 SEC (ref 9.3–12.4)

## 2020-08-24 PROCEDURE — 85610 PROTHROMBIN TIME: CPT

## 2020-08-25 ENCOUNTER — OFFICE VISIT (OUTPATIENT)
Dept: CARDIOLOGY CLINIC | Age: 70
End: 2020-08-25
Payer: MEDICARE

## 2020-08-25 VITALS
WEIGHT: 139.6 LBS | BODY MASS INDEX: 20.68 KG/M2 | DIASTOLIC BLOOD PRESSURE: 60 MMHG | OXYGEN SATURATION: 99 % | SYSTOLIC BLOOD PRESSURE: 100 MMHG | HEART RATE: 82 BPM | RESPIRATION RATE: 20 BRPM | HEIGHT: 69 IN

## 2020-08-25 PROCEDURE — 1111F DSCHRG MED/CURRENT MED MERGE: CPT | Performed by: NURSE PRACTITIONER

## 2020-08-25 PROCEDURE — 1123F ACP DISCUSS/DSCN MKR DOCD: CPT | Performed by: NURSE PRACTITIONER

## 2020-08-25 PROCEDURE — 4040F PNEUMOC VAC/ADMIN/RCVD: CPT | Performed by: NURSE PRACTITIONER

## 2020-08-25 PROCEDURE — 3017F COLORECTAL CA SCREEN DOC REV: CPT | Performed by: NURSE PRACTITIONER

## 2020-08-25 PROCEDURE — G8427 DOCREV CUR MEDS BY ELIG CLIN: HCPCS | Performed by: NURSE PRACTITIONER

## 2020-08-25 PROCEDURE — 93000 ELECTROCARDIOGRAM COMPLETE: CPT | Performed by: INTERNAL MEDICINE

## 2020-08-25 PROCEDURE — G8420 CALC BMI NORM PARAMETERS: HCPCS | Performed by: NURSE PRACTITIONER

## 2020-08-25 PROCEDURE — 1036F TOBACCO NON-USER: CPT | Performed by: NURSE PRACTITIONER

## 2020-08-25 PROCEDURE — 99215 OFFICE O/P EST HI 40 MIN: CPT | Performed by: NURSE PRACTITIONER

## 2020-08-25 RX ORDER — SACUBITRIL AND VALSARTAN 24; 26 MG/1; MG/1
1 TABLET, FILM COATED ORAL NIGHTLY
Qty: 60 TABLET | Refills: 3 | Status: SHIPPED | OUTPATIENT
Start: 2020-08-25

## 2020-08-25 RX ORDER — BUMETANIDE 1 MG/1
1 TABLET ORAL DAILY PRN
Qty: 30 TABLET | Refills: 3 | Status: SHIPPED
Start: 2020-08-25 | End: 2020-12-16

## 2020-08-25 NOTE — PROGRESS NOTES
[facility] Cardiology   Office Visit         Reason for Visit: Heart failure    Primary Cardiologist: Dr. Prem Dyer         History of Present Illness:     Mr. Aba Collado is a 78year old male with a PMHx of HFrEF, ischemic cardiomyopathy, CAD with MVD, moderate-severe MR, RV dysfunction, HTN, HLD, carotid stenosis, anxiety/depression. He was recently hospitalized for acute heart failure, TTE demonstrated LVEF 15-20%, moderate to severe MR, RV dysfunction with moderate TR. He underwent LHC that demonstrated multivessel disease. He was evaluated by CTS who recommended HF medical therapy for at least 2 months prior to reevaluation for surgery. He was placed in a LifeVest and discharged home with subjective improvement. He presents today for post acute heart failure hospitalization follow-up, since discharge from the hospital he has been compliant with all of his current cardiac medications he denies any dizziness, lightheadedness, near syncope or falls. He denies any chest pain pressure, heaviness, palpitations. A close family friend is assisting him with care post discharge, they have been monitoring his weights closely and he has had a 4 pound weight gain in the last 3 days. He also has increased lower extremity edema and mild LINDSEY. He is monitoring his diet for sodium content and staying well-hydrated. He is wearing his LifeVest and denies any discharges or significant alarms. He reports dyspnea with exertion, shortness of breath, or decline in overall functional capacity. He denies orthopnea, PND, nocturnal cough or hemoptysis. He denies abdominal distention or bloating, early satiety, anorexia/change in appetite, unintentional weight loss. He does lower extremity edema. He denies exertional lightheadedness. He denies palpitations, syncope or near syncope. He denies any strokelike symptoms. Review of systems is negative for chest pain, pressure, discomfort.  When ambulating on an incline, He does not leg claudication. History is negative for neurological symptoms including transient loss of vision, asymmetric weakness, aphasia, dysphasia, numbness, tingling.          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    Chronic gout of right elbow 06/27/2012    Gout synovitis 02/07/2012    Bursal abscess 10/05/2011    HTN (hypertension) 04/20/2011    OA (osteoarthritis) 04/20/2011    Hyperlipidemia 04/20/2011       Past Medical History:   Diagnosis Date    Blurred vision, right eye 2007    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,     CAD (coronary artery disease)     Gout     HFrEF (heart failure with reduced ejection fraction) ([address] 08/19/2020 8/13/2020- echo- LVEF 15-20%, stage II DD, RVSF low normal, moderate-severe MR, moderate TR, moderate SC    HFrEF (heart failure with reduced ejection fraction) ([address]     History of cardiovascular stress test 07/2015    Lexiscan stress test    HLD (hyperlipidemia)     Hypertension     Ischemic cardiomyopathy     Kidney stone     stent/    NSVT (nonsustained ventricular tachycardia) ([address]     Osteoarthritis     PAD (peripheral artery disease) ([address]     Valvular heart disease          Past Surgical History:   Procedure Laterality Date    CARDIAC CATHETERIZATION  08/18/2020    LITHOTRIPSY      MANDIBLE SURGERY      OTHER SURGICAL HISTORY Left 5/8/2014    EXCISION OF OLECRANON BURSA    OTHER SURGICAL HISTORY  02/08/2018    Laparoscopic transabdominal right inguinal hernia repair with mesk and Excision of Lipoma of the Cord    TONSILLECTOMY           No Known Allergies      Outpatient Medications Marked as Taking for the 8/25/20 encounter (Office Visit) with Nafisa Blocker, APRN - CNP   Medication Sig Dispense Refill    warfarin (COUMADIN) 5 MG tablet Take 1 tablet by mouth daily Need INR checked Monday 30 tablet 3    enoxaparin (LOVENOX) 60 MG/0.6ML injection Inject 0.6 mLs into the skin 2 times daily 14 Syringe 3    aspirin 81 MG chewable tablet Take 1 tablet by mouth daily 30 tablet 3    atorvastatin (LIPITOR) 40 MG tablet Take 1 tablet by mouth nightly 30 tablet 3    metoprolol succinate (TOPROL XL) 25 MG extended release tablet Take 1 tablet by mouth daily 30 tablet 3    citalopram (CELEXA) 10 MG tablet Take 10 mg by mouth daily      LORazepam (ATIVAN) 0.5 MG tablet Take 0.5 mg by mouth every 6 hours as needed for Anxiety.  allopurinol (ZYLOPRIM) 300 MG tablet TAKE ONE TABLET BY MOUTH EVERY DAY 30 tablet 5           Guideline directed medical/device therapy:  ARNI/ACE I/ARB: No  Beta blocker: Yes  Aldosterone antagonist:  No  ICD/CRT-P/-D:  LifeVest  QRS interval on recent ECG (personally reviewed/interpreted): <120 ms  Percentage RV pacing (personally reviewed/interpreted): %/NA        Review of Systems:   Cardiac: As per HPI  General: No fever, chills, rigors  Pulmonary: As per HPI  HEENT: No visual disturbances, difficult swallowing  GI: No nausea, vomiting, abdominal pain  : No dysuria or hematuria  Endocrine: No thyroid disease or diabetes  Musculoskeletal: URIARTE x 4, no focal motor deficits  Skin: Intact, no rashes  Neuro/Psych: No headache or seizures        Weights:   Wt Readings from Last 3 Encounters:   08/25/20 139 lb 9.6 oz (63.3 kg)   08/21/20 134 lb (60.8 kg)   08/18/20 148 lb 11.2 oz (67.4 kg)           Physical Examination:     /60 (Site: Right Upper Arm, Position: Sitting, Cuff Size: Medium Adult)   Ht 5' 9\" (1.753 m)   Wt 139 lb 9.6 oz (63.3 kg)   SpO2 99%   BMI 20.62 kg/m²     CONSTITUTIONAL: Alert and oriented times 3, no acute distress and cooperative to examination with proper mood and affect. SKIN: Skin color, texture, turgor normal. No rashes or lesions. LYMPH: no cervical nodes, no inguinal nodes  HEENT: Head is normocephalic, atraumatic. EOMI, PERRLA. NECK: Supple, symmetrical, trachea midline, no adenopathy, thyroid symmetric, not enlarged and no tenderness, skin normal. No jvd +hjr. CHEST/LUNGS: chest symmetric with normal A/P diameter, normal respiratory rate and rhythm, lungs clear to auscultation without wheezes, rales or rhonchi. No accessory muscle use. Scars None   CARDIOVASCULAR: Heart sounds are normal.  Regular rate and rhythm without murmur, gallop or rub. Normal S1 and S2. . Carotid and femoral pulses 2+/4 and equal bilaterally. ABDOMEN: Normal shape. No and Laparoscopic scar(s) present. Normal bowel sounds. No bruits. soft, nondistended, no masses or organomegaly. no evidence of hernia. Percussion: Normal without hepatosplenomegally. Tenderness: absent. RECTAL: deferred, not clinically indicated  NEUROLOGIC: There are no focalizing motor or sensory deficits. CN II-XII are grossly intact. Thomasena Dilling EXTREMITIES: no cyanosis, no clubbing. 1-2+ bilateral lower extremity edema. Warm and well perfused. All the following diagnostics were personally reviewed and interpreted by me.        LAB DATA:     8/21/2020 03:59   Sodium 138   Potassium 4.1   Chloride 99   CO2 27   BUN 18   Creatinine 1.0   Anion Gap 12   GFR Non- >60   GFR African American >60   Glucose 91   Calcium 9.0   Pro-BNP 4,926 (H)       IMAGING:    CXR (8/13/2020)  FINDINGS:   There is a moderate right pleural effusion, obscuring the right lung base   with some basilar atelectasis.  Strandy density is seen in the retrocardiac   portion of the left lower lobe that could be related to atelectasis or   pneumonia.  The left lung is otherwise clear. Lydia Nash is a small left   effusion.  The cardiac silhouette is poorly visualized due to pleural effusion. Impression:   1. Moderate right pleural effusion. 2. Small left pleural effusion.  Retrocardiac left lower lobe opacity that could represent atelectasis or pneumonia. CARDIAC TESTING:    [facility] (8/18/2020)  FINDINGS:  HEMODYNAMIC RESULTS:  Aortic pressure 74/45 mmHg. CORONARY ANATOMY:  Left main:  It is a long and large artery with 80% distal stenosis. LAD:  It is a large artery, reaching the apex and giving rise to two  diagonal branches and very large septal . The LAD was  calcified in its proximal to mid segment. There was an 80% ostial LAD  stenosis, 90% discrete mid LAD stenosis at the takeoff of the second  diagonal and a large septal , and a 60% to 70% mid discrete  stenosis. The first diagonal was very small. The second diagonal was  large and trifurcating. There was 80% to 90% ostial stenosis and 80%  discrete proximal stenosis in second diagonal, and the lower branch was  100% occluded. The LAD gives collaterals to distal RCA branches. Ramus branch: It is large with 40% to 50% diffuse mid stenosis. Left circumflex: It is 100% occluded proximally. There was late filling of the obtuse marginal branch. RCA:  It is a large dominant artery giving rise to a conus branch, an SA  cecil or AV cecil branch, a large RV marginal branch. The RCA is 100%  occluded at the mid segment with KARLA-0 flow distally. There was 80% to  90% proximal stenosis of the fair size RV marginal of the proximal  segment of the RV marginal.  IMPRESSION:  1. Severe triple-vessel CAD. 2.  Ischemic cardiomyopathy with probable apical clot. TTE (8/14/2020, Dr. Shanice Mejia)   Summary:   Left ventricle is mildly dilated, LVEDD 6.0 cm. LV systolic function is severely reduced. Ejection fraction is visually estimated at 15-20%. Grade II diastolic dysfunction. There is severe global hypokinesis with wall motion abnormalities as   described above.    Mild left ventricular concentric hypertrophy noted. There is a filling defect noted in the LV apex, seen only on subcostal   windows, suggestive of LV apical thrombus. Moderately dilated right ventricle. Right ventricle global systolic function is low normal.   The left atrium is severely dilated. Mildly enlarged right atrium. Moderate-severe mitral regurgitation directed anteriorly and centrally. Mild aortic valve regurgitation. Moderate tricuspid regurgitation. Estimated PA pressure 48/22 mmHg. Moderate pulmonic regurgitation. Left pleural effusion. EKG  Sinus rhythm  Frequent PAC s   Left atrial enlargement. Nonspecific T-abnormality. ASSESSMENT:  1. Acute on chronic HFrEF  2. ACC stage C / NYHA class III  3. Mildly hypervolemic   4. Ischemic cardiomyopathy  5. LVEF 15-20%, LVEDD 6.3, LVMI 179  6. CAD - MVD per [facility] (8/18/2020) - following with CTS who reccommended HF treatment then reevaluation in 2 months. 7. LV thrombus - on warfarin. 8. Moderate-severe MR  9. RV dysfunction with moderate TR  10. Mild AI  11. LifeVest in place - denies discharges   12. HTN  13. HLD  14. PAD s/p left carotid stent  15. Anxiety/Depression  16. Gout      PLAN:  1. Start Entresto 24/26 mg nightly     2. Get blood work in one week - once we review your blood work will continue to slowly increase your heart medication as able. 3. Continue rest of current cardiac mediations as ordered    4. Bumex 1 mg daily as need for weight gain, shortness of breath, swelling - take 1 dose today when you get home. 5. Elevate your legs as much as possible    6. Wear compression stockings on your lower legs    7. Return visit with Dr. Sivan Armas in 1 month.      8. Weigh yourself daily    -Stay Hydrated    -Diet should sodium restricted to 2 grams    -Again watch your daily weight trends and if you gain water weight please follow below instructions.    -If you gain 3-5 pounds in 2-3 days OR notice that you are retaining fluid in anyway just like you did before then take an extra dose of your water pill (bumetanide/Bumex) every day until you lose the weight or feel better.     -If you notice that you have taken more than 3 extra doses in 1 week then please call and let us know. -If at any time you feel that you are retaining fluid, your medications are not working, or you feel ill in anyway, then please call us for either same day appointment or the next day, and for instructions. Our goal is to keep you out of the emergency room and the hospital and we have ways to do it. You just need to call us in a timely manner.     -If you become sick for other reasons, and notice that you are not urinating as much, the urine is very dark, you have significant diarrhea or vomiting, then please DO NOT take your water pill and CALL US immediately. > 45 minutes was spent reviewing the chart and > 50% of that time was spent face-to-face with patient and family friend educating on new diagnosis of heart failure, treatment, prognosis, medications and diet.       Dave BARRAZA-CNP  [facility]

## 2020-08-25 NOTE — PATIENT INSTRUCTIONS
1. Start Entresto 24/26 mg nightly     2. Get blood work in one week    3. Continue rest of current cardiac mediations as ordered    4. Bumex 1 mg daily as need for weight gain, shortness of breath, swelling - take 1 dose today when you get home. 5. Elevate your legs as much as possible    6. Wear compression stockings on your lower legs    7. Return visit with Dr. Sivan Armas in 1 month. 8. Weigh yourself daily    -Stay Hydrated    -Diet should sodium restricted to 2 grams    -Again watch your daily weight trends and if you gain water weight please follow below instructions.    -If you gain 3-5 pounds in 2-3 days OR notice that you are retaining fluid in anyway just like you did before then take an extra dose of your water pill (bumetanide/Bumex) every day until you lose the weight or feel better.     -If you notice that you have taken more than 3 extra doses in 1 week then please call and let us know. -If at any time you feel that you are retaining fluid, your medications are not working, or you feel ill in anyway, then please call us for either same day appointment or the next day, and for instructions. Our goal is to keep you out of the emergency room and the hospital and we have ways to do it. You just need to call us in a timely manner.     -If you become sick for other reasons, and notice that you are not urinating as much, the urine is very dark, you have significant diarrhea or vomiting, then please DO NOT take your water pill and CALL US immediately.

## 2020-08-28 ENCOUNTER — HOSPITAL ENCOUNTER (OUTPATIENT)
Age: 70
Discharge: HOME | End: 2020-08-30
Payer: MEDICARE

## 2020-08-28 LAB
ANION GAP SERPL CALCULATED.3IONS-SCNC: 11 MMOL/L (ref 7–16)
BUN BLDV-MCNC: 19 MG/DL (ref 8–23)
CALCIUM SERPL-MCNC: 9.1 MG/DL (ref 8.6–10.2)
CHLORIDE BLD-SCNC: 100 MMOL/L (ref 98–107)
CO2: 26 MMOL/L (ref 22–29)
CREAT SERPL-MCNC: 0.9 MG/DL (ref 0.7–1.2)
GFR AFRICAN AMERICAN: >60
GFR NON-AFRICAN AMERICAN: >60 ML/MIN/1.73
GLUCOSE BLD-MCNC: 101 MG/DL (ref 74–99)
INR BLD: 2.9
POTASSIUM SERPL-SCNC: 5.4 MMOL/L (ref 3.5–5)
PRO-BNP: 4332 PG/ML (ref 0–125)
PROTHROMBIN TIME: 33.1 SEC (ref 9.3–12.4)
SODIUM BLD-SCNC: 137 MMOL/L (ref 132–146)

## 2020-08-28 PROCEDURE — 83880 ASSAY OF NATRIURETIC PEPTIDE: CPT

## 2020-08-28 PROCEDURE — 85610 PROTHROMBIN TIME: CPT

## 2020-08-28 PROCEDURE — 80048 BASIC METABOLIC PNL TOTAL CA: CPT

## 2020-09-03 ENCOUNTER — HOSPITAL ENCOUNTER (OUTPATIENT)
Age: 70
Discharge: HOME | End: 2020-09-05
Payer: MEDICARE

## 2020-09-03 LAB
INR BLD: 2.8
PRO-BNP: 4928 PG/ML (ref 0–125)
PROTHROMBIN TIME: 32.1 SEC (ref 9.3–12.4)

## 2020-09-03 PROCEDURE — 83880 ASSAY OF NATRIURETIC PEPTIDE: CPT

## 2020-09-03 PROCEDURE — 85610 PROTHROMBIN TIME: CPT

## 2020-09-22 ENCOUNTER — TELEPHONE (OUTPATIENT)
Dept: CARDIOTHORACIC SURGERY | Age: 70
End: 2020-09-22

## 2020-09-22 NOTE — TELEPHONE ENCOUNTER
Pt informed Dr Taj Tapia would like echo done prior to ov on 10/20. Pt stated he will have Dr Jeff Jade do the echo at his office. Aware he needs to bring disc with him to ov.

## 2020-09-28 ENCOUNTER — OFFICE VISIT (OUTPATIENT)
Dept: CARDIOLOGY CLINIC | Age: 70
End: 2020-09-28
Payer: MEDICARE

## 2020-09-28 VITALS
WEIGHT: 135 LBS | DIASTOLIC BLOOD PRESSURE: 60 MMHG | HEIGHT: 70 IN | HEART RATE: 70 BPM | SYSTOLIC BLOOD PRESSURE: 90 MMHG | RESPIRATION RATE: 16 BRPM | BODY MASS INDEX: 19.33 KG/M2

## 2020-09-28 PROCEDURE — 93000 ELECTROCARDIOGRAM COMPLETE: CPT | Performed by: INTERNAL MEDICINE

## 2020-09-28 PROCEDURE — 3017F COLORECTAL CA SCREEN DOC REV: CPT | Performed by: INTERNAL MEDICINE

## 2020-09-28 PROCEDURE — G8420 CALC BMI NORM PARAMETERS: HCPCS | Performed by: INTERNAL MEDICINE

## 2020-09-28 PROCEDURE — 1036F TOBACCO NON-USER: CPT | Performed by: INTERNAL MEDICINE

## 2020-09-28 PROCEDURE — G8427 DOCREV CUR MEDS BY ELIG CLIN: HCPCS | Performed by: INTERNAL MEDICINE

## 2020-09-28 PROCEDURE — 99214 OFFICE O/P EST MOD 30 MIN: CPT | Performed by: INTERNAL MEDICINE

## 2020-09-28 PROCEDURE — 4040F PNEUMOC VAC/ADMIN/RCVD: CPT | Performed by: INTERNAL MEDICINE

## 2020-09-28 PROCEDURE — 1123F ACP DISCUSS/DSCN MKR DOCD: CPT | Performed by: INTERNAL MEDICINE

## 2020-09-28 ASSESSMENT — ENCOUNTER SYMPTOMS
BACK PAIN: 0
VOMITING: 0
CONSTIPATION: 0
SHORTNESS OF BREATH: 1
BLOOD IN STOOL: 0
NAUSEA: 0
COUGH: 0
ABDOMINAL PAIN: 0
DIARRHEA: 0
WHEEZING: 0

## 2020-09-28 NOTE — PROGRESS NOTES
OUTPATIENT CARDIOLOGY FOLLOW-UP    HPI:    Name: Ana Laura Bass    Age: 78 y.o. Primary Care Physician: Hawk Hooper DO    Date of Service: 9/28/2020    Chief Complaint:   Chief Complaint   Patient presents with    Coronary Artery Disease     1 mo follow up     Atrial Fibrillation     coumadin therapy         History of present illness : 42-year-old ex-smoker male who seen today for 1 month follow-up visit after recent hospitalization. He is accompanied by his wife. He has history of hypertension, hyperlipidemia, gout, anxiety and depression and status post left carotid stenting. He was seen in consultation by Dr. Cote Service at the hospital on 8/14/2020 due to acute systolic and diastolic CHF. He was found to have ischemic cardiomyopathy with severe systolic dysfunction. Echocardiogram done on 8/14/2020 revealed severely dilated left atrium, mildly dilated left ventricle with mild left ventricular hypertrophy with an ejection fraction of 15-20%. There was apical clot noted. There was moderate to severe mitral regurgitation, moderate tricuspid regurgitation and grade 2 diastolic dysfunction. Patient underwent left heart catheterization on 8/18/2020 done by me. He was found to have 80% distal left main stenosis, calcified LAD with 80% ostial stenosis, 90% discrete mid stenosis, 60-70% discrete mid stenosis, 80 to 90% ostial second diagonal stenosis, collaterals from the LAD to the RCA, 100% proximal left circumflex 100% mid RCA with 80-90% proximal RV marginal branch stenosis. He was seen by CT surgery and was scheduled to be seen 2 months after hospitalization. he was discharged home on medical therapy and wearing LifeVest.  He has not been active. He uses a cane to ambulate. He has been running low blood pressure of /40-60 mmHg. He denies dizziness or syncope.   He denies chest discomfort but complains of dyspnea on exertion that he attributes to wearing the LifeVest.  He sleeps on 2 pillows and denies PND or lower extremity edema. He is concerned about having a CABG. He is scheduled to undergo an echocardiogram on October 9, 2020. EKG done today revealed sinus rhythm at 70 bpm with borderline first-degree AV block, left enlargement, left anterior fascicular block and right bundle branch block. Review of Systems:   Review of Systems   Constitutional: Positive for fatigue. Negative for chills and fever. HENT: Negative for nosebleeds. Respiratory: Positive for shortness of breath. Negative for cough and wheezing. Gastrointestinal: Negative for abdominal pain, blood in stool, constipation, diarrhea, nausea and vomiting. Genitourinary: Negative for dysuria and hematuria. Musculoskeletal: Negative for back pain, joint swelling and myalgias. Neurological: Negative for syncope and light-headedness. Psychiatric/Behavioral: The patient is not nervous/anxious.            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,     CAD (coronary artery disease)     Gout     HFrEF (heart failure with reduced ejection fraction) ([address] 08/19/2020 8/13/2020- echo- LVEF 15-20%, stage II DD, RVSF low normal, moderate-severe MR, moderate TR, moderate AR    HFrEF (heart failure with reduced ejection fraction) ([address]     History of cardiovascular stress test 07/2015    Lexiscan stress test    HLD (hyperlipidemia)     Hypertension     Ischemic cardiomyopathy     Kidney stone     stent/    NSVT (nonsustained ventricular tachycardia) (HCC)     Osteoarthritis     PAD (peripheral artery disease) ([address]     Valvular heart disease        Past Surgical History:  Past Surgical History:   Procedure Laterality Date    CARDIAC CATHETERIZATION  08/18/2020    LITHOTRIPSY      MANDIBLE SURGERY      OTHER SURGICAL HISTORY Left 5/8/2014    EXCISION OF OLECRANON BURSA    OTHER SURGICAL HISTORY  02/08/2018    Laparoscopic transabdominal right inguinal hernia repair with mesk and Excision of Lipoma of the Cord    TONSILLECTOMY         Family History:  Family History   Problem Relation Age of Onset    Heart Disease Mother     Stroke Mother     Coronary Art Dis Father        Social History:  Social History     Socioeconomic History    Marital status: Single     Spouse name: Not on file    Number of children: Not on file    Years of education: Not on file    Highest education level: Not on file   Occupational History    Not on file   Social Needs    Financial resource strain: Not on file    Food insecurity     Worry: Not on file     Inability: Not on file    Transportation needs     Medical: Not on file     Non-medical: Not on file   Tobacco Use    Smoking status: Former Smoker     Packs/day: 0.25     Years: 50.00     Pack years: 12.50     Types: Cigarettes     Last attempt to quit: 2015     Years since quittin.4    Smokeless tobacco: Never Used   Substance and Sexual Activity    Alcohol use: Not Currently    Drug use: Never    Sexual activity: Not on file   Lifestyle    Physical activity     Days per week: Not on file     Minutes per session: Not on file    Stress: Not on file   Relationships    Social connections     Talks on phone: Not on file     Gets together: Not on file     Attends Yazidism service: Not on file     Active member of club or organization: Not on file     Attends meetings of clubs or organizations: Not on file     Relationship status: Not on file    Intimate partner violence     Fear of current or ex partner: Not on file     Emotionally abused: Not on file     Physically abused: Not on file     Forced sexual activity: Not on file   Other Topics Concern    Not on file   Social History Narrative    Drinks occ coffee.         Allergies:  No Known Allergies    Current Medications:  Current Outpatient Medications   Medication Sig Dispense Refill    sacubitril-valsartan (ENTRESTO) 24-26 MG per tablet Take 1 tablet by mouth nightly 60 tablet 3    bumetanide (BUMEX) 1 MG tablet Take 1 tablet by mouth daily as needed (weight gain, shortness of breath, swelling) 30 tablet 3    warfarin (COUMADIN) 5 MG tablet Take 1 tablet by mouth daily Need INR checked Monday 30 tablet 3    aspirin 81 MG chewable tablet Take 1 tablet by mouth daily 30 tablet 3    atorvastatin (LIPITOR) 40 MG tablet Take 1 tablet by mouth nightly 30 tablet 3    metoprolol succinate (TOPROL XL) 25 MG extended release tablet Take 1 tablet by mouth daily 30 tablet 3    citalopram (CELEXA) 10 MG tablet Take 10 mg by mouth daily      LORazepam (ATIVAN) 0.5 MG tablet Take 0.5 mg by mouth every 6 hours as needed for Anxiety.  allopurinol (ZYLOPRIM) 300 MG tablet TAKE ONE TABLET BY MOUTH EVERY DAY 30 tablet 5     No current facility-administered medications for this visit. Physical Exam:  Ht 5' 9.5\" (1.765 m)   Wt 135 lb (61.2 kg)   BMI 19.65 kg/m²   Wt Readings from Last 3 Encounters:   09/28/20 135 lb (61.2 kg)   08/25/20 139 lb 9.6 oz (63.3 kg)   08/21/20 134 lb (60.8 kg)       Physical Exam  Constitutional:       General: He is not in acute distress. Appearance: He is well-developed. HENT:      Head: Normocephalic and atraumatic. Neck:      Musculoskeletal: Neck supple. Vascular: No JVD. Cardiovascular:      Rate and Rhythm: Normal rate and regular rhythm. Heart sounds: No murmur. No friction rub. No gallop. Pulmonary:      Breath sounds: Normal breath sounds. No wheezing or rales. Chest:      Chest wall: No tenderness. Abdominal:      General: Bowel sounds are normal. There is no distension. Palpations: Abdomen is soft. There is no mass. Tenderness: There is no abdominal tenderness. Comments: No abdominal bruit. Musculoskeletal:      Right lower leg: Edema present. Left lower leg: Edema present. Comments: 1+ bilateral lower extremity edema.    Skin:     General: Skin is warm and dry. Neurological:      Mental Status: He is alert and oriented to person, place, and time.            Laboratory Tests:  Lab Results   Component Value Date    CREATININE 0.9 08/28/2020    BUN 19 08/28/2020     08/28/2020    K 5.4 (H) 08/28/2020     08/28/2020    CO2 26 08/28/2020     Lab Results   Component Value Date    MG 1.7 08/19/2020     Lab Results   Component Value Date    WBC 7.2 08/20/2020    HGB 11.1 (L) 08/20/2020    HCT 34.0 (L) 08/20/2020    MCV 95.8 08/20/2020     08/20/2020     Lab Results   Component Value Date    ALT 73 (H) 08/14/2020    AST 51 (H) 08/14/2020    ALKPHOS 137 (H) 08/14/2020    BILITOT 0.9 08/14/2020     Lab Results   Component Value Date    CKTOTAL 105 08/22/2015    CKMB 1.7 08/22/2015    TROPONINI 0.05 (H) 08/14/2020    TROPONINI 0.05 (H) 08/13/2020    TROPONINI <0.01 08/22/2015     Lab Results   Component Value Date    INR 2.8 09/03/2020    INR 2.9 08/28/2020    INR 1.4 08/24/2020    PROTIME 32.1 (H) 09/03/2020    PROTIME 33.1 (H) 08/28/2020    PROTIME 15.6 (H) 08/24/2020     Lab Results   Component Value Date    TSH 2.190 12/06/2017     Lab Results   Component Value Date    LABA1C 6.0 (H) 08/15/2020     No results found for: EAG  Lab Results   Component Value Date    CHOL 182 08/14/2020    CHOL 145 06/23/2020    CHOL 150 12/10/2019     Lab Results   Component Value Date    TRIG 64 08/14/2020    TRIG 44 06/23/2020    TRIG 45 12/10/2019     Lab Results   Component Value Date    HDL 55 08/14/2020    HDL 66 06/23/2020    HDL 72 12/10/2019     Lab Results   Component Value Date    LDLCALC 114 (H) 08/14/2020    LDLCALC 70 06/23/2020    LDLCALC 69 12/10/2019     Lab Results   Component Value Date    LABVLDL 13 08/14/2020    LABVLDL 9 06/23/2020    LABVLDL 9 12/10/2019           ASSESSMENT / PLAN:    -Ischemic cardiomyopathy with severe systolic dysfunction/grade 2 diastolic dysfunction and apical clot: Clinically compensated except for mild lower extremity edema. He is on warfarin due to apical clot. -CAD with severe triple-vessel CAD: No angina.  -Hyperlipidemia: On atorvastatin.  -Asymptomatic hypotension.  -Status post left carotid stent.  -Prediabetes.   -Anemia.  -History of gout. -Anxiety and depression. We will continue current cardiac medications. Patient advised to keep monitoring his blood pressure and to hold Entresto if symptomatic hypotension. Low-salt diet. Follow-up apical clot by echocardiogram scheduled on 10/9/2020 and follow-up ejection fraction. Patient is scheduled to see Dr. Krishan Ramos. Patient is reluctant to undergo CABG. May consider PCI of distal left main and mid LAD/diagonal branch using Impella support  Continue wearing LifeVest for now. Will follow-up at the office in 3 months. The patient's current medication list, allergies, problem list and results of all previously ordered testing were reviewed at today's visit. {  Luis Carlos Murphy MD , [address] [facility].   [facility]) Cardiology

## 2020-10-01 ENCOUNTER — HOSPITAL ENCOUNTER (OUTPATIENT)
Age: 70
Discharge: HOME | End: 2020-10-03
Payer: MEDICARE

## 2020-10-01 LAB
INR BLD: 2.3
PROTHROMBIN TIME: 25.6 SEC (ref 9.3–12.4)

## 2020-10-01 PROCEDURE — 85610 PROTHROMBIN TIME: CPT

## 2020-10-27 ENCOUNTER — INITIAL CONSULT (OUTPATIENT)
Dept: CARDIOTHORACIC SURGERY | Age: 70
End: 2020-10-27
Payer: MEDICARE

## 2020-10-27 VITALS
SYSTOLIC BLOOD PRESSURE: 95 MMHG | HEIGHT: 69 IN | DIASTOLIC BLOOD PRESSURE: 61 MMHG | BODY MASS INDEX: 19.99 KG/M2 | HEART RATE: 65 BPM | WEIGHT: 135 LBS

## 2020-10-27 PROCEDURE — G8420 CALC BMI NORM PARAMETERS: HCPCS | Performed by: THORACIC SURGERY (CARDIOTHORACIC VASCULAR SURGERY)

## 2020-10-27 PROCEDURE — 1036F TOBACCO NON-USER: CPT | Performed by: THORACIC SURGERY (CARDIOTHORACIC VASCULAR SURGERY)

## 2020-10-27 PROCEDURE — G8427 DOCREV CUR MEDS BY ELIG CLIN: HCPCS | Performed by: THORACIC SURGERY (CARDIOTHORACIC VASCULAR SURGERY)

## 2020-10-27 PROCEDURE — G8484 FLU IMMUNIZE NO ADMIN: HCPCS | Performed by: THORACIC SURGERY (CARDIOTHORACIC VASCULAR SURGERY)

## 2020-10-27 PROCEDURE — 3017F COLORECTAL CA SCREEN DOC REV: CPT | Performed by: THORACIC SURGERY (CARDIOTHORACIC VASCULAR SURGERY)

## 2020-10-27 PROCEDURE — 1123F ACP DISCUSS/DSCN MKR DOCD: CPT | Performed by: THORACIC SURGERY (CARDIOTHORACIC VASCULAR SURGERY)

## 2020-10-27 PROCEDURE — 99214 OFFICE O/P EST MOD 30 MIN: CPT | Performed by: THORACIC SURGERY (CARDIOTHORACIC VASCULAR SURGERY)

## 2020-10-27 PROCEDURE — 4040F PNEUMOC VAC/ADMIN/RCVD: CPT | Performed by: THORACIC SURGERY (CARDIOTHORACIC VASCULAR SURGERY)

## 2020-10-27 NOTE — PROGRESS NOTES
Subjective:      Patient ID: Ana Barba is a 69 y.o. male. Chief Complaint   Patient presents with    Consultation     hosp f/u review echo         HPI  Mr. Bharath Delgado is a 76-year-old male presenting to office to discuss possibility of surgical revascularization of his CAD as well as for his mod-severe MR. He was last seen in the hospital during his admission for CHF after c/o SOB and swelling for a month. During that time he was found to have a significantly reduced EF of 13% as well as mod-severe MR. He also underwent a coronary catheterization which revealed MVCAD. It was recommended that he continue medical HF therapy and to repeat the echo in 2 months to see if he has any myocardial recovery, thus allowing him to be a candidate for surgery. He recently underwent a repeat echo and now returns to office to discuss the above. He continues to report SOB, but denies any CP, palpitations, lightheadedness, or dizziness. Review of Systems  Constitutional: Denies fevers, chills, or weight loss. HEENT: Denies visual changes or hearing loss. Heart: As per HPI. Lungs: Denies shortness of breath, cough, or wheezing. Gastrointestinal: Denies nausea, vomiting, constipation, or diarrhea. Genitourinary: dysuria or hematuria. Psychiatric: Patient denies anxiety or depression. Neurologic: Patient denies weakness of the extremities, dizziness, or headaches. All other ROS checked and found to be negative. Objective:   Physical Exam  General Appearance: Pleasant 78y.o. year old male who appears stated age. Communicates well, no acute distress. HEENT: Head is normocephalic, atraumatic. EOMs intact, PERRL. Trachea midline. Lungs: Normal respiratory rate and normal effort. He is not in respiratory distress. Breath sounds clear to auscultation. No wheezes. Heart: Normal rate. Regular rhythm. S1 normal and S2 normal. Positive for murmur. Chest: Symmetric chest wall expansion.    Extremities: Normal range of motion. Neurological: Patient is alert and oriented to person, place and time. Patient has normal reflexes. Skin: Warm and dry. Abdomen: Abdomen is soft and non-distended. Bowel sounds are normal. There is no abdominal tenderness tenderness. There is no guarding. There is no mass. Pulses: Distal pulses are intact. Skin: Warm and dry without lesions.       A/P    68 yo male with ICM, moderate MR, RV dilation   EF remains low, moderate MR, RV remains dilated  Pt remains not a candidate for surgery  Recommend PCI/medical therapy and possible MitraClip if MR does not improve

## 2020-10-28 ENCOUNTER — TELEPHONE (OUTPATIENT)
Dept: ADMINISTRATIVE | Age: 70
End: 2020-10-28

## 2020-10-28 NOTE — TELEPHONE ENCOUNTER
Pt called in for advice, saw dr Jarret Posadas yesterday, who stated that pt is not a candidate for open heart surg, pt is concerned with the vest that he wears, says that Jarret Posadas told him to check with Dr Sean Dennison for other options, call pt back at [phone]

## 2020-11-02 ENCOUNTER — TELEPHONE (OUTPATIENT)
Dept: CARDIOTHORACIC SURGERY | Age: 70
End: 2020-11-02

## 2020-11-02 NOTE — TELEPHONE ENCOUNTER
Please schedule pt with a f/u with Dr Sonny Ramsey. Pt had ov with Dr Joseline Saleh. Not a surgical candidate.

## 2020-11-12 ENCOUNTER — OFFICE VISIT (OUTPATIENT)
Dept: CARDIOLOGY CLINIC | Age: 70
End: 2020-11-12
Payer: MEDICARE

## 2020-11-12 VITALS
RESPIRATION RATE: 14 BRPM | BODY MASS INDEX: 20.04 KG/M2 | DIASTOLIC BLOOD PRESSURE: 60 MMHG | SYSTOLIC BLOOD PRESSURE: 98 MMHG | WEIGHT: 140 LBS | HEIGHT: 70 IN | HEART RATE: 67 BPM

## 2020-11-12 PROCEDURE — 1123F ACP DISCUSS/DSCN MKR DOCD: CPT | Performed by: INTERNAL MEDICINE

## 2020-11-12 PROCEDURE — 3017F COLORECTAL CA SCREEN DOC REV: CPT | Performed by: INTERNAL MEDICINE

## 2020-11-12 PROCEDURE — G8427 DOCREV CUR MEDS BY ELIG CLIN: HCPCS | Performed by: INTERNAL MEDICINE

## 2020-11-12 PROCEDURE — G8420 CALC BMI NORM PARAMETERS: HCPCS | Performed by: INTERNAL MEDICINE

## 2020-11-12 PROCEDURE — 93000 ELECTROCARDIOGRAM COMPLETE: CPT | Performed by: INTERNAL MEDICINE

## 2020-11-12 PROCEDURE — G8484 FLU IMMUNIZE NO ADMIN: HCPCS | Performed by: INTERNAL MEDICINE

## 2020-11-12 PROCEDURE — 99214 OFFICE O/P EST MOD 30 MIN: CPT | Performed by: INTERNAL MEDICINE

## 2020-11-12 PROCEDURE — 4040F PNEUMOC VAC/ADMIN/RCVD: CPT | Performed by: INTERNAL MEDICINE

## 2020-11-12 PROCEDURE — 1036F TOBACCO NON-USER: CPT | Performed by: INTERNAL MEDICINE

## 2020-11-12 ASSESSMENT — ENCOUNTER SYMPTOMS
VOMITING: 0
ABDOMINAL PAIN: 0
WHEEZING: 0
BACK PAIN: 0
NAUSEA: 0
SHORTNESS OF BREATH: 0
COUGH: 0
CONSTIPATION: 0
DIARRHEA: 0
BLOOD IN STOOL: 0

## 2020-11-12 NOTE — PROGRESS NOTES
OUTPATIENT CARDIOLOGY FOLLOW-UP    HPI:    Name: Vicki Murillo    Age: 69 y.o. Primary Care Physician: Manish Holman DO    Date of Service: 11/12/2020    Chief Complaint:   Chief Complaint   Patient presents with    Cardiomyopathy     F/U OV- pt states no cardiac complaints        History of present illness : 67-year-old ex-smoker male who comes today for follow-up visit accompanied by his wife. He has history of hypertension, hyperlipidemia, gout, anxiety and depression and status post left carotid stenting. He was seen in consultation by Dr. Elizabeth Reddy at the hospital on 8/14/2020 due to acute systolic and diastolic CHF. He was found to have ischemic cardiomyopathy with severe systolic dysfunction. Echocardiogram done on 8/14/2020 revealed severely dilated left atrium, mildly dilated left ventricle with mild left ventricular hypertrophy with an ejection fraction of 15-20%. There was apical clot noted. There was moderate to severe mitral regurgitation, moderate tricuspid regurgitation and grade 2 diastolic dysfunction. Patient underwent left heart catheterization on 8/18/2020 done by me. He was found to have 80% distal left main stenosis, calcified LAD with 80% ostial stenosis, 90% discrete mid stenosis, 60-70% discrete mid stenosis, 80 to 90% ostial second diagonal stenosis, collaterals from the LAD to the RCA, 100% proximal left circumflex 100% mid RCA with 80-90% proximal RV marginal branch stenosis. He was discharged home on medical therapy and wearing LifeVest.  Patient was seen by Hugh Ramírez and was felt not to be candidate for CABG. Patient has not been active and has been using his cane to ambulate. He states he is doing fine. He denies chest discomfort or dyspnea at his level of activity. He denies palpitations, dizziness or syncope. He denies orthopnea, PND or lower extremity edema.   He denies shocks from his LifeVest.    Review of Systems:   Review of Systems   Constitutional: Positive for fatigue. Negative for chills and fever. HENT: Negative for nosebleeds. Respiratory: Negative for cough, shortness of breath and wheezing. Gastrointestinal: Negative for abdominal pain, blood in stool, constipation, diarrhea, nausea and vomiting. Genitourinary: Negative for dysuria and hematuria. Musculoskeletal: Negative for back pain, joint swelling and myalgias. Neurological: Negative for syncope and light-headedness. Psychiatric/Behavioral: The patient is not nervous/anxious.            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,     CAD (coronary artery disease)     Gout     HFrEF (heart failure with reduced ejection fraction) ([facility] [address] 08/19/2020 8/13/2020- echo- LVEF 15-20%, stage II DD, RVSF low normal, moderate-severe MR, moderate TR, moderate MA    HFrEF (heart failure with reduced ejection fraction) ([address]     History of cardiovascular stress test 07/2015    Lexiscan stress test    HLD (hyperlipidemia)     Hypertension     Ischemic cardiomyopathy     Kidney stone     stent/    [facility]onsust[facility] ventricular [facility]cardia) [facility]     Osteoarthritis     PAD (peripheral artery disease) ([address]     Valvular heart disease        Past Surgical History:  Past Surgical History:   Procedure Laterality Date    CARDIAC CATHETERIZATION  08/18/2020    LITHOTRIPSY      MANDIBLE SURGERY      OTHER SURGICAL HISTORY Left 5/8/2014    EXCISION OF OLECRANON BURSA    OTHER SURGICAL HISTORY  02/08/2018    Laparoscopic transabdominal right inguinal hernia repair with mesk and Excision of Lipoma of the Cord    TONSILLECTOMY         Family History:  Family History   Problem Relation Age of Onset    Heart Disease Mother     Stroke Mother     Coronary Art Dis Father        Social History:  Social History     Socioeconomic History    Marital status: Single     Spouse name: Not on file    Number of children: Not on file    Years of education: Not on file    Highest education level: Not on file   Occupational History    Not on file   Social Needs    Financial resource strain: Not on file    Food insecurity     Worry: Not on file     Inability: Not on file    Transportation needs     Medical: Not on file     Non-medical: Not on file   Tobacco Use    Smoking status: Former Smoker     Packs/day: 0.25     Years: 50.00     Pack years: 12.50     Types: Cigarettes     Last attempt to quit: 2015     Years since quittin.5    Smokeless tobacco: Never Used   Substance and Sexual Activity    Alcohol use: Not Currently    Drug use: Never    Sexual activity: Not on file   Lifestyle    Physical activity     Days per week: Not on file     Minutes per session: Not on file    Stress: Not on file   Relationships    Social connections     Talks on phone: Not on file     Gets together: Not on file     Attends Oriental orthodox service: Not on file     Active member of club or organization: Not on file     Attends meetings of clubs or organizations: Not on file     Relationship status: Not on file    Intimate partner violence     Fear of current or ex partner: Not on file     Emotionally abused: Not on file     Physically abused: Not on file     Forced sexual activity: Not on file   Other Topics Concern    Not on file   Social History Narrative    Drinks occ coffee.         Allergies:  No Known Allergies    Current Medications:  Current Outpatient Medications   Medication Sig Dispense Refill    bumetanide (BUMEX) 1 MG tablet Take 1 tablet by mouth daily as needed (weight gain, shortness of breath, swelling) 30 tablet 3    warfarin (COUMADIN) 5 MG tablet Take 1 tablet by mouth daily Need INR checked Monday 30 tablet 3    aspirin 81 MG chewable tablet Take 1 tablet by mouth daily 30 tablet 3    atorvastatin (LIPITOR) 40 MG tablet Take 1 tablet by mouth nightly 30 tablet 3    metoprolol succinate (TOPROL XL) 25 MG extended release tablet Take 1 tablet by mouth daily 30 tablet 3    LORazepam (ATIVAN) 0.5 MG tablet Take 0.5 mg by mouth every 6 hours as needed for Anxiety.  allopurinol (ZYLOPRIM) 300 MG tablet TAKE ONE TABLET BY MOUTH EVERY DAY 30 tablet 5    sacubitril-valsartan (ENTRESTO) 24-26 MG per tablet Take 1 tablet by mouth nightly (Patient not taking: Reported on 11/12/2020) 60 tablet 3    citalopram (CELEXA) 10 MG tablet Take 10 mg by mouth daily       No current facility-administered medications for this visit. Physical Exam:  BP 98/60   Pulse 67   Resp 14   Ht 5' 9.5\" (1.765 m)   Wt 140 lb (63.5 kg)   BMI 20.38 kg/m²   Wt Readings from Last 3 Encounters:   11/12/20 140 lb (63.5 kg)   10/27/20 135 lb (61.2 kg)   09/28/20 135 lb (61.2 kg)       Physical Exam  Constitutional:       General: He is not in acute distress. Appearance: He is well-developed. HENT:      Head: Normocephalic and atraumatic. Neck:      Musculoskeletal: Neck supple. Vascular: No carotid bruit or JVD. Cardiovascular:      Rate and Rhythm: Normal rate and regular rhythm. Heart sounds: No murmur. No friction rub. No gallop. Comments: Distant heart sounds. Pulmonary:      Breath sounds: No wheezing or rales. Comments: Decreased air entry bilaterally with no wheezing or crackles. Chest:      Chest wall: No tenderness. Abdominal:      General: Bowel sounds are normal. There is no distension. Palpations: Abdomen is soft. There is no mass. Tenderness: There is no abdominal tenderness. Comments: No abdominal bruit. Musculoskeletal:      Right lower leg: No edema. Left lower leg: No edema. Skin:     General: Skin is warm and dry. Neurological:      Mental Status: He is alert and oriented to person, place, and time.            Laboratory Tests:  Lab Results   Component Value Date    CREATININE 0.9 08/28/2020    BUN 19 08/28/2020     08/28/2020    K 5.4 (H) 08/28/2020     08/28/2020 CO2 26 08/28/2020     Lab Results   Component Value Date    MG 1.7 08/19/2020     Lab Results   Component Value Date    WBC 7.2 08/20/2020    HGB 11.1 (L) 08/20/2020    HCT 34.0 (L) 08/20/2020    MCV 95.8 08/20/2020     08/20/2020     Lab Results   Component Value Date    ALT 73 (H) 08/14/2020    AST 51 (H) 08/14/2020    ALKPHOS 137 (H) 08/14/2020    BILITOT 0.9 08/14/2020     Lab Results   Component Value Date    CKTOTAL 105 08/22/2015    CKMB 1.7 08/22/2015    TROPONINI 0.05 (H) 08/14/2020    TROPONINI 0.05 (H) 08/13/2020    TROPONINI <0.01 08/22/2015     Lab Results   Component Value Date    INR 2.3 10/01/2020    INR 2.8 09/03/2020    INR 2.9 08/28/2020    PROTIME 25.6 (H) 10/01/2020    PROTIME 32.1 (H) 09/03/2020    PROTIME 33.1 (H) 08/28/2020     Lab Results   Component Value Date    TSH 2.190 12/06/2017     Lab Results   Component Value Date    LABA1C 6.0 (H) 08/15/2020     No results found for: EAG  Lab Results   Component Value Date    CHOL 182 08/14/2020    CHOL 145 06/23/2020    CHOL 150 12/10/2019     Lab Results   Component Value Date    TRIG 64 08/14/2020    TRIG 44 06/23/2020    TRIG 45 12/10/2019     Lab Results   Component Value Date    HDL 55 08/14/2020    HDL 66 06/23/2020    HDL 72 12/10/2019     Lab Results   Component Value Date    LDLCALC 114 (H) 08/14/2020    LDLCALC 70 06/23/2020    LDLCALC 69 12/10/2019     Lab Results   Component Value Date    LABVLDL 13 08/14/2020    LABVLDL 9 06/23/2020    LABVLDL 9 12/10/2019           ASSESSMENT / PLAN:  -Ischemic cardiomyopathy with severe systolic dysfunction/grade 2 diastolic dysfunction and apical clot: Clinically compensated. He is on warfarin due to apical clot. -CAD with severe triple-vessel CAD: No angina at his level of activity.  -Hyperlipidemia: On atorvastatin.  -Low normal blood pressure: Asymptomatic.  -Status post left carotid stent.  -Prediabetes.   -Anemia.  -History of gout. -Anxiety and depression.     We will continue current cardiac medications. I am reluctant to start him on Aldactone due to his low normal blood pressure. Will arrange for the patient to have an echocardiogram to follow-up his apical clot and to reassess his ejection fraction on medical therapy. Will continue wearing LifeVest for now. Patient and his wife were asked to think about having a second opinion at the [facility] clinic regarding CABG with probable mitral valve repair. Patient has refused CABG in the past.  They were informed about the high risk involved in undergoing PCI to the distal left main as well as to the mid LAD/diagonal branch. The patient's current medication list, allergies, problem list and results of all previously ordered testing were reviewed at today's visit. {  Deirdre Siu MD , [facility] [place]   [facility] Cardiology

## 2020-12-16 RX ORDER — BUMETANIDE 1 MG/1
TABLET ORAL
Qty: 30 TABLET | Refills: 6 | Status: SHIPPED
Start: 2020-12-16 | End: 2021-01-01

## 2020-12-17 ENCOUNTER — TELEPHONE (OUTPATIENT)
Dept: CARDIOLOGY | Age: 70
End: 2020-12-17

## 2020-12-18 ENCOUNTER — HOSPITAL ENCOUNTER (OUTPATIENT)
Dept: CARDIOLOGY | Age: 70
Discharge: HOME | End: 2020-12-18
Payer: MEDICARE

## 2020-12-18 LAB
LV EF: 35 %
LVEF MODALITY: NORMAL

## 2020-12-18 PROCEDURE — 2580000003 HC RX 258: Performed by: INTERNAL MEDICINE

## 2020-12-18 PROCEDURE — C8929 TTE W OR WO FOL WCON,DOPPLER: HCPCS

## 2020-12-18 PROCEDURE — 93306 TTE W/DOPPLER COMPLETE: CPT

## 2020-12-18 PROCEDURE — 6360000004 HC RX CONTRAST MEDICATION: Performed by: INTERNAL MEDICINE

## 2020-12-18 RX ORDER — SODIUM CHLORIDE 0.9 % (FLUSH) 0.9 %
10 SYRINGE (ML) INJECTION PRN
Status: DISCONTINUED | OUTPATIENT
Start: 2020-12-18 | End: 2020-12-19 | Stop reason: HOSPADM

## 2020-12-18 RX ADMIN — PERFLUTREN 1.1 MG: 6.52 INJECTION, SUSPENSION INTRAVENOUS at 08:49

## 2020-12-18 RX ADMIN — SODIUM CHLORIDE, PRESERVATIVE FREE 10 ML: 5 INJECTION INTRAVENOUS at 08:54

## 2021-01-01 ENCOUNTER — APPOINTMENT (OUTPATIENT)
Dept: CT IMAGING | Age: 71
DRG: 552 | End: 2021-01-01
Payer: MEDICARE

## 2021-01-01 ENCOUNTER — TELEPHONE (OUTPATIENT)
Dept: CARDIOLOGY CLINIC | Age: 71
End: 2021-01-01

## 2021-01-01 ENCOUNTER — TELEPHONE (OUTPATIENT)
Dept: NON INVASIVE DIAGNOSTICS | Age: 71
End: 2021-01-01

## 2021-01-01 ENCOUNTER — TELEPHONE (OUTPATIENT)
Dept: CARDIOLOGY | Age: 71
End: 2021-01-01

## 2021-01-01 ENCOUNTER — HOSPITAL ENCOUNTER (INPATIENT)
Age: 71
LOS: 1 days | Discharge: HOME HEALTH | DRG: 552 | End: 2021-04-10
Attending: EMERGENCY MEDICINE | Admitting: INTERNAL MEDICINE
Payer: MEDICARE

## 2021-01-01 ENCOUNTER — OFFICE VISIT (OUTPATIENT)
Dept: NON INVASIVE DIAGNOSTICS | Age: 71
End: 2021-01-01
Payer: MEDICARE

## 2021-01-01 ENCOUNTER — HOSPITAL ENCOUNTER (EMERGENCY)
Age: 71
Discharge: HOME | End: 2021-03-29
Attending: EMERGENCY MEDICINE
Payer: MEDICARE

## 2021-01-01 ENCOUNTER — HOSPITAL ENCOUNTER (OUTPATIENT)
Dept: CARDIAC CATH/INVASIVE PROCEDURES | Age: 71
Discharge: HOME | End: 2021-02-19
Payer: MEDICARE

## 2021-01-01 ENCOUNTER — NURSE ONLY (OUTPATIENT)
Dept: NON INVASIVE DIAGNOSTICS | Age: 71
End: 2021-01-01

## 2021-01-01 ENCOUNTER — APPOINTMENT (OUTPATIENT)
Dept: MRI IMAGING | Age: 71
DRG: 552 | End: 2021-01-01
Payer: MEDICARE

## 2021-01-01 ENCOUNTER — HOSPITAL ENCOUNTER (OUTPATIENT)
Age: 71
Discharge: HOME | End: 2021-02-17
Payer: MEDICARE

## 2021-01-01 ENCOUNTER — APPOINTMENT (OUTPATIENT)
Dept: GENERAL RADIOLOGY | Age: 71
End: 2021-01-01
Payer: MEDICARE

## 2021-01-01 ENCOUNTER — APPOINTMENT (OUTPATIENT)
Dept: CT IMAGING | Age: 71
End: 2021-01-01
Payer: MEDICARE

## 2021-01-01 ENCOUNTER — NURSE ONLY (OUTPATIENT)
Dept: NON INVASIVE DIAGNOSTICS | Age: 71
End: 2021-01-01
Payer: MEDICARE

## 2021-01-01 ENCOUNTER — ANESTHESIA (OUTPATIENT)
Dept: CARDIAC CATH/INVASIVE PROCEDURES | Age: 71
End: 2021-01-01

## 2021-01-01 ENCOUNTER — OFFICE VISIT (OUTPATIENT)
Dept: CARDIOLOGY CLINIC | Age: 71
End: 2021-01-01
Payer: MEDICARE

## 2021-01-01 ENCOUNTER — HOSPITAL ENCOUNTER (OUTPATIENT)
Dept: GENERAL RADIOLOGY | Age: 71
Discharge: HOME | End: 2021-02-21
Payer: MEDICARE

## 2021-01-01 ENCOUNTER — ANESTHESIA EVENT (OUTPATIENT)
Dept: CARDIAC CATH/INVASIVE PROCEDURES | Age: 71
End: 2021-01-01

## 2021-01-01 VITALS
BODY MASS INDEX: 20.14 KG/M2 | WEIGHT: 136 LBS | SYSTOLIC BLOOD PRESSURE: 108 MMHG | DIASTOLIC BLOOD PRESSURE: 68 MMHG | HEART RATE: 69 BPM | HEIGHT: 69 IN | RESPIRATION RATE: 20 BRPM

## 2021-01-01 VITALS
DIASTOLIC BLOOD PRESSURE: 70 MMHG | WEIGHT: 128 LBS | HEIGHT: 70 IN | HEART RATE: 72 BPM | BODY MASS INDEX: 18.32 KG/M2 | OXYGEN SATURATION: 93 % | SYSTOLIC BLOOD PRESSURE: 122 MMHG | RESPIRATION RATE: 16 BRPM

## 2021-01-01 VITALS
RESPIRATION RATE: 16 BRPM | HEART RATE: 74 BPM | TEMPERATURE: 98.6 F | BODY MASS INDEX: 19.59 KG/M2 | HEIGHT: 69 IN | DIASTOLIC BLOOD PRESSURE: 67 MMHG | WEIGHT: 132.28 LBS | OXYGEN SATURATION: 97 % | SYSTOLIC BLOOD PRESSURE: 108 MMHG

## 2021-01-01 VITALS
OXYGEN SATURATION: 98 % | WEIGHT: 127.2 LBS | RESPIRATION RATE: 20 BRPM | HEIGHT: 70 IN | HEART RATE: 73 BPM | BODY MASS INDEX: 18.21 KG/M2 | DIASTOLIC BLOOD PRESSURE: 40 MMHG | SYSTOLIC BLOOD PRESSURE: 92 MMHG

## 2021-01-01 VITALS
RESPIRATION RATE: 20 BRPM | TEMPERATURE: 98.5 F | HEART RATE: 83 BPM | DIASTOLIC BLOOD PRESSURE: 64 MMHG | OXYGEN SATURATION: 99 % | SYSTOLIC BLOOD PRESSURE: 115 MMHG

## 2021-01-01 VITALS — WEIGHT: 136 LBS | HEIGHT: 69 IN | BODY MASS INDEX: 20.14 KG/M2

## 2021-01-01 VITALS — DIASTOLIC BLOOD PRESSURE: 72 MMHG | SYSTOLIC BLOOD PRESSURE: 123 MMHG | OXYGEN SATURATION: 100 %

## 2021-01-01 DIAGNOSIS — I25.5 ISCHEMIC CARDIOMYOPATHY: Primary | ICD-10-CM

## 2021-01-01 DIAGNOSIS — Z95.810 BIVENTRICULAR IMPLANTABLE CARDIOVERTER-DEFIBRILLATOR (ICD) IN SITU: Primary | ICD-10-CM

## 2021-01-01 DIAGNOSIS — I51.9 HEART PROBLEM: Primary | ICD-10-CM

## 2021-01-01 DIAGNOSIS — M62.81 MUSCLE WEAKNESS OF EXTREMITY: Primary | ICD-10-CM

## 2021-01-01 DIAGNOSIS — I25.5 ISCHEMIC CARDIOMYOPATHY: ICD-10-CM

## 2021-01-01 DIAGNOSIS — I50.9 CONGESTIVE HEART FAILURE OF UNKNOWN ETIOLOGY (HCC): ICD-10-CM

## 2021-01-01 DIAGNOSIS — R20.2 PARESTHESIA: Primary | ICD-10-CM

## 2021-01-01 DIAGNOSIS — I25.10 CORONARY ARTERY DISEASE INVOLVING NATIVE CORONARY ARTERY OF NATIVE HEART WITHOUT ANGINA PECTORIS: Primary | ICD-10-CM

## 2021-01-01 DIAGNOSIS — I50.20 HFREF (HEART FAILURE WITH REDUCED EJECTION FRACTION) (HCC): ICD-10-CM

## 2021-01-01 DIAGNOSIS — Z01.818 PRE-OP TESTING: Primary | ICD-10-CM

## 2021-01-01 DIAGNOSIS — Z95.810 BIVENTRICULAR IMPLANTABLE CARDIOVERTER-DEFIBRILLATOR (ICD) IN SITU: ICD-10-CM

## 2021-01-01 DIAGNOSIS — R27.0 ATAXIA: ICD-10-CM

## 2021-01-01 LAB
ALBUMIN SERPL-MCNC: 4.3 G/DL (ref 3.5–5.2)
ALBUMIN SERPL-MCNC: 4.6 G/DL (ref 3.5–5.2)
ALP BLD-CCNC: 178 U/L (ref 40–129)
ALP BLD-CCNC: 178 U/L (ref 40–129)
ALT SERPL-CCNC: 22 U/L (ref 0–40)
ALT SERPL-CCNC: 23 U/L (ref 0–40)
ANION GAP SERPL CALCULATED.3IONS-SCNC: 11 MMOL/L (ref 7–16)
ANION GAP SERPL CALCULATED.3IONS-SCNC: 17 MMOL/L (ref 7–16)
ANION GAP SERPL CALCULATED.3IONS-SCNC: 8 MMOL/L (ref 7–16)
ANION GAP SERPL CALCULATED.3IONS-SCNC: 9 MMOL/L (ref 7–16)
APTT: 38.1 SEC (ref 24.5–35.1)
APTT: 47.4 SEC (ref 24.5–35.1)
AST SERPL-CCNC: 30 U/L (ref 0–39)
AST SERPL-CCNC: 35 U/L (ref 0–39)
BACTERIA: NORMAL /HPF
BASOPHILS ABSOLUTE: 0.02 E9/L (ref 0–0.2)
BASOPHILS RELATIVE PERCENT: 0.3 % (ref 0–2)
BILIRUB SERPL-MCNC: 0.7 MG/DL (ref 0–1.2)
BILIRUB SERPL-MCNC: 1.1 MG/DL (ref 0–1.2)
BILIRUBIN URINE: NEGATIVE
BLOOD, URINE: ABNORMAL
BUN BLDV-MCNC: 12 MG/DL (ref 8–23)
BUN BLDV-MCNC: 13 MG/DL (ref 8–23)
BUN BLDV-MCNC: 21 MG/DL (ref 8–23)
BUN BLDV-MCNC: 26 MG/DL (ref 8–23)
CALCIUM SERPL-MCNC: 10 MG/DL (ref 8.6–10.2)
CALCIUM SERPL-MCNC: 9.1 MG/DL (ref 8.6–10.2)
CALCIUM SERPL-MCNC: 9.8 MG/DL (ref 8.6–10.2)
CALCIUM SERPL-MCNC: 9.8 MG/DL (ref 8.6–10.2)
CHLORIDE BLD-SCNC: 100 MMOL/L (ref 98–107)
CHLORIDE BLD-SCNC: 104 MMOL/L (ref 98–107)
CHLORIDE BLD-SCNC: 94 MMOL/L (ref 98–107)
CHLORIDE BLD-SCNC: 96 MMOL/L (ref 98–107)
CLARITY: CLEAR
CO2: 24 MMOL/L (ref 22–29)
CO2: 25 MMOL/L (ref 22–29)
CO2: 27 MMOL/L (ref 22–29)
CO2: 31 MMOL/L (ref 22–29)
COLOR: YELLOW
CREAT SERPL-MCNC: 0.8 MG/DL (ref 0.7–1.2)
CREAT SERPL-MCNC: 0.8 MG/DL (ref 0.7–1.2)
CREAT SERPL-MCNC: 0.9 MG/DL (ref 0.7–1.2)
CREAT SERPL-MCNC: 1.1 MG/DL (ref 0.7–1.2)
EKG ATRIAL RATE: 75 BPM
EKG ATRIAL RATE: 77 BPM
EKG ATRIAL RATE: 80 BPM
EKG P AXIS: 20 DEGREES
EKG P AXIS: 56 DEGREES
EKG P AXIS: 77 DEGREES
EKG P-R INTERVAL: 146 MS
EKG P-R INTERVAL: 154 MS
EKG P-R INTERVAL: 156 MS
EKG Q-T INTERVAL: 452 MS
EKG Q-T INTERVAL: 460 MS
EKG Q-T INTERVAL: 482 MS
EKG QRS DURATION: 146 MS
EKG QRS DURATION: 156 MS
EKG QRS DURATION: 178 MS
EKG QTC CALCULATION (BAZETT): 504 MS
EKG QTC CALCULATION (BAZETT): 520 MS
EKG QTC CALCULATION (BAZETT): 555 MS
EKG R AXIS: -117 DEGREES
EKG R AXIS: -57 DEGREES
EKG R AXIS: -60 DEGREES
EKG T AXIS: 105 DEGREES
EKG T AXIS: 82 DEGREES
EKG T AXIS: 84 DEGREES
EKG VENTRICULAR RATE: 75 BPM
EKG VENTRICULAR RATE: 77 BPM
EKG VENTRICULAR RATE: 80 BPM
EOSINOPHILS ABSOLUTE: 0.02 E9/L (ref 0.05–0.5)
EOSINOPHILS ABSOLUTE: 0.08 E9/L (ref 0.05–0.5)
EOSINOPHILS ABSOLUTE: 0.25 E9/L (ref 0.05–0.5)
EOSINOPHILS RELATIVE PERCENT: 0.3 % (ref 0–6)
EOSINOPHILS RELATIVE PERCENT: 1.4 % (ref 0–6)
EOSINOPHILS RELATIVE PERCENT: 3.7 % (ref 0–6)
EPITHELIAL CELLS, UA: NORMAL /HPF
GFR AFRICAN AMERICAN: >60
GFR NON-AFRICAN AMERICAN: >60 ML/MIN/1.73
GLUCOSE BLD-MCNC: 114 MG/DL (ref 74–99)
GLUCOSE BLD-MCNC: 115 MG/DL (ref 74–99)
GLUCOSE BLD-MCNC: 122 MG/DL (ref 74–99)
GLUCOSE BLD-MCNC: 88 MG/DL (ref 74–99)
GLUCOSE URINE: NEGATIVE MG/DL
HCT VFR BLD CALC: 30.3 % (ref 37–54)
HCT VFR BLD CALC: 34.5 % (ref 37–54)
HCT VFR BLD CALC: 35.1 % (ref 37–54)
HCT VFR BLD CALC: 38 % (ref 37–54)
HEMOGLOBIN: 10.2 G/DL (ref 12.5–16.5)
HEMOGLOBIN: 11.1 G/DL (ref 12.5–16.5)
HEMOGLOBIN: 11.8 G/DL (ref 12.5–16.5)
HEMOGLOBIN: 12.2 G/DL (ref 12.5–16.5)
IMMATURE GRANULOCYTES #: 0.01 E9/L
IMMATURE GRANULOCYTES #: 0.02 E9/L
IMMATURE GRANULOCYTES #: 0.02 E9/L
IMMATURE GRANULOCYTES %: 0.1 % (ref 0–5)
IMMATURE GRANULOCYTES %: 0.3 % (ref 0–5)
IMMATURE GRANULOCYTES %: 0.3 % (ref 0–5)
INR BLD: 1.1
INR BLD: 1.4
INR BLD: 1.7
INR BLD: 2.9
INR BLD: 4.5
INR BLD: 5.2
INR BLD: 5.3
KETONES, URINE: NEGATIVE MG/DL
LEUKOCYTE ESTERASE, URINE: NEGATIVE
LYMPHOCYTES ABSOLUTE: 1.04 E9/L (ref 1.5–4)
LYMPHOCYTES ABSOLUTE: 1.17 E9/L (ref 1.5–4)
LYMPHOCYTES ABSOLUTE: 1.26 E9/L (ref 1.5–4)
LYMPHOCYTES RELATIVE PERCENT: 15.4 % (ref 20–42)
LYMPHOCYTES RELATIVE PERCENT: 15.5 % (ref 20–42)
LYMPHOCYTES RELATIVE PERCENT: 21.5 % (ref 20–42)
MAGNESIUM: 2 MG/DL (ref 1.6–2.6)
MAGNESIUM: 2.1 MG/DL (ref 1.6–2.6)
MAGNESIUM: 2.1 MG/DL (ref 1.6–2.6)
MCH RBC QN AUTO: 30.5 PG (ref 26–35)
MCH RBC QN AUTO: 30.9 PG (ref 26–35)
MCH RBC QN AUTO: 31.3 PG (ref 26–35)
MCH RBC QN AUTO: 32.2 PG (ref 26–35)
MCHC RBC AUTO-ENTMCNC: 32.1 % (ref 32–34.5)
MCHC RBC AUTO-ENTMCNC: 32.2 % (ref 32–34.5)
MCHC RBC AUTO-ENTMCNC: 33.6 % (ref 32–34.5)
MCHC RBC AUTO-ENTMCNC: 33.7 % (ref 32–34.5)
MCV RBC AUTO: 93.1 FL (ref 80–99.9)
MCV RBC AUTO: 95 FL (ref 80–99.9)
MCV RBC AUTO: 95.6 FL (ref 80–99.9)
MCV RBC AUTO: 96.1 FL (ref 80–99.9)
MONOCYTES ABSOLUTE: 0.39 E9/L (ref 0.1–0.95)
MONOCYTES ABSOLUTE: 0.5 E9/L (ref 0.1–0.95)
MONOCYTES ABSOLUTE: 0.58 E9/L (ref 0.1–0.95)
MONOCYTES RELATIVE PERCENT: 5.8 % (ref 2–12)
MONOCYTES RELATIVE PERCENT: 7.6 % (ref 2–12)
MONOCYTES RELATIVE PERCENT: 8.5 % (ref 2–12)
NEUTROPHILS ABSOLUTE: 3.97 E9/L (ref 1.8–7.3)
NEUTROPHILS ABSOLUTE: 4.99 E9/L (ref 1.8–7.3)
NEUTROPHILS ABSOLUTE: 5.79 E9/L (ref 1.8–7.3)
NEUTROPHILS RELATIVE PERCENT: 68 % (ref 43–80)
NEUTROPHILS RELATIVE PERCENT: 74.6 % (ref 43–80)
NEUTROPHILS RELATIVE PERCENT: 76.1 % (ref 43–80)
NITRITE, URINE: NEGATIVE
PDW BLD-RTO: 15.1 FL (ref 11.5–15)
PDW BLD-RTO: 15.2 FL (ref 11.5–15)
PDW BLD-RTO: 15.8 FL (ref 11.5–15)
PDW BLD-RTO: 15.8 FL (ref 11.5–15)
PH UA: 5.5 (ref 5–9)
PLATELET # BLD: 173 E9/L (ref 130–450)
PLATELET # BLD: 174 E9/L (ref 130–450)
PLATELET # BLD: 198 E9/L (ref 130–450)
PLATELET # BLD: 233 E9/L (ref 130–450)
PMV BLD AUTO: 10 FL (ref 7–12)
PMV BLD AUTO: 10.6 FL (ref 7–12)
PMV BLD AUTO: 11 FL (ref 7–12)
PMV BLD AUTO: 9.8 FL (ref 7–12)
POTASSIUM REFLEX MAGNESIUM: 4.3 MMOL/L (ref 3.5–5)
POTASSIUM SERPL-SCNC: 3.8 MMOL/L (ref 3.5–5)
POTASSIUM SERPL-SCNC: 4.3 MMOL/L (ref 3.5–5)
POTASSIUM SERPL-SCNC: 4.6 MMOL/L (ref 3.5–5)
PROTEIN UA: NEGATIVE MG/DL
PROTHROMBIN TIME: 12.8 SEC (ref 9.3–12.4)
PROTHROMBIN TIME: 14.7 SEC (ref 9.3–12.4)
PROTHROMBIN TIME: 18.8 SEC (ref 9.3–12.4)
PROTHROMBIN TIME: 31.3 SEC (ref 9.3–12.4)
PROTHROMBIN TIME: 52.5 SEC (ref 9.3–12.4)
PROTHROMBIN TIME: 56.2 SEC (ref 9.3–12.4)
PROTHROMBIN TIME: 58.2 SEC (ref 9.3–12.4)
RBC # BLD: 3.17 E12/L (ref 3.8–5.8)
RBC # BLD: 3.59 E12/L (ref 3.8–5.8)
RBC # BLD: 3.77 E12/L (ref 3.8–5.8)
RBC # BLD: 4 E12/L (ref 3.8–5.8)
RBC UA: NORMAL /HPF (ref 0–2)
SARS-COV-2: NOT DETECTED
SODIUM BLD-SCNC: 133 MMOL/L (ref 132–146)
SODIUM BLD-SCNC: 136 MMOL/L (ref 132–146)
SODIUM BLD-SCNC: 138 MMOL/L (ref 132–146)
SODIUM BLD-SCNC: 139 MMOL/L (ref 132–146)
SOURCE: NORMAL
SPECIFIC GRAVITY UA: 1.02 (ref 1–1.03)
TOTAL CK: 255 U/L (ref 20–200)
TOTAL PROTEIN: 7.3 G/DL (ref 6.4–8.3)
TOTAL PROTEIN: 7.7 G/DL (ref 6.4–8.3)
TROPONIN: <0.01 NG/ML (ref 0–0.03)
TROPONIN: <0.01 NG/ML (ref 0–0.03)
UROBILINOGEN, URINE: 0.2 E.U./DL
WBC # BLD: 5.4 E9/L (ref 4.5–11.5)
WBC # BLD: 5.9 E9/L (ref 4.5–11.5)
WBC # BLD: 6.7 E9/L (ref 4.5–11.5)
WBC # BLD: 7.6 E9/L (ref 4.5–11.5)
WBC UA: NORMAL /HPF (ref 0–5)

## 2021-01-01 PROCEDURE — 85610 PROTHROMBIN TIME: CPT

## 2021-01-01 PROCEDURE — 2580000003 HC RX 258: Performed by: NURSE ANESTHETIST, CERTIFIED REGISTERED

## 2021-01-01 PROCEDURE — 96372 THER/PROPH/DIAG INJ SC/IM: CPT

## 2021-01-01 PROCEDURE — 36415 COLL VENOUS BLD VENIPUNCTURE: CPT

## 2021-01-01 PROCEDURE — 85027 COMPLETE CBC AUTOMATED: CPT

## 2021-01-01 PROCEDURE — 97162 PT EVAL MOD COMPLEX 30 MIN: CPT

## 2021-01-01 PROCEDURE — 2500000003 HC RX 250 WO HCPCS

## 2021-01-01 PROCEDURE — 93005 ELECTROCARDIOGRAM TRACING: CPT | Performed by: NURSE PRACTITIONER

## 2021-01-01 PROCEDURE — C1894 INTRO/SHEATH, NON-LASER: HCPCS

## 2021-01-01 PROCEDURE — C1769 GUIDE WIRE: HCPCS

## 2021-01-01 PROCEDURE — C1900 LEAD, CORONARY VENOUS: HCPCS

## 2021-01-01 PROCEDURE — 6360000002 HC RX W HCPCS

## 2021-01-01 PROCEDURE — G8420 CALC BMI NORM PARAMETERS: HCPCS | Performed by: INTERNAL MEDICINE

## 2021-01-01 PROCEDURE — C1887 CATHETER, GUIDING: HCPCS

## 2021-01-01 PROCEDURE — 2580000003 HC RX 258: Performed by: PHYSICIAN ASSISTANT

## 2021-01-01 PROCEDURE — G0378 HOSPITAL OBSERVATION PER HR: HCPCS

## 2021-01-01 PROCEDURE — 93000 ELECTROCARDIOGRAM COMPLETE: CPT | Performed by: INTERNAL MEDICINE

## 2021-01-01 PROCEDURE — 6370000000 HC RX 637 (ALT 250 FOR IP): Performed by: INTERNAL MEDICINE

## 2021-01-01 PROCEDURE — 83735 ASSAY OF MAGNESIUM: CPT

## 2021-01-01 PROCEDURE — 33225 L VENTRIC PACING LEAD ADD-ON: CPT | Performed by: SPECIALIST

## 2021-01-01 PROCEDURE — 3017F COLORECTAL CA SCREEN DOC REV: CPT | Performed by: INTERNAL MEDICINE

## 2021-01-01 PROCEDURE — 99214 OFFICE O/P EST MOD 30 MIN: CPT | Performed by: SPECIALIST

## 2021-01-01 PROCEDURE — 93000 ELECTROCARDIOGRAM COMPLETE: CPT | Performed by: SPECIALIST

## 2021-01-01 PROCEDURE — 6370000000 HC RX 637 (ALT 250 FOR IP): Performed by: PHYSICIAN ASSISTANT

## 2021-01-01 PROCEDURE — 96374 THER/PROPH/DIAG INJ IV PUSH: CPT

## 2021-01-01 PROCEDURE — C1882 AICD, OTHER THAN SING/DUAL: HCPCS

## 2021-01-01 PROCEDURE — 97166 OT EVAL MOD COMPLEX 45 MIN: CPT

## 2021-01-01 PROCEDURE — 2060000000 HC ICU INTERMEDIATE R&B

## 2021-01-01 PROCEDURE — 33249 INSJ/RPLCMT DEFIB W/LEAD(S): CPT

## 2021-01-01 PROCEDURE — 93284 PRGRMG EVAL IMPLANTABLE DFB: CPT | Performed by: SPECIALIST

## 2021-01-01 PROCEDURE — C1730 CATH, EP, 19 OR FEW ELECT: HCPCS

## 2021-01-01 PROCEDURE — 99283 EMERGENCY DEPT VISIT LOW MDM: CPT

## 2021-01-01 PROCEDURE — 2580000003 HC RX 258

## 2021-01-01 PROCEDURE — 99214 OFFICE O/P EST MOD 30 MIN: CPT | Performed by: INTERNAL MEDICINE

## 2021-01-01 PROCEDURE — C1777 LEAD, AICD, ENDO SINGLE COIL: HCPCS

## 2021-01-01 PROCEDURE — 85025 COMPLETE CBC W/AUTO DIFF WBC: CPT

## 2021-01-01 PROCEDURE — 93005 ELECTROCARDIOGRAM TRACING: CPT

## 2021-01-01 PROCEDURE — 80053 COMPREHEN METABOLIC PANEL: CPT

## 2021-01-01 PROCEDURE — C1898 LEAD, PMKR, OTHER THAN TRANS: HCPCS

## 2021-01-01 PROCEDURE — 99215 OFFICE O/P EST HI 40 MIN: CPT | Performed by: SPECIALIST

## 2021-01-01 PROCEDURE — 70450 CT HEAD/BRAIN W/O DYE: CPT

## 2021-01-01 PROCEDURE — 72141 MRI NECK SPINE W/O DYE: CPT

## 2021-01-01 PROCEDURE — 84484 ASSAY OF TROPONIN QUANT: CPT

## 2021-01-01 PROCEDURE — 3700000001 HC ADD 15 MINUTES (ANESTHESIA)

## 2021-01-01 PROCEDURE — 97535 SELF CARE MNGMENT TRAINING: CPT

## 2021-01-01 PROCEDURE — 4040F PNEUMOC VAC/ADMIN/RCVD: CPT | Performed by: INTERNAL MEDICINE

## 2021-01-01 PROCEDURE — U0003 INFECTIOUS AGENT DETECTION BY NUCLEIC ACID (DNA OR RNA); SEVERE ACUTE RESPIRATORY SYNDROME CORONAVIRUS 2 (SARS-COV-2) (CORONAVIRUS DISEASE [COVID-19]), AMPLIFIED PROBE TECHNIQUE, MAKING USE OF HIGH THROUGHPUT TECHNOLOGIES AS DESCRIBED BY CMS-2020-01-R: HCPCS

## 2021-01-01 PROCEDURE — 80048 BASIC METABOLIC PNL TOTAL CA: CPT

## 2021-01-01 PROCEDURE — 99221 1ST HOSP IP/OBS SF/LOW 40: CPT | Performed by: PSYCHIATRY & NEUROLOGY

## 2021-01-01 PROCEDURE — 2709999900 HC NON-CHARGEABLE SUPPLY

## 2021-01-01 PROCEDURE — 6360000002 HC RX W HCPCS: Performed by: INTERNAL MEDICINE

## 2021-01-01 PROCEDURE — G8427 DOCREV CUR MEDS BY ELIG CLIN: HCPCS | Performed by: INTERNAL MEDICINE

## 2021-01-01 PROCEDURE — 33225 L VENTRIC PACING LEAD ADD-ON: CPT

## 2021-01-01 PROCEDURE — 72146 MRI CHEST SPINE W/O DYE: CPT

## 2021-01-01 PROCEDURE — 2580000003 HC RX 258: Performed by: SPECIALIST

## 2021-01-01 PROCEDURE — 1123F ACP DISCUSS/DSCN MKR DOCD: CPT | Performed by: INTERNAL MEDICINE

## 2021-01-01 PROCEDURE — 85730 THROMBOPLASTIN TIME PARTIAL: CPT

## 2021-01-01 PROCEDURE — 71045 X-RAY EXAM CHEST 1 VIEW: CPT

## 2021-01-01 PROCEDURE — 6360000002 HC RX W HCPCS: Performed by: NURSE ANESTHETIST, CERTIFIED REGISTERED

## 2021-01-01 PROCEDURE — 99285 EMERGENCY DEPT VISIT HI MDM: CPT

## 2021-01-01 PROCEDURE — 33249 INSJ/RPLCMT DEFIB W/LEAD(S): CPT | Performed by: SPECIALIST

## 2021-01-01 PROCEDURE — 1036F TOBACCO NON-USER: CPT | Performed by: INTERNAL MEDICINE

## 2021-01-01 PROCEDURE — 3700000000 HC ANESTHESIA ATTENDED CARE

## 2021-01-01 PROCEDURE — 97530 THERAPEUTIC ACTIVITIES: CPT

## 2021-01-01 PROCEDURE — 99232 SBSQ HOSP IP/OBS MODERATE 35: CPT | Performed by: NURSE PRACTITIONER

## 2021-01-01 PROCEDURE — 71046 X-RAY EXAM CHEST 2 VIEWS: CPT

## 2021-01-01 PROCEDURE — 81001 URINALYSIS AUTO W/SCOPE: CPT

## 2021-01-01 PROCEDURE — 82550 ASSAY OF CK (CPK): CPT

## 2021-01-01 RX ORDER — SODIUM CHLORIDE 0.9 % (FLUSH) 0.9 %
10 SYRINGE (ML) INJECTION EVERY 12 HOURS SCHEDULED
Status: DISCONTINUED | OUTPATIENT
Start: 2021-01-01 | End: 2021-01-01 | Stop reason: HOSPADM

## 2021-01-01 RX ORDER — PROPOFOL 10 MG/ML
INJECTION, EMULSION INTRAVENOUS PRN
Status: DISCONTINUED | OUTPATIENT
Start: 2021-01-01 | End: 2021-01-01 | Stop reason: SDUPTHER

## 2021-01-01 RX ORDER — CITALOPRAM 20 MG/1
10 TABLET ORAL DAILY
Status: DISCONTINUED | OUTPATIENT
Start: 2021-01-01 | End: 2021-01-01 | Stop reason: HOSPADM

## 2021-01-01 RX ORDER — SODIUM CHLORIDE 9 MG/ML
INJECTION, SOLUTION INTRAVENOUS CONTINUOUS
Status: DISCONTINUED | OUTPATIENT
Start: 2021-01-01 | End: 2021-01-01 | Stop reason: HOSPADM

## 2021-01-01 RX ORDER — ACETAMINOPHEN 325 MG/1
650 TABLET ORAL EVERY 6 HOURS PRN
Status: DISCONTINUED | OUTPATIENT
Start: 2021-01-01 | End: 2021-01-01 | Stop reason: HOSPADM

## 2021-01-01 RX ORDER — PHYTONADIONE 5 MG/1
5 TABLET ORAL ONCE
Status: DISCONTINUED | OUTPATIENT
Start: 2021-01-01 | End: 2021-01-01

## 2021-01-01 RX ORDER — BUMETANIDE 1 MG/1
1 TABLET ORAL DAILY
Qty: 90 TABLET | Refills: 3 | Status: SHIPPED | OUTPATIENT
Start: 2021-01-01

## 2021-01-01 RX ORDER — FENTANYL CITRATE 50 UG/ML
INJECTION, SOLUTION INTRAMUSCULAR; INTRAVENOUS PRN
Status: DISCONTINUED | OUTPATIENT
Start: 2021-01-01 | End: 2021-01-01 | Stop reason: SDUPTHER

## 2021-01-01 RX ORDER — POTASSIUM CHLORIDE 7.45 MG/ML
10 INJECTION INTRAVENOUS PRN
Status: DISCONTINUED | OUTPATIENT
Start: 2021-01-01 | End: 2021-01-01 | Stop reason: HOSPADM

## 2021-01-01 RX ORDER — ATORVASTATIN CALCIUM 40 MG/1
40 TABLET, FILM COATED ORAL NIGHTLY
Status: DISCONTINUED | OUTPATIENT
Start: 2021-01-01 | End: 2021-01-01 | Stop reason: HOSPADM

## 2021-01-01 RX ORDER — MIDAZOLAM HYDROCHLORIDE 1 MG/ML
INJECTION INTRAMUSCULAR; INTRAVENOUS PRN
Status: DISCONTINUED | OUTPATIENT
Start: 2021-01-01 | End: 2021-01-01 | Stop reason: SDUPTHER

## 2021-01-01 RX ORDER — SODIUM CHLORIDE 9 MG/ML
25 INJECTION, SOLUTION INTRAVENOUS PRN
Status: DISCONTINUED | OUTPATIENT
Start: 2021-01-01 | End: 2021-01-01 | Stop reason: HOSPADM

## 2021-01-01 RX ORDER — SODIUM CHLORIDE 0.9 % (FLUSH) 0.9 %
10 SYRINGE (ML) INJECTION PRN
Status: DISCONTINUED | OUTPATIENT
Start: 2021-01-01 | End: 2021-01-01 | Stop reason: HOSPADM

## 2021-01-01 RX ORDER — WARFARIN SODIUM 1 MG/1
TABLET ORAL
COMMUNITY

## 2021-01-01 RX ORDER — LORAZEPAM 2 MG/ML
0.5 INJECTION INTRAMUSCULAR ONCE
Status: COMPLETED | OUTPATIENT
Start: 2021-01-01 | End: 2021-01-01

## 2021-01-01 RX ORDER — WARFARIN SODIUM 5 MG/1
5 TABLET ORAL
Status: DISCONTINUED | OUTPATIENT
Start: 2021-01-01 | End: 2021-01-01 | Stop reason: HOSPADM

## 2021-01-01 RX ORDER — ACETAMINOPHEN 650 MG/1
650 SUPPOSITORY RECTAL EVERY 6 HOURS PRN
Status: DISCONTINUED | OUTPATIENT
Start: 2021-01-01 | End: 2021-01-01 | Stop reason: HOSPADM

## 2021-01-01 RX ORDER — ALLOPURINOL 300 MG/1
300 TABLET ORAL DAILY
Status: DISCONTINUED | OUTPATIENT
Start: 2021-01-01 | End: 2021-01-01 | Stop reason: HOSPADM

## 2021-01-01 RX ORDER — POTASSIUM CHLORIDE 20 MEQ/1
40 TABLET, EXTENDED RELEASE ORAL PRN
Status: DISCONTINUED | OUTPATIENT
Start: 2021-01-01 | End: 2021-01-01 | Stop reason: HOSPADM

## 2021-01-01 RX ORDER — NALOXONE HYDROCHLORIDE 0.4 MG/ML
INJECTION, SOLUTION INTRAMUSCULAR; INTRAVENOUS; SUBCUTANEOUS PRN
Status: DISCONTINUED | OUTPATIENT
Start: 2021-01-01 | End: 2021-01-01 | Stop reason: SDUPTHER

## 2021-01-01 RX ORDER — CEFAZOLIN SODIUM 1 G/3ML
INJECTION, POWDER, FOR SOLUTION INTRAMUSCULAR; INTRAVENOUS PRN
Status: DISCONTINUED | OUTPATIENT
Start: 2021-01-01 | End: 2021-01-01 | Stop reason: SDUPTHER

## 2021-01-01 RX ORDER — PROPOFOL 10 MG/ML
INJECTION, EMULSION INTRAVENOUS CONTINUOUS PRN
Status: DISCONTINUED | OUTPATIENT
Start: 2021-01-01 | End: 2021-01-01 | Stop reason: SDUPTHER

## 2021-01-01 RX ORDER — SODIUM CHLORIDE 9 MG/ML
INJECTION, SOLUTION INTRAVENOUS CONTINUOUS PRN
Status: DISCONTINUED | OUTPATIENT
Start: 2021-01-01 | End: 2021-01-01 | Stop reason: SDUPTHER

## 2021-01-01 RX ORDER — ASPIRIN 81 MG/1
81 TABLET, CHEWABLE ORAL DAILY
Status: DISCONTINUED | OUTPATIENT
Start: 2021-01-01 | End: 2021-01-01

## 2021-01-01 RX ORDER — METOPROLOL SUCCINATE 25 MG/1
25 TABLET, EXTENDED RELEASE ORAL DAILY
Status: DISCONTINUED | OUTPATIENT
Start: 2021-01-01 | End: 2021-01-01 | Stop reason: HOSPADM

## 2021-01-01 RX ORDER — ACETAMINOPHEN 160 MG
TABLET,DISINTEGRATING ORAL DAILY
COMMUNITY

## 2021-01-01 RX ORDER — ROPINIROLE 0.5 MG/1
0.5 TABLET, FILM COATED ORAL 3 TIMES DAILY
Status: DISCONTINUED | OUTPATIENT
Start: 2021-01-01 | End: 2021-01-01 | Stop reason: HOSPADM

## 2021-01-01 RX ORDER — PHENYLEPHRINE HYDROCHLORIDE 10 MG/ML
INJECTION INTRAVENOUS PRN
Status: DISCONTINUED | OUTPATIENT
Start: 2021-01-01 | End: 2021-01-01 | Stop reason: SDUPTHER

## 2021-01-01 RX ORDER — WARFARIN SODIUM 5 MG/1
5 TABLET ORAL
Status: COMPLETED | OUTPATIENT
Start: 2021-01-01 | End: 2021-01-01

## 2021-01-01 RX ORDER — ROPINIROLE 0.5 MG/1
0.5 TABLET, FILM COATED ORAL 3 TIMES DAILY
Qty: 90 TABLET | Refills: 3 | Status: SHIPPED | OUTPATIENT
Start: 2021-01-01

## 2021-01-01 RX ADMIN — MIDAZOLAM 2 MG: 1 INJECTION INTRAMUSCULAR; INTRAVENOUS at 09:30

## 2021-01-01 RX ADMIN — PHENYLEPHRINE HYDROCHLORIDE 6 MG: 10 INJECTION INTRAVENOUS at 10:07

## 2021-01-01 RX ADMIN — ROPINIROLE HYDROCHLORIDE 0.5 MG: 0.5 TABLET, FILM COATED ORAL at 09:37

## 2021-01-01 RX ADMIN — ROPINIROLE HYDROCHLORIDE 0.5 MG: 0.5 TABLET, FILM COATED ORAL at 09:03

## 2021-01-01 RX ADMIN — CITALOPRAM 10 MG: 20 TABLET, FILM COATED ORAL at 09:38

## 2021-01-01 RX ADMIN — SODIUM CHLORIDE: 9 INJECTION, SOLUTION INTRAVENOUS at 07:48

## 2021-01-01 RX ADMIN — ALLOPURINOL 300 MG: 300 TABLET ORAL at 09:38

## 2021-01-01 RX ADMIN — FENTANYL CITRATE 25 MCG: 50 INJECTION, SOLUTION INTRAMUSCULAR; INTRAVENOUS at 10:50

## 2021-01-01 RX ADMIN — SACUBITRIL AND VALSARTAN 1 TABLET: 24; 26 TABLET, FILM COATED ORAL at 21:04

## 2021-01-01 RX ADMIN — Medication 10 ML: at 08:20

## 2021-01-01 RX ADMIN — METOPROLOL SUCCINATE 25 MG: 25 TABLET, EXTENDED RELEASE ORAL at 08:20

## 2021-01-01 RX ADMIN — CITALOPRAM 10 MG: 20 TABLET, FILM COATED ORAL at 09:22

## 2021-01-01 RX ADMIN — CITALOPRAM 10 MG: 20 TABLET, FILM COATED ORAL at 08:20

## 2021-01-01 RX ADMIN — PROPOFOL 50 MG: 10 INJECTION, EMULSION INTRAVENOUS at 10:00

## 2021-01-01 RX ADMIN — ROPINIROLE HYDROCHLORIDE 0.5 MG: 0.5 TABLET, FILM COATED ORAL at 22:49

## 2021-01-01 RX ADMIN — ROPINIROLE HYDROCHLORIDE 0.5 MG: 0.5 TABLET, FILM COATED ORAL at 21:04

## 2021-01-01 RX ADMIN — ENOXAPARIN SODIUM 60 MG: 60 INJECTION SUBCUTANEOUS at 22:49

## 2021-01-01 RX ADMIN — SODIUM CHLORIDE: 9 INJECTION, SOLUTION INTRAVENOUS at 09:15

## 2021-01-01 RX ADMIN — FENTANYL CITRATE 25 MCG: 50 INJECTION, SOLUTION INTRAMUSCULAR; INTRAVENOUS at 10:10

## 2021-01-01 RX ADMIN — CITALOPRAM 10 MG: 20 TABLET, FILM COATED ORAL at 09:03

## 2021-01-01 RX ADMIN — METOPROLOL SUCCINATE 25 MG: 25 TABLET, EXTENDED RELEASE ORAL at 09:04

## 2021-01-01 RX ADMIN — SODIUM CHLORIDE: 9 INJECTION, SOLUTION INTRAVENOUS at 10:07

## 2021-01-01 RX ADMIN — ATORVASTATIN CALCIUM 40 MG: 40 TABLET, FILM COATED ORAL at 22:49

## 2021-01-01 RX ADMIN — Medication 10 ML: at 21:04

## 2021-01-01 RX ADMIN — METOPROLOL SUCCINATE 25 MG: 25 TABLET, EXTENDED RELEASE ORAL at 09:37

## 2021-01-01 RX ADMIN — ENOXAPARIN SODIUM 60 MG: 60 INJECTION SUBCUTANEOUS at 09:03

## 2021-01-01 RX ADMIN — FENTANYL CITRATE 25 MCG: 50 INJECTION, SOLUTION INTRAMUSCULAR; INTRAVENOUS at 10:00

## 2021-01-01 RX ADMIN — Medication 10 ML: at 22:15

## 2021-01-01 RX ADMIN — ATORVASTATIN CALCIUM 40 MG: 40 TABLET, FILM COATED ORAL at 20:36

## 2021-01-01 RX ADMIN — ALLOPURINOL 300 MG: 300 TABLET ORAL at 09:21

## 2021-01-01 RX ADMIN — FENTANYL CITRATE 25 MCG: 50 INJECTION, SOLUTION INTRAMUSCULAR; INTRAVENOUS at 10:40

## 2021-01-01 RX ADMIN — ROPINIROLE HYDROCHLORIDE 0.5 MG: 0.5 TABLET, FILM COATED ORAL at 13:37

## 2021-01-01 RX ADMIN — PROPOFOL 35 MCG/KG/MIN: 10 INJECTION, EMULSION INTRAVENOUS at 09:45

## 2021-01-01 RX ADMIN — NALOXONE HYDROCHLORIDE 0.08 MG: 0.4 INJECTION, SOLUTION INTRAMUSCULAR; INTRAVENOUS; SUBCUTANEOUS at 11:10

## 2021-01-01 RX ADMIN — Medication 10 ML: at 22:50

## 2021-01-01 RX ADMIN — ATORVASTATIN CALCIUM 40 MG: 40 TABLET, FILM COATED ORAL at 22:15

## 2021-01-01 RX ADMIN — CEFAZOLIN 2000 MG: 1 INJECTION, POWDER, FOR SOLUTION INTRAMUSCULAR; INTRAVENOUS at 09:30

## 2021-01-01 RX ADMIN — Medication 10 ML: at 20:36

## 2021-01-01 RX ADMIN — ALLOPURINOL 300 MG: 300 TABLET ORAL at 09:03

## 2021-01-01 RX ADMIN — SACUBITRIL AND VALSARTAN 1 TABLET: 24; 26 TABLET, FILM COATED ORAL at 22:15

## 2021-01-01 RX ADMIN — NALOXONE HYDROCHLORIDE 0.08 MG: 0.4 INJECTION, SOLUTION INTRAMUSCULAR; INTRAVENOUS; SUBCUTANEOUS at 11:15

## 2021-01-01 RX ADMIN — Medication 10 ML: at 09:38

## 2021-01-01 RX ADMIN — SACUBITRIL AND VALSARTAN 1 TABLET: 24; 26 TABLET, FILM COATED ORAL at 22:55

## 2021-01-01 RX ADMIN — Medication 10 ML: at 09:03

## 2021-01-01 RX ADMIN — FENTANYL CITRATE 75 MCG: 50 INJECTION, SOLUTION INTRAMUSCULAR; INTRAVENOUS at 09:45

## 2021-01-01 RX ADMIN — LORAZEPAM 0.5 MG: 2 INJECTION INTRAMUSCULAR; INTRAVENOUS at 14:01

## 2021-01-01 RX ADMIN — ATORVASTATIN CALCIUM 40 MG: 40 TABLET, FILM COATED ORAL at 21:04

## 2021-01-01 RX ADMIN — WARFARIN SODIUM 5 MG: 5 TABLET ORAL at 18:51

## 2021-01-01 RX ADMIN — MIDAZOLAM 1 MG: 1 INJECTION INTRAMUSCULAR; INTRAVENOUS at 09:45

## 2021-01-01 RX ADMIN — ALLOPURINOL 300 MG: 300 TABLET ORAL at 08:20

## 2021-01-01 ASSESSMENT — PULMONARY FUNCTION TESTS
PIF_VALUE: 1
PIF_VALUE: 13
PIF_VALUE: 14
PIF_VALUE: 14
PIF_VALUE: 13
PIF_VALUE: 1
PIF_VALUE: 13
PIF_VALUE: 14
PIF_VALUE: 13

## 2021-01-01 ASSESSMENT — ENCOUNTER SYMPTOMS
SORE THROAT: 0
BACK PAIN: 0
DIARRHEA: 0
COUGH: 0
CHEST TIGHTNESS: 0
WHEEZING: 0
WHEEZING: 0
SHORTNESS OF BREATH: 0
NAUSEA: 0
ABDOMINAL PAIN: 0
COUGH: 0
ABDOMINAL PAIN: 0
DIARRHEA: 0
SHORTNESS OF BREATH: 0
NAUSEA: 0
VOMITING: 0
BACK PAIN: 0
CONSTIPATION: 0
VOMITING: 0
BLOOD IN STOOL: 0

## 2021-01-01 ASSESSMENT — PAIN SCALES - GENERAL
PAINLEVEL_OUTOF10: 0

## 2021-01-01 ASSESSMENT — LIFESTYLE VARIABLES: SMOKING_STATUS: 0

## 2021-01-15 NOTE — TELEPHONE ENCOUNTER
He has moderate leaking of his mitral valve. His ejection fraction is approximately 35%. He has moderate elevation of his pressure into his lungs.

## 2021-02-09 NOTE — LETTER
Stacey Sanchez paid his first visit to the [facility]) Cardiac Electrophysiology office on 2/9/21. SYNOPSIS  1. Poor metabolic health incurring multiple comorbid conditions. 2. Advanced atherosclerosis, thus far incurring severe MVD, MI (time-indeterminate, but not recent) and carotid stenting (distant). 3. Heart failure with reduced ejection fraction (12/20 echocardiogram = 33%): etiology ischemic. Maximally tolerated medical therapy > 90 days. Current NYHA class II. 4. Mitral regurgitation: not severe. 5. \"Trifascicular\" block. QRS duration about 150 msec. PLAN  1. Trade wearable defibrillator system for implanted resynchronization defibrillator system. I will keep you posted.       Nga Ocasio MD, [facility]

## 2021-02-09 NOTE — PROGRESS NOTES
Annabelle Su paid his first visit to the [facility]) Cardiac Electrophysiology office on 2/9/21. SYNOPSIS  1. Poor metabolic health incurring multiple comorbid conditions. 2. Advanced atherosclerosis, thus far incurring severe MVD, MI (time-indeterminate, but not recent) and carotid stenting (distant). 3. Heart failure with reduced ejection fraction (12/20 echocardiogram = 33%): etiology ischemic. Maximally tolerated medical therapy > 90 days. Current NYHA class II. 4. Mitral regurgitation: not severe. 5. \"Trifascicular\" block. QRS duration about 150 msec. PLAN  1. Trade wearable defibrillator system for implanted resynchronization defibrillator system. I will keep you posted. Andrew Paez MD, [facility]      A total of 60 minutes were spent in preparation for the clinic visit, reviewing records/tests, counseling/education of the patient, ordering medications/tests/procedures, coordinating care, and documenting clinical information in the EHR.

## 2021-02-11 NOTE — TELEPHONE ENCOUNTER
2/11/21   WILFREDO SAW DR Harshil Carlton ON 2/9/21. DR Harshil Carlton IS TAKING CARE OF THE LIFE VEST ISSUE.   RLL

## 2021-02-19 NOTE — ANESTHESIA PRE PROCEDURE
Department of Anesthesiology  Preprocedure Note       Name:  Myranda Amado   Age:  69 y.o.  :  1950                                          MRN:  [id number]         Date:  2021      Surgeon: Zoila Thomas    Procedure: ICD implant    Medications prior to admission:   Prior to Admission medications    Medication Sig Start Date End Date Taking?  Authorizing Provider   Cholecalciferol (VITAMIN D3) 50 MCG ( [place]) CAPS Take by mouth daily   Yes Historical Provider, MD   bumetanide (BUMEX) 1 MG tablet TAKE ONE TABLET BY MOUTH DAILY AS NEEDED (WEIGHT GAIN, SHORTNESS OF BREATH, SWELLING) 20  Yes Terrence Goss MD   sacubitril-valsartan (ENTRESTO) 24-26 MG per tablet Take 1 tablet by mouth nightly 20  Yes CHRISTI Mcgee CNP   aspirin 81 MG chewable tablet Take 1 tablet by mouth daily 20  Yes Josefa Call MD   atorvastatin (LIPITOR) 40 MG tablet Take 1 tablet by mouth nightly 20  Yes Josefa Call MD   metoprolol succinate (TOPROL XL) 25 MG extended release tablet Take 1 tablet by mouth daily 20  Yes Josefa Call MD   citalopram (CELEXA) 10 MG tablet Take 10 mg by mouth daily   Yes Historical Provider, MD   allopurinol (ZYLOPRIM) 300 MG tablet TAKE ONE TABLET BY MOUTH EVERY DAY 20  Yes Agus Kovacs DO   warfarin (COUMADIN) 5 MG tablet Take 1 tablet by mouth daily Need INR checked 20   Josefa Call MD       Current medications:    Current Outpatient Medications   Medication Sig Dispense Refill    Cholecalciferol (VITAMIN D3) 50 MCG ( UT) CAPS Take by mouth daily      bumetanide (BUMEX) 1 MG tablet TAKE ONE TABLET BY MOUTH DAILY AS NEEDED (WEIGHT GAIN, SHORTNESS OF BREATH, SWELLING) 30 tablet 6    sacubitril-valsartan (ENTRESTO) 24-26 MG per tablet Take 1 tablet by mouth nightly 60 tablet 3    aspirin 81 MG chewable tablet Take 1 tablet by mouth daily 30 tablet 3  atorvastatin (LIPITOR) 40 MG tablet Take 1 tablet by mouth nightly 30 tablet 3    metoprolol succinate (TOPROL XL) 25 MG extended release tablet Take 1 tablet by mouth daily 30 tablet 3    citalopram (CELEXA) 10 MG tablet Take 10 mg by mouth daily      allopurinol (ZYLOPRIM) 300 MG tablet TAKE ONE TABLET BY MOUTH EVERY DAY 30 tablet 5    warfarin (COUMADIN) 5 MG tablet Take 1 tablet by mouth daily Need INR checked Monday 30 tablet 3     Current Facility-Administered Medications   Medication Dose Route Frequency Provider Last Rate Last Admin    0.9 % sodium chloride infusion   Intravenous Continuous Patricia Moore MD   Stopped at 02/19/21 1127    0.9 % sodium chloride infusion   Intravenous Continuous Patricia Moore MD 20 mL/hr at 02/19/21 0748 New Bag at 02/19/21 0915       Allergies:  No Known Allergies    Problem List:    Patient Active Problem List   Diagnosis Code    HTN (hypertension) I10    OA (osteoarthritis) M19.90    Hyperlipidemia E78.5    Bursal abscess M71.00    Gout synovitis M10.9    Chronic gout of right elbow M1A.0210    Gouty tophus L elbow M1A. 9XX1    Gout M10.9    Anxiety F41.9    Carotid artery stenosis I65.29    Unilateral inguinal hernia K40.90    Congestive heart failure of unknown etiology (HCC) I50.9    CAD in native artery I25.10    Ischemic cardiomyopathy X04.8    Acute systolic CHF (congestive heart failure) (HCC) I50.21    Apical mural thrombus I51.3       Past Medical History:        Diagnosis Date    Blurred vision, right eye 2007    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,     CAD (coronary artery disease)     Gout     HFrEF (heart failure with reduced ejection fraction) ([facility] [address] 08/19/2020 8/13/2020- echo- LVEF 15-20%, stage II DD, RVSF low normal, moderate-severe MR, moderate TR, moderate [place]    HFrEF (heart failure with reduced ejection fraction) ([address]     History of cardiovascular stress test 07/2015    Lexiscan stress test    HLD (hyperlipidemia)  Hypertension     Ischemic cardiomyopathy     Kidney stone     stent/    NSVT (nonsustained ventricular tachycardia) (HCC)     Osteoarthritis     PAD (peripheral artery disease) (HCC)     Valvular heart disease        Past Surgical History:        Procedure Laterality Date    CARDIAC CATHETERIZATION  2020    LITHOTRIPSY      MANDIBLE SURGERY      OTHER SURGICAL HISTORY Left 2014    EXCISION OF OLECRANON BURSA    OTHER SURGICAL HISTORY  2018    Laparoscopic transabdominal right inguinal hernia repair with mesk and Excision of Lipoma of the Cord    TONSILLECTOMY         Social History:    Social History     Tobacco Use    Smoking status: Former Smoker     Packs/day: 0.25     Years: 50.00     Pack years: 12.50     Types: Cigarettes     Quit date: 2015     Years since quittin.8    Smokeless tobacco: Never Used   Substance Use Topics    Alcohol use: Not Currently                                Counseling given: Not Answered      Vital Signs (Current):   Vitals:    21 0735   Weight: 136 lb (61.7 kg)   Height: 5' 9\" (1.753 m)                                              BP Readings from Last 3 Encounters:   21 123/72   21 108/68   20 98/60       NPO Status:  >8 hrs                                                                               BMI:   Wt Readings from Last 3 Encounters:   21 136 lb (61.7 kg)   21 136 lb (61.7 kg)   20 140 lb (63.5 kg)     Body mass index is 20.08 kg/m².     CBC:   Lab Results   Component Value Date    WBC 6.7 2021    RBC 4.00 2021    HGB 12.2 2021    HCT 38.0 2021    MCV 95.0 2021    RDW 15.2 2021     2021       CMP:   Lab Results   Component Value Date     2021    K 3.8 2021    K 4.1 2020    CL 94 2021    CO2 31 2021    BUN 26 2021    CREATININE 1.1 2021    GFRAA >60 2021    LABGLOM >60 2021 GLUCOSE 115 02/19/2021    GLUCOSE 101 09/21/2011    PROT 6.4 08/14/2020    CALCIUM 10.0 02/19/2021    BILITOT 0.9 08/14/2020    ALKPHOS 137 08/14/2020    AST 51 08/14/2020    ALT 73 08/14/2020       POC Tests: No results for input(s): POCGLU, POCNA, POCK, POCCL, POCBUN, POCHEMO, POCHCT in the last 72 hours. Coags:   Lab Results   Component Value Date    PROTIME 12.8 02/19/2021    INR 1.1 02/19/2021    APTT 40.0 08/21/2020       HCG (If Applicable): No results found for: PREGTESTUR, PREGSERUM, HCG, HCGQUANT     ABGs: No results found for: PHART, PO2ART, JWY4TWG, VND8FPA, BEART, H5ZSLRYT     Type & Screen (If Applicable):  No results found for: LABABO, LABRH    Drug/Infectious Status (If Applicable):  Lab Results   Component Value Date    HEPCAB NON REACT 09/11/2013       COVID-19 Screening (If Applicable):   Lab Results   Component Value Date    COVID19 Not Detected 02/15/2021         Anesthesia Evaluation  Patient summary reviewed and Nursing notes reviewed no history of anesthetic complications:   Airway: Mallampati: II  TM distance: >3 FB   Neck ROM: full  Mouth opening: > = 3 FB Dental:    (+) edentulous, lower dentures and upper dentures      Pulmonary:normal exam  breath sounds clear to auscultation      (-) not a current smoker                           Cardiovascular:  Exercise tolerance: poor (<4 METS),   (+) hypertension:, valvular problems/murmurs (mod TR, mod AZ): MR, AS and AI, CAD:, dysrhythmias: SVT, CHF (ICM): systolic, LINDSEY:, pulmonary hypertension: moderate,       NYHA Classification: II  ECG reviewed  Rhythm: regular  Rate: normal  Echocardiogram reviewed    Cleared by cardiology           ROS comment: HLD     Neuro/Psych:   (+) CVA (OD blurred vision): residual symptoms, depression/anxiety             GI/Hepatic/Renal:   (+) renal disease: kidney stones,           Endo/Other:    (+) : arthritis: OA., .           Pt had no PAT visit       Abdominal:           Vascular: ROS comment: PAD. Anesthesia Plan      MAC     ASA 4       Induction: intravenous. Anesthetic plan and risks discussed with patient. Use of blood products discussed with patient whom. Plan discussed with attending.                   Antionette Tang DO   2/19/2021

## 2021-02-19 NOTE — OP NOTE
Operative Note      Patient: Johnny Rich  YOB: 1950  MRN: [id number]    Date of Procedure: 2/19/2021    Pre-Op Diagnosis: Heart failure with reduced ejection fraction. Trifascicular block. Sinus node somnolence. Post-Op Diagnosis: Same        Procedure: Implantation of resynchronization defibrillation system. Surgeon: Alvaro Salas MD    Anesthesia: MAC    Estimated Blood Loss (mL): less than 50     Complications: None    Specimens: None    Implants: See below      Drains: None    Detailed Description of Procedure:   Anesthesia was handled by the anesthesiology service. The patient was prepped and draped in the usual sterile fashion. Intravenous antibiotic was administered prior to the procedure. An incision was made in the skin overlying the left deltopectoral groove, and carried down to the pectoralis major muscle fascia. A \"pocket\" was fashioned along that plane to [facility] extravascular system materials. Access to the central circulation was achieved utilizing axillary vein puncture technique. Three leads were implanted into the heart. The lead terminating in the right atrium is a [id number] [id number] ([id number]). Amplitude 2.6 mV, capture threshold 0.7 V, impedance 551 ohms. The lead terminating in the right ventricle is a [employer] [id number] ([id number]). Amplitude 19 mV, capture threshold 0.5 V, impedance 426 ohms. The lead terminating in an epicardial venous branch overlying the free wall of the left ventricle is a [id number] ([id number]). Amplitude 14 mV, capture threshold 1.6 V, impedance 640 ohms. The 3 leads were attached to a pulse generator, [employer] [id number] ([id number]). Extravascular system materials were placed into the pocket. Pocket wound was closed using Polysorb. Laboratory session was well-tolerated. The patient was sent to the recovery area in good condition.       Electronically signed by Alvaro Salas MD on 2/19/2021 at 11:18

## 2021-02-19 NOTE — ANESTHESIA POSTPROCEDURE EVALUATION
Department of Anesthesiology  Postprocedure Note    Patient: Johana Chan  MRN: [id number]  YOB: 1950  Date of evaluation: 2/19/2021  Time:  12:07 PM     Procedure Summary     Date: 02/19/21 [place] / [place] [facility] [place] [facility]    Anesthesia Start: 0915 Anesthesia Stop: 3362    Procedure: ICD W ANESTHESIA Diagnosis: Ischemic cardiomyopathy    Scheduled Providers: CHRISTI Sampson - KOKO; Leora Goddard DO Responsible Provider: Leora Goddard DO    Anesthesia Type: MAC ASA Status: 4          Anesthesia Type: MAC    Dhiraj Phase I:      Dhiraj Phase II:      Last vitals: Reviewed and per EMR flowsheets.        Anesthesia Post Evaluation    Patient location during evaluation: bedside  Patient participation: complete - patient cannot participate  Level of consciousness: awake and alert  Airway patency: patent  Nausea & Vomiting: no nausea and no vomiting  Complications: no  Cardiovascular status: blood pressure returned to baseline  Respiratory status: acceptable  Hydration status: euvolemic

## 2021-02-19 NOTE — H&P
Cardiac Electrophysiology Outpatient Progress Note    Severiano Bolognese  1950  Date of Service: 2/19/2021  Referring Provider/PCP: Linus Tipton DO  Chief Complaint: Here for ICD implantation procedure.     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    Chronic gout of right elbow 06/27/2012    Gout synovitis 02/07/2012    Bursal abscess 10/05/2011    HTN (hypertension) 04/20/2011    OA (osteoarthritis) 04/20/2011    Hyperlipidemia 04/20/2011       Current Outpatient Medications   Medication Sig Dispense Refill    Cholecalciferol (VITAMIN D3) 50 MCG (2000 UT) CAPS Take by mouth daily      bumetanide (BUMEX) 1 MG tablet TAKE ONE TABLET BY MOUTH DAILY AS NEEDED (WEIGHT GAIN, SHORTNESS OF BREATH, SWELLING) 30 tablet 6    sacubitril-valsartan (ENTRESTO) 24-26 MG per tablet Take 1 tablet by mouth nightly 60 tablet 3    aspirin 81 MG chewable tablet Take 1 tablet by mouth daily 30 tablet 3    atorvastatin (LIPITOR) 40 MG tablet Take 1 tablet by mouth nightly 30 tablet 3    metoprolol succinate (TOPROL XL) 25 MG extended release tablet Take 1 tablet by mouth daily 30 tablet 3    citalopram (CELEXA) 10 MG tablet Take 10 mg by mouth daily      allopurinol (ZYLOPRIM) 300 MG tablet TAKE ONE TABLET BY MOUTH EVERY DAY 30 tablet 5    warfarin (COUMADIN) 5 MG tablet Take 1 tablet by mouth daily Need INR checked Monday 30 tablet 3     Current Facility-Administered Medications   Medication Dose Route Frequency Provider Last Rate Last Admin    0.9 % sodium chloride infusion   Intravenous Continuous regular rhythm present. PMI is not displaced. Pulmonary/Chest: Effort normal and breath sounds normal. No respiratory distress. Abdominal: Soft. Normal appearance and bowel sounds are normal.  No abdominal bruit and no pulsatile midline mass. There is no hepatomegaly. No tenderness. Musculoskeletal: Normal range of motion of all extremities, no muscle weakness. Neurological: Alert and oriented to person, place, and time. Gait normal.   Skin: Skin is warm and dry. No bruising, no ecchymosis and no rash noted. Extremity: No clubbing or cyanosis. No edema. Psychiatric: Normal mood and affect. Thought content normal.     PERTINENT LABS  CBC:   Recent Labs     02/19/21  0740   WBC 6.7   HGB 12.2*   HCT 38.0        BMP:  Recent Labs     02/19/21  0740      K 3.8   CL 94*   CO2 31*   BUN 26*   CREATININE 1.1   CALCIUM 10.0     ABGs: No results found for: PHART, PO2ART, KPT6LML  INR:   Recent Labs     02/19/21  0745   INR 1.1     BNP: No results for input(s): BNP in the last 72 hours. TSH:   Lab Results   Component Value Date    TSH 2.190 12/06/2017      Cardiac Injury Profile: No results for input(s): CKTOTAL, CKMB, CKMBINDEX, TROPONINI in the last 72 hours. Lipid Profile:   Lab Results   Component Value Date    TRIG 64 08/14/2020    HDL 55 08/14/2020    LDLCALC 114 08/14/2020    CHOL 182 08/14/2020      Hemoglobin A1C: No components found for: HGBA1C   Xray:   No orders to display       IMPRESSION    1. As above. PLAN    1. Implant resynchronization ICD system. I explained the potential risks of this procedure to this patient. They include, but are not limited to, loss of life, loss of limb, CVA, MI, cardiac perforation, need for emergent open-chest surgery, infection, and pneumothorax. I believe that he understands these risks, and has made an informed decision to proceed.       Pan Huff MD, [facility]

## 2021-02-19 NOTE — BRIEF OP NOTE
Brief Postoperative Note      Patient: Johnny Rich  YOB: 1950  MRN: [id number]    Date of Procedure: 2/19/2021    CRT-D via LSCLV  No complications apparent at this point.     Electronically signed by Alvaro Salas MD on 2/19/2021 at 11:16 AM

## 2021-02-21 NOTE — DISCHARGE INSTR - COC
Continuity of Care Form    Patient Name: Johnny Rich   :  1950  MRN:  [id number]    [address] [place] [address] date:  2021  Discharge date:      Code Status Order: Prior   Advance Directives:      Admitting Physician:  No admitting provider for patient encounter. PCP: Haven Solano DO    Discharging Nurse: [facility] Unit/Room#: No information available for this encounter. Discharging Unit Phone Number: ***    Emergency Contact:   Extended Emergency Contact Information  Primary Emergency Contact: DomingoClaudia  Address: NOT LISTED BY PATIENT   [address] Phone: [phone]  Mobile Phone: [phone]  Relation: Other   needed? No  Secondary Emergency Contact: LoeksandrCam  Address: NOT LISTED BY PATIENT  Home Phone: [phone]  Mobile Phone: [phone]  Relation: Other   needed? No    Past Surgical History:  Past Surgical History:   Procedure Laterality Date    CARDIAC CATHETERIZATION  2020    LITHOTRIPSY      MANDIBLE SURGERY      OTHER SURGICAL HISTORY Left 2014    EXCISION OF OLECRANON BURSA    OTHER SURGICAL HISTORY  2018    Laparoscopic transabdominal right inguinal hernia repair with mesk and Excision of Lipoma of the Cord    TONSILLECTOMY         Immunization History:   Immunization History   Administered Date(s) Administered    Influenza, Quadv, IM, (6 mo and older Fluzone, Flulaval, Fluarix and 3 yrs and older Afluria) 2019, 2020    Pneumococcal Polysaccharide ([id number]) 2018       Active Problems:  Patient Active Problem List   Diagnosis Code    HTN (hypertension) I10    OA (osteoarthritis) M19.90    Hyperlipidemia E78.5    Bursal abscess M71.00    Gout synovitis M10.9    Chronic gout of right elbow M1A.0210    Gouty tophus L elbow M1A. 9XX1    Gout M10.9    Anxiety F41.9    Carotid artery stenosis I65.29    Unilateral inguinal hernia K40.90    Congestive heart failure of unknown etiology (HCC) I50.9 CAD in native artery I25.10    Ischemic cardiomyopathy M33.8    Acute systolic CHF (congestive heart failure) ([facility]) I50.21    Apical mural thrombus I51.3       Isolation/Infection:   Isolation            No Isolation          Patient Infection Status       Infection Onset Added Last Indicated Last Indicated By Review Planned Expiration Resolved Resolved By    None active    Resolved    COVID-19 Rule Out 02/15/21 02/15/21 02/15/21 Covid-19 Ambulatory (Ordered)   02/16/21 Rule-Out Test Resulted            Nurse Assessment:  Last Vital Signs: Ht 5' 9\" (1.753 m)   Wt 136 lb (61.7 kg)   BMI 20.08 kg/m²     Last documented pain score (0-10 scale):    Last Weight:   Wt Readings from Last 1 Encounters:   02/19/21 136 lb (61.7 kg)     Mental Status:  {IP PT MENTAL STATUS:20030:::0}    IV Access:  { SUNSHINE IV ACCESS:295961866:::0}    Nursing Mobility/ADLs:  Walking   {CHP DME ADLs:219735013:::0}  Transfer  {CHP DME ADLs:309699693:::0}  Bathing  {CHP DME ADLs:524637630:::0}  Dressing  {CHP DME ADLs:559471858:::0}  Toileting  {CHP DME ADLs:889170703:::0}  Feeding  {CHP DME ADLs:717999674:::0}  Med Admin  {CHP DME ADLs:638631722:::0}  Med Delivery   { SUNSHINE MED Delivery:245160089:::0}    Wound Care Documentation and Therapy:        Elimination:  Continence: Bowel: {YES / WT:74563}  Bladder: {YES / LILIAN:71291}  Urinary Catheter: {Urinary Catheter:179077522:::0}   Colostomy/Ileostomy/Ileal Conduit: {YES / OU:08901}       Date of Last BM: ***  No intake or output data in the 24 hours ending 02/21/21 1233  No intake/output data recorded.     Safety Concerns:     [employer] Safety Concerns:595812659:::0}    Impairments/Disabilities:      [employer] Impairments/Disabilities:600372093:::0}    Nutrition Therapy:  Current Nutrition Therapy:   [employer] Diet List:060843236:::0}    Routes of Feeding: {CHP DME Other Feedings:350729140:::0}  Liquids: {Slp liquid thickness:02627}  Daily Fluid Restriction: {CHASE ANGULO Yes amt example:120735992:::0}  Last Modified Barium Swallow with Video (Video Swallowing Test): {Done Not Done [facility]:692533593:::6}    Treatments at the Time of Hospital Discharge:   Respiratory Treatments: ***  Oxygen Therapy:  {Therapy; copd oxygen:44477:::0}  Ventilator:    {[facility] Vent List:519905068:::0}    Rehab Therapies: {THERAPEUTIC INTERVENTION:4148934834}  Weight Bearing Status/Restrictions: {[facility] Weight Bearin:::0}  Other Medical Equipment (for information only, NOT a DME order):  {EQUIPMENT:247755304}  Other Treatments: ***    Patient's personal belongings (please select all that are sent with patient):  {CHP DME Belongings:089703211:::0}    RN SIGNATURE:  {Esignature:735617126:::0}    CASE MANAGEMENT/SOCIAL WORK SECTION    Inpatient Status Date: ***    Readmission Risk Assessment Score:  Readmission Risk              Risk of Unplanned Readmission:        0           Discharging to Facility/ Agency   Name:   Address:  Phone:  Fax:    Dialysis Facility (if applicable)   Name:  Address:  Dialysis Schedule:  Phone:  Fax:    / signature: {Esignature:491790945:::0}    PHYSICIAN SECTION    Prognosis: {Prognosis:9542078960:::0}    Condition at Discharge: [address] Patient Condition:126871829:::0}    Rehab Potential (if transferring to Rehab): {Prognosis:4056000622:::0}    Recommended Labs or Other Treatments After Discharge: ***    Physician Certification: I certify the above information and transfer of Stacey Sanchez  is necessary for the continuing treatment of the diagnosis listed and that he requires {Admit to Appropriate Level of Care:83744:::0} for {GREATER/LESS:769018030} 30 days.      Update Admission H&P: {CHP DME Changes in HandP:163404208:::0}    PHYSICIAN SIGNATURE:  {Esignature:568175524:::0}

## 2021-02-22 NOTE — TELEPHONE ENCOUNTER
I called Mann Nunez to see how he's doing since his CRT-D insertion on 2/19/21. He states he's doing fine & that the aquacel dressing remains intact. He denies any fevers, redness, bleeding, drainage, or swelling at ICD incision site. to continue to wear his sling at night. He's also aware of his follow up appt at the [address] [place] [address] Drive on 2/26/21 at 9:30 am.  He offers no complaints & is thankful for the phone call.

## 2021-02-26 NOTE — PROGRESS NOTES
See [facility] report. Wound and ICD check after implant. See wound photo.    Plan: recheck in 2 weeks    [address] [facility]

## 2021-02-26 NOTE — PATIENT INSTRUCTIONS
Continue arm restrictions until 3-  You may shower starting today. Call if any signs or symptoms of infection [phone] ext: [phone]  Fevers, chills, redness, swelling or drainage. For monitor questions:   [employer] support (home monitoring) [phone]    If you receive a shock    1 shock and you feel fine send a home transmission and call the office  [phone]   1 shock and you feel poorly after the shock go to the emergency room and do not drive. Multiple shocks report to the emergency room and do no drive.

## 2021-03-11 NOTE — PROGRESS NOTES
Wound recheck: incision without redness, edema or drainage. Keep follow up as scheduled.      Jesus Guerrero RN, BSN  [place]

## 2021-03-29 NOTE — ED NOTES
FIRST PROVIDER CONTACT ASSESSMENT NOTE      Department of Emergency Medicine   Admit Date: No admission date for patient encounter. Chief Complaint: Other (had pacer placed feb 19th, numbness in arms and legs getting worse since then)      History of Present Illness:    Arsh Croft is a 69 y.o. male who presents to the ED for his had a pacemaker placed on February 19 and since that time he had intermittent episodes of paresthesia to the bilateral upper extremities and right lower extremity getting weak. He states has had several falls. He denies any head or neck pain. He does have visible ecchymosis to the left upper extremity.   Denies any chest pain or shortness of breath.        -----------------END OF FIRST PROVIDER CONTACT ASSESSMENT NOTE--------------  Electronically signed by CHRISTI Busch CNP   DD: 3/29/21               CHRISTI Cruz CNP  03/29/21 4331

## 2021-03-30 NOTE — ED PROVIDER NOTES
Chief complaint:  Paresthesias    HPI history provided by the patient and friends  Patient comes in complaining of over a month of intermittent tingling or paresthesias in his arms and legs and feels like his left leg is not working quite right and sometimes he feels off balance. He also had recently lost his life partner and during history taking starts to talk about him and then hyperventilates and cries and becomes emotionally upset. States he has had all the symptoms for at least a month. He has seen his cardiologist who stated that is not his heart. He is seen his family doctor who states had a normal work-up and exam and cannot figure it out. States that he is here now for us to check about because they just want answers. No slurred speech, no facial droop. No actual loss of sensation anywhere. No confusion. No back pain. No neck or head or back injuries. No abdominal pain. No chest pain or palpitations or shortness of breath. No extremity swelling or pain. Review of Systems   Constitutional: Negative for chills, diaphoresis, fatigue and fever. HENT: Negative for congestion and sore throat. Respiratory: Negative for cough, chest tightness, shortness of breath and wheezing. Cardiovascular: Negative for chest pain, palpitations and leg swelling. Gastrointestinal: Negative for abdominal pain, diarrhea, nausea and vomiting. Genitourinary: Negative for dysuria, flank pain and frequency. Musculoskeletal: Positive for gait problem. Negative for arthralgias, back pain, joint swelling, myalgias, neck pain and neck stiffness. Skin: Negative for rash and wound. Neurological: Positive for numbness. Negative for dizziness, seizures, syncope, weakness, light-headedness and headaches. Hematological: Negative for adenopathy. All other systems reviewed and are negative. Physical Exam  Vitals signs and nursing note reviewed.    Constitutional:       General: He is not in acute distress. Appearance: He is well-developed. He is not ill-appearing, toxic-appearing or diaphoretic. HENT:      Head: Normocephalic and atraumatic. Comments: No sign of acute head or face injuries. Eyes:      General: No scleral icterus. Pupils: Pupils are equal, round, and reactive to light. Neck:      Musculoskeletal: Normal range of motion and neck supple. Normal range of motion. No neck rigidity, injury, pain with movement, spinous process tenderness or muscular tenderness. Cardiovascular:      Rate and Rhythm: Normal rate and regular rhythm. Heart sounds: Normal heart sounds. No murmur. Pulmonary:      Effort: Pulmonary effort is normal. No respiratory distress. Breath sounds: Normal breath sounds. No stridor, decreased air movement or transmitted upper airway sounds. No decreased breath sounds, wheezing, rhonchi or rales. Chest:      Chest wall: No tenderness. Abdominal:      General: Bowel sounds are normal. There is no distension. Palpations: Abdomen is soft. Tenderness: There is no abdominal tenderness. There is no right CVA tenderness, left CVA tenderness, guarding or rebound. Musculoskeletal:         General: No swelling, tenderness, deformity or signs of injury. Cervical back: Normal.      Thoracic back: Normal.      Lumbar back: Normal.      Right lower leg: No edema. Left lower leg: No edema. Skin:     General: Skin is warm and dry. Coloration: Skin is not jaundiced or pale. Findings: No erythema or rash. Neurological:      General: No focal deficit present. Mental Status: He is alert and oriented to person, place, and time. GCS: GCS eye subscore is 4. GCS verbal subscore is 5. GCS motor subscore is 6. Cranial Nerves: No cranial nerve deficit. Coordination: Coordination normal.      Comments: No focal neurologic deficits.   Patient is actually moving both lower extremities equal range of motion with equal defibrillator placement (02/19/2021). Social History:  reports that he quit smoking about 5 years ago. His smoking use included cigarettes. He has a 12.50 pack-year smoking history. He has never used smokeless tobacco. He reports previous alcohol use. He reports that he does not use drugs. Family History: family history includes Coronary Art Dis in his father; Heart Disease in his mother; Stroke in his mother. The patients home medications have been reviewed. Allergies: Patient has no known allergies.     -------------------------------------------------- RESULTS -------------------------------------------------  Labs:  Results for orders placed or performed during the hospital encounter of 03/29/21   Troponin   Result Value Ref Range    Troponin <0.01 0.00 - 0.03 ng/mL   CBC Auto Differential   Result Value Ref Range    WBC 7.6 4.5 - 11.5 E9/L    RBC 3.77 (L) 3.80 - 5.80 E12/L    Hemoglobin 11.8 (L) 12.5 - 16.5 g/dL    Hematocrit 35.1 (L) 37.0 - 54.0 %    MCV 93.1 80.0 - 99.9 fL    MCH 31.3 26.0 - 35.0 pg    MCHC 33.6 32.0 - 34.5 %    RDW 15.1 (H) 11.5 - 15.0 fL    Platelets 865 293 - 128 E9/L    MPV 10.6 7.0 - 12.0 fL    Neutrophils % 76.1 43.0 - 80.0 %    Immature Granulocytes % 0.3 0.0 - 5.0 %    Lymphocytes % 15.4 (L) 20.0 - 42.0 %    Monocytes % 7.6 2.0 - 12.0 %    Eosinophils % 0.3 0.0 - 6.0 %    Basophils % 0.3 0.0 - 2.0 %    Neutrophils Absolute 5.79 1.80 - 7.30 E9/L    Immature Granulocytes # 0.02 E9/L    Lymphocytes Absolute 1.17 (L) 1.50 - 4.00 E9/L    Monocytes Absolute 0.58 0.10 - 0.95 E9/L    Eosinophils Absolute 0.02 (L) 0.05 - 0.50 E9/L    Basophils Absolute 0.02 0.00 - 0.20 E9/L   Comprehensive Metabolic Panel   Result Value Ref Range    Sodium 138 132 - 146 mmol/L    Potassium 4.3 3.5 - 5.0 mmol/L    Chloride 96 (L) 98 - 107 mmol/L    CO2 25 22 - 29 mmol/L    Anion Gap 17 (H) 7 - 16 mmol/L    Glucose 114 (H) 74 - 99 mg/dL    BUN 21 8 - 23 mg/dL    CREATININE 0.9 0.7 - 1.2 mg/dL    GFR Non-African American >60 >=60 mL/min/1.73    GFR African American >60     Calcium 9.8 8.6 - 10.2 mg/dL    Total Protein 7.7 6.4 - 8.3 g/dL    Albumin 4.6 3.5 - 5.2 g/dL    Total Bilirubin 1.1 0.0 - 1.2 mg/dL    Alkaline Phosphatase 178 (H) 40 - 129 U/L    ALT 22 0 - 40 U/L    AST 35 0 - 39 U/L   Protime-INR   Result Value Ref Range    Protime 52.5 (H) 9.3 - 12.4 sec    INR 4.5    APTT   Result Value Ref Range    aPTT 38.1 (H) 24.5 - 35.1 sec   Magnesium   Result Value Ref Range    Magnesium 2.0 1.6 - 2.6 mg/dL   Urinalysis   Result Value Ref Range    Color, UA Yellow Straw/Yellow    Clarity, UA Clear Clear    Glucose, Ur Negative Negative mg/dL    Bilirubin Urine Negative Negative    Ketones, Urine Negative Negative mg/dL    Specific Gravity, UA 1.020 1.005 - 1.030    Blood, Urine TRACE (A) Negative    pH, UA 5.5 5.0 - 9.0    Protein, UA Negative Negative mg/dL    Urobilinogen, Urine 0.2 <2.0 E.U./dL    Nitrite, Urine Negative Negative    Leukocyte Esterase, Urine Negative Negative   Microscopic Urinalysis   Result Value Ref Range    WBC, UA NONE 0 - 5 /HPF    RBC, UA 0-1 0 - 2 /HPF    Epithelial Cells, UA NONE SEEN /HPF    Bacteria, UA NONE SEEN None Seen /HPF   EKG 12 Lead   Result Value Ref Range    Ventricular Rate 77 BPM    Atrial Rate 77 BPM    P-R Interval 154 ms    QRS Duration 146 ms    Q-T Interval 460 ms    QTc Calculation (Bazett) 520 ms    P Axis 77 degrees    R Axis -57 degrees    T Axis 82 degrees       Radiology:  XR CHEST 1 VIEW   Final Result   No acute process. CT HEAD WO CONTRAST   Final Result   No acute intracranial abnormality. Age-related loss of brain volume and   chronic periventricular ischemic changes.            EKG Interpretation    Interpreted by emergency department physician    Rhythm: paced  Rate: 77  Axis: normal  Ectopy: none  Conduction: nonspecific interventricular conduction block  ST Segments: no acute change  T Waves: no acute change  Q Waves: none    Clinical

## 2021-04-06 PROBLEM — R53.1 WEAKNESS: Status: ACTIVE | Noted: 2021-01-01

## 2021-04-06 PROBLEM — R79.1 SUPRATHERAPEUTIC INR: Status: ACTIVE | Noted: 2021-01-01

## 2021-04-06 NOTE — ED PROVIDER NOTES
Department of Emergency Medicine   ED  Provider Note  Admit Date/RoomTime: 4/6/2021  1:48 PM  ED Room: 06/06          History of Present Illness:  4/6/21, Time: 4:21 PM EDT  Chief Complaint   Patient presents with    Extremity Weakness     states he has a mobility issue with both upper and lower extremities since the middle of march                Eugenia Crane is a 69 y.o. male presenting to the ED for weakness. Patient states he had upper and lower extremity weakness in the middle of March. Came on gradually, nothing makes it better or worse, there is no associated pain. He has not had this in the past.  Denies any direct injury or trauma. Denies any slurred speech or any paresthesias. Denies any recent viral type symptoms, denies any recent immunizations. He states his hands are now intermittently becoming contracted, bilaterally, or is having difficulty holding objects. He was evaluated at [facility] about a week ago, had negative imaging, and was discharged. He says he cannot see neurology for at least another 2 months, and he is having difficulty functioning at home. Denies any fever, chills, nausea, vomiting, change in bowel or bladder, paresthesias, or any other symptoms or complaints.     Review of Systems:   Pertinent positives and negatives are stated within HPI, all other systems reviewed and are negative.        --------------------------------------------- PAST HISTORY ---------------------------------------------  Past Medical History:  has a past medical history of Blurred vision, right eye, CAD (coronary artery disease), Gout, HFrEF (heart failure with reduced ejection fraction) ([facility] [address] HFrEF (heart failure with reduced ejection fraction) ([facility] [address] History of cardiovascular stress test, HLD (hyperlipidemia), Hypertension, Ischemic cardiomyopathy, Kidney stone, NSVT (nonsustained ventricular tachycardia) ([address] Osteoarthritis, PAD (peripheral artery disease) ([facility] [address] and Valvular heart disease. Past Surgical History:  has a past surgical history that includes Mandible surgery; Lithotripsy; other surgical history (Left, 05/08/2014); Tonsillectomy; other surgical history (02/08/2018); Cardiac catheterization (08/18/2020); and Cardiac defibrillator placement (02/19/2021). Social History:  reports that he quit smoking about 5 years ago. His smoking use included cigarettes. He has a 12.50 pack-year smoking history. He has never used smokeless tobacco. He reports previous alcohol use. He reports that he does not use drugs. Family History: family history includes Coronary Art Dis in his father; Heart Disease in his mother; Stroke in his mother. . Unless otherwise noted, family history is non contributory    The patients home medications have been reviewed. Allergies: Patient has no known allergies. ---------------------------------------------------PHYSICAL EXAM--------------------------------------    Constitutional/General: Alert and oriented x3  Head: Normocephalic and atraumatic  Eyes: PERRL, EOMI, sclera non icteric  Mouth: Oropharynx clear, handling secretions, no trismus, no asymmetry of the posterior oropharynx or uvular edema  Neck: Supple, full ROM, no stridor, no meningeal signs  Respiratory: Lungs clear to auscultation bilaterally, no wheezes, rales, or rhonchi. Not in respiratory distress  Cardiovascular:  Regular rate. Regular rhythm. 2+ distal pulses. Equal extremity pulses. Chest: No chest wall tenderness  GI:  Abdomen Soft, Non tender, Non distended. No rebound, guarding, or rigidity. No pulsatile masses. Musculoskeletal: Moves all extremities x 4. Warm and well perfused, no clubbing, cyanosis, or edema. Capillary refill <3 seconds  Integument: skin warm and dry. No rashes.    Neurologic: GCS 15, dorsiflexion, plantarflexion, extensor hallucis longus 5 out of 5 symmetrically, sensation intact in the lower extremities  Psychiatric: Normal Affect          -------------------------------------------------- RESULTS -------------------------------------------------  I have personally reviewed all laboratory and imaging results for this patient. Results are listed below.      LABS: (Lab results interpreted by me)  Results for orders placed or performed during the hospital encounter of 04/06/21   CBC   Result Value Ref Range    WBC 5.4 4.5 - 11.5 E9/L    RBC 3.59 (L) 3.80 - 5.80 E12/L    Hemoglobin 11.1 (L) 12.5 - 16.5 g/dL    Hematocrit 34.5 (L) 37.0 - 54.0 %    MCV 96.1 80.0 - 99.9 fL    MCH 30.9 26.0 - 35.0 pg    MCHC 32.2 32.0 - 34.5 %    RDW 15.8 (H) 11.5 - 15.0 fL    Platelets 302 198 - 236 E9/L    MPV 9.8 7.0 - 12.0 fL   Comprehensive Metabolic Panel   Result Value Ref Range    Sodium 136 132 - 146 mmol/L    Potassium 4.6 3.5 - 5.0 mmol/L    Chloride 100 98 - 107 mmol/L    CO2 27 22 - 29 mmol/L    Anion Gap 9 7 - 16 mmol/L    Glucose 122 (H) 74 - 99 mg/dL    BUN 12 8 - 23 mg/dL    CREATININE 0.8 0.7 - 1.2 mg/dL    GFR Non-African American >60 >=60 mL/min/1.73    GFR African American >60     Calcium 9.8 8.6 - 10.2 mg/dL    Total Protein 7.3 6.4 - 8.3 g/dL    Albumin 4.3 3.5 - 5.2 g/dL    Total Bilirubin 0.7 0.0 - 1.2 mg/dL    Alkaline Phosphatase 178 (H) 40 - 129 U/L    ALT 23 0 - 40 U/L    AST 30 0 - 39 U/L   Troponin   Result Value Ref Range    Troponin <0.01 0.00 - 0.03 ng/mL   Magnesium   Result Value Ref Range    Magnesium 2.1 1.6 - 2.6 mg/dL   CK   Result Value Ref Range    Total  (H) 20 - 200 U/L   APTT   Result Value Ref Range    aPTT 47.4 (H) 24.5 - 35.1 sec   Protime-INR   Result Value Ref Range    Protime 56.2 (H) 9.3 - 12.4 sec    INR 5.2 (HH)    EKG 12 Lead   Result Value Ref Range    Ventricular Rate 75 BPM    Atrial Rate 75 BPM    P-R Interval 146 ms    QRS Duration 156 ms    Q-T Interval 452 ms    QTc Calculation (Bazett) 504 ms    P Axis 56 degrees    R Axis -117 degrees    T Axis 84 degrees   ,       RADIOLOGY:  Interpreted by Radiologist unless otherwise specified  CT HEAD WO CONTRAST   Final Result   No acute intracranial abnormality. MRI CERVICAL SPINE WO CONTRAST    (Results Pending)         EKG Interpretation  Interpreted by emergency department physician, Dr. Edith Rainey           ------------------------- NURSING NOTES AND VITALS REVIEWED ---------------------------   The nursing notes within the ED encounter and vital signs as below have been reviewed by myself  /70   Pulse 70   Temp 97.5 °F (36.4 °C)   Resp 15   Ht 5' 9\" (1.753 m)   Wt 134 lb (60.8 kg)   SpO2 97%   BMI 19.79 kg/m²     Oxygen Saturation Interpretation: Normal    The patients available past medical records and past encounters were reviewed. ------------------------------ ED COURSE/MEDICAL DECISION MAKING----------------------  Medications - No data to display          Medical Decision Making:   Labs and imaging reviewed, patient will be admitted for neuro eval    Counseling: The emergency provider has spoken with the patient and discussed todays results, in addition to providing specific details for the plan of care and counseling regarding the diagnosis and prognosis. Questions are answered at this time and they are agreeable with the plan.       --------------------------------- IMPRESSION AND DISPOSITION ---------------------------------    IMPRESSION  1. Muscle weakness of extremity        DISPOSITION  Disposition: Admit to telemetry  Patient condition is stable        NOTE: This report was transcribed using voice recognition software.  Every effort was made to ensure accuracy; however, inadvertent computerized transcription errors may be present        Nicolle Booth MD  04/08/21 8546

## 2021-04-06 NOTE — ED NOTES
Bed: 06  Expected date:   Expected time:   Means of arrival:   Comments:  triage     Colette Dean RN  04/06/21 1201

## 2021-04-07 NOTE — PROGRESS NOTES
Physical Therapy  Physical Therapy Initial Assessment     Name: Beatriz Augustin  :   MRN: [id number]    Referring Provider: Annmarie Blizzard, PA    Date of Service: 2021    Evaluating PT: Yassine Doty PT, DPT, [id number]    Room #: 1476/6404-L  Diagnosis: Weakness  PMHx/PSHx: OA, HTN, blurred vision, gout, CAD, ischemic cardiomyopathy, NSVT, valvular heart disease, PAD  Precautions: Fall risk, B LE tone    SUBJECTIVE:    Pt lives with sister in a 2 story house with ramped entry. Bed is on first floor and half bath on first floor. Pt spongebathes on the first floor. Pt only ambulated short distances with [employer] prior to admission. OBJECTIVE:   Initial Evaluation  Date: 21 Treatment Date: Short Term/ Long Term   Goals   AM-PAC 6 Clicks      Was pt agreeable to Eval/treatment? Yes     Does pt have pain? No current complaints of pain     Bed Mobility  Rolling: NT  Supine to sit: NT  Sit to supine: Min A  Scooting: SBA to center of bed  Rolling: Independent   Supine to sit: Independent   Sit to supine: Independent   Scooting: Independent    Transfers Sit to stand: Mod A  Stand to sit: Mod A  Stand pivot: Max A without AD  Sit to stand: SBA  Stand to sit: SBA  Stand pivot: SBA with [employer]   Ambulation   Unable to ambulate due to B LE spasticity  >10 feet with WW with Min A   Stair negotiation: ascended and descended NT  NA   ROM BUE: Refer to OT note  BLE: NT     Strength BUE: Refer to OT note  BLE: NT     Balance Sitting EOB: SBA  Dynamic Standing: Max A without AD  Sitting EOB: Independent   Dynamic Standing: Min A with [employer]     Pt is A & O x: 4 to person, place, month/year, and situation. Sensation: Pt denies numbness and tingling of extremities. Edema: Unremarkable. Patient education  Pt educated on [employer] technique.     Patient response to education:   Pt verbalized understanding Pt demonstrated skill Pt requires further education in this area   Yes No Yes     ASSESSMENT:    Comments:   Pt was in chair upon room entry, agreeable to PT evaluation. Pt completed x2 transfers to [employer]. Pt's L LE was spastic into extension and pt crosses extremity over R foot when standing. Pt is unable to correct/place foot on floor and requested to sit. Pt tolerated standing for approximately 2 minutes. Pt completed second sit to stand transfer with similar results. Pt reports he is unable to control spasticity and symptoms occur randomly. Pt reports he is ambulatory when spasticity is not occurring. Pt was assisted with stand pivot back to bed without AD. Pt was only able to advance legs minimally. Pt was assisted back to supine. Pt was left in bed with all needs met at conclusion of session. Treatment:  Patient practiced and was instructed in the following treatment:     Therapeutic activities: Pt completed all therapeutic activities noted above. Pt was cued for technique during bed mobility transfers. Pt was cued for hand placement during sit <> stand transfers. Pt completed x3 transfers from chair. Pt was cued for hand placement during stand pivot transfer to EOB. Pt's/family goals:   1. To return home. Patient and or family understand(s) diagnosis, prognosis, and plan of care. Yes. PLAN:    Current Treatment Recommendations   [] Strengthening     [x] ROM   [x] Balance Training   [] Endurance Training   [x] Transfer Training   [x] Gait Training   [] Stair Training   [x] Positioning   [x] Safety and Education Training   [] Patient/Caregiver Education   [] HEP  [] Other     PT care will be provided in accordance with the objectives noted above. Exercises and functional mobility practice will be used as well as appropriate assistive devices or modalities to obtain goals. Patient and family education will also be administered as needed. Frequency of treatments: 2-5x/week x 3-5 days.     Time in: 0916  Time out: 0930    Total Treatment Time 10 minutes     Evaluation Time includes thorough review of current medical

## 2021-04-07 NOTE — PROGRESS NOTES
Patient was sitting In chair for dinner and sister in law was in room visiting patient. She attempted to help him back in bed and was unsuccessful. Patient was lowered to the ground. No injury occurred. Vitals stable. Patient assisted back into bed. He stated \"his legs gave out\". Doctor notified of fall. Bed alarm on. Will continue to monitor and assess.

## 2021-04-07 NOTE — CONSULTS
David Woods is a 69 y.o. male       Chief Complaint   Patient presents with    Extremity Weakness     states he has a mobility issue with both upper and lower extremities since the middle of march       HPI:  67-year-old man is presenting with progressive weakness and difficulty with ambulation. The been able to get into see a neurologist in a timely manner because it affecting his walking he presented to the emergency department for further evaluation. He denies any trouble with swallowing or speaking or other bulbar symptoms. He feels like his hands get tight and has difficulty relaxing them. He also has stiffness in the bilateral legs and his legs tend to cross. He denies any neck pain. HPI  Prior to Visit Medications    Medication Sig Taking?  Authorizing Provider   warfarin (COUMADIN) 1 MG tablet Take 1 mg by mouth daily Yes Historical Provider, MD   Cholecalciferol (VITAMIN D3) 50 MCG (2000) CAPS Take by mouth daily Yes Historical Provider, MD   bumetanide (BUMEX) 1 MG tablet TAKE ONE TABLET BY MOUTH DAILY AS NEEDED (WEIGHT GAIN, SHORTNESS OF BREATH, SWELLING) Yes Jose L Lyons MD   sacubitril-valsartan (ENTRESTO) 24-26 MG per tablet Take 1 tablet by mouth nightly Yes CHRISTI Chinchilla - CNP   atorvastatin (LIPITOR) 40 MG tablet Take 1 tablet by mouth nightly Yes Wood Mraquez MD   metoprolol succinate (TOPROL XL) 25 MG extended release tablet Take 1 tablet by mouth daily Yes Wood Marquez MD   citalopram (CELEXA) 10 MG tablet Take 10 mg by mouth daily Yes Historical Provider, MD   allopurinol (ZYLOPRIM) 300 MG tablet TAKE ONE TABLET BY MOUTH EVERY DAY Yes Kayode Banuelos,      Social History     Tobacco Use    Smoking status: Former Smoker     Packs/day: 0.25     Years: 50.00     Pack years: 12.50     Types: Cigarettes     Quit date: 2015     Years since quittin.9    Smokeless tobacco: Never Used   Substance Use Topics    Alcohol use: Not Currently    Drug use: normal.      Pupils: Pupils are equal, round, and reactive to light. Pulmonary:      Breath sounds: Normal breath sounds. Neurological:      Deep Tendon Reflexes:      Reflex Scores:       Tricep reflexes are 3+ on the right side and 3+ on the left side. Bicep reflexes are 3+ on the right side and 3+ on the left side. Brachioradialis reflexes are 3+ on the right side and 3+ on the left side. Patellar reflexes are 3+ on the right side and 3+ on the left side. Achilles reflexes are 2+ on the right side and 2+ on the left side. Psychiatric:         Mood and Affect: Mood normal.         Speech: Speech normal.                 Neurological Exam  Mental Status  Awake. Recent and remote memory are intact. Speech is normal. Language is fluent with no aphasia. Attention and concentration are normal.    Cranial Nerves  CN II: Visual fields full to confrontation. CN III, IV, VI: Extraocular movements intact bilaterally. Normal lids and orbits bilaterally. Pupils equal round and reactive to light bilaterally. CN V: Facial sensation is normal.  CN VII: Full and symmetric facial movement. CN VIII: Hearing is normal.  CN IX, X: Palate elevates symmetrically  CN XI: Shoulder shrug strength is normal.  CN XII: Tongue midline without atrophy or fasciculations. Motor  Decreased muscle bulk throughout. Increased muscle tone. No abnormal involuntary movements. Diffuse weakness with impaired dexterity in the hands at least antigravity strength throughout. [place] Spastic tone throughout. .    Sensory  Light touch is normal in upper and lower extremities. Pinprick is normal in upper and lower extremities. Sensation: Objective numbness in the hands.      Reflexes                                           Right                      Left  Brachioradialis                    3+                         3+  Biceps                                 3+                         3+  Triceps                                3+ 3+  Hamstring                            3+                         3+  Patellar                                3+                         3+  Achilles                                2+                         2+  Plantar                           Upgoing                Upgoing    Coordination  Impaired finger-nose and heel-to-shin bilaterally. Laboratory/Radiology:     CBC with Differential:    Lab Results   Component Value Date    WBC 5.9 04/07/2021    RBC 3.17 04/07/2021    HGB 10.2 04/07/2021    HCT 30.3 04/07/2021     04/07/2021    MCV 95.6 04/07/2021    MCH 32.2 04/07/2021    MCHC 33.7 04/07/2021    RDW 15.8 04/07/2021    SEGSPCT 60 09/11/2013    LYMPHOPCT 21.5 04/07/2021    MONOPCT 8.5 04/07/2021    BASOPCT 0.3 04/07/2021    MONOSABS 0.50 04/07/2021    LYMPHSABS 1.26 04/07/2021    EOSABS 0.08 04/07/2021    BASOSABS 0.02 04/07/2021     CMP:    Lab Results   Component Value Date     04/07/2021    K 4.3 04/07/2021     04/07/2021    CO2 24 04/07/2021    BUN 13 04/07/2021    CREATININE 0.8 04/07/2021    GFRAA >60 04/07/2021    LABGLOM >60 04/07/2021    GLUCOSE 88 04/07/2021    GLUCOSE 101 09/21/2011    PROT 7.3 04/06/2021    LABALBU 4.3 04/06/2021    LABALBU 4.8 09/21/2011    CALCIUM 9.1 04/07/2021    BILITOT 0.7 04/06/2021    ALKPHOS 178 04/06/2021    AST 30 04/06/2021    ALT 23 04/06/2021       CT head:  Negative   I independently reviewed the labs and imaging studies at today's appointment. Assessment:       Suspect patient has cervical myelopathy. Motor neuron disease can also be considered if there is no evidence of cervical pathology.        Plan:     Follow-up MRI of the C-spine  PT GALDINO    Ushamilady Avila  1:23 PM  4/7/2021

## 2021-04-07 NOTE — PROGRESS NOTES
OCCUPATIONAL THERAPY INITIAL EVALUATION      Date:2021  Patient Name: Deshaun Shine  MRN: [id number]  : 1950  Room: [address] [place] [address]-B      [address], OTR/L [id number]    AM-PAC Daily Activity Raw Score:   Recommended Adaptive Equipment: TBD     Diagnosis: Weakness [R53.1]     Referring physician: BORA Smith    Pertinent Medical History: CAD, gout, HFrEF, HTN, ischemic cardiomyopathy, NSVT, OA, PAD    Precautions:  Falls, increased tone B LE's, bed alarm     Home Living: Pt lives with sister in 2 floor home. Ramped entry  Half bath and bedroom on 1st floor - pt has remained on 1st floor recently  Bathroom setup: sponge bathes in half bath on 1st floor   Equipment owned: w/c, w/w, cane    Prior Level of Function: assist PRN with ADLs , assistance with IADLs; ambulated w/ quad cane.  Per pt, has been utilizing w/c recently d/t weakness  Driving: no    Pain Level: Pt c/o 4-5/10 back pain this session ; reinforced pain management strategies    Cognition: A&O: 3/4; Follows 1-2 step directions   Memory:  fair    Sequencing:  fair    Problem solving:  fair -   Judgement/safety:  fair -     Functional Assessment:   Initial Eval Status  Date: 21 Treatment Status  Date: Short Term Goals/LTG  Treatment frequency: 1-4x/wk   Feeding Stand by Assist   To grasp and maintain various items  Modified Hunt    Grooming Minimal Assist   Modified Hunt    UB Dressing Minimal Assist   Donned/doffed gown  Modified Hunt    LB Dressing Dependent  B socks  Minimal Assist    Bathing Moderate Assist  Minimal Assist    Toileting Maximal Assist   Minimal Assist    Bed Mobility  NT  Pt seated in chair at room entry and departure  Rolling: Independent   Supine to sit: Modified Hunt   Sit to supine: Modified Hunt    Functional Transfers Mod A  SBA   Functional Mobility NT  Deferred for safety reasons and d/t increased B LE tone, LE coordination deficits  Min A   Balance function, completion of standardized testing/informal observation of tasks, assessment of data and education on plan of care and goals. Assessment of current deficits   Functional mobility [x]  ADLs [x] Strength [x]  Cognition []  Functional transfers  [x] IADLs [x] Safety Awareness [x]  Endurance [x]  Fine Motor Coordination [x] Balance [x] Vision/perception [] Sensation []   Gross Motor Coordination [x] ROM [] Delirium []                  Motor Control []    Plan of Care: 1-3 days/week for 1-2 weeks PRN   Instruction/training on adapted ADL techniques and AE recommendations to increase functional independence within precautions  Training on energy conservation strategies/techniques to improve independence/tolerance for self-care routine  Functional transfer/mobility training/DME recommendations for increased independence, safety, and fall prevention  Patient/Family education to increase follow through with safety techniques and functional independence  Recommendation of environmental modifications for increased safety with functional transfers/mobility and ADLs  Therapeutic exercise to improve motor endurance, ROM, and functional strength for ADLs/functional transfers  Therapeutic activities to facilitate/challenge dynamic balance, stand tolerance, fine motor dexterity/in-hand manipulation for increased independence with ADLs  Positioning to improve functional independence    Rehab Potential: Good for established goals    Patient / Family Goal: Not stated     Patient and/or family were instructed on diagnosis, prognosis/goals and plan of care. Demonstrated fair understanding. [] Malnutrition indicators have been identified and nursing has been notified to ensure a dietitian consult is ordered.        Mod Evaluation completed +              Time In: 08:20  Time Out: 08:49  Total Treatment Time: 10 minutes   Min Units   OT Eval Low 04644     OT Eval Medium 97166 X 1   OT Eval High 21591     OT Re-Eval [id number]

## 2021-04-07 NOTE — H&P
[address] [facility]  History and Physical      CHIEF COMPLAINT: Extremity weakness       Patient of Colletta Register, DO presents with:  Weakness    History of Present Illness:   Patient is a 69year old male with a past medical history of CAD, Gout, HTN, and CHF. Patient presented to the ER with complaints of weakness. Patient states his weakness has progressed since January. Patient admits of falling x2 in the past two weeks. Patient uses a cane to walk with. Patient has been to several doctors and [facility] ER for the same problem. Patient has lost his life partner and is teary eyed speak of it. On examination patient has no facial droop, slurred speech, or weakness. Patient does have gait problem and complains of numbness. Patient will be admitted observation for further testing and treatment. REVIEW OF SYSTEMS:  Pertinent negatives are above in HPI. 10 point ROS otherwise negative.       Past Medical History:   Diagnosis Date    Blurred vision, right eye 2007    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,     CAD (coronary artery disease)     Gout     HFrEF (heart failure with reduced ejection fraction) ([facility] 08/19/2020 8/13/2020- echo- LVEF 15-20%, stage II DD, RVSF low normal, moderate-severe MR, moderate TR, moderate MS    HFrEF (heart failure with reduced ejection fraction) ([address]     History of cardiovascular stress test 07/2015    Lexiscan stress test    HLD (hyperlipidemia)     Hypertension     Ischemic cardiomyopathy     Kidney stone     stent/    NSVT (nonsustained ventricular tachycardia) (HCC)     Osteoarthritis     PAD (peripheral artery disease) ([facility]     Valvular heart disease          Past Surgical History:   Procedure Laterality Date    CARDIAC CATHETERIZATION  08/18/2020    CARDIAC DEFIBRILLATOR PLACEMENT  02/19/2021    CRT-D      ([facility])    DR. Natalia Perez    LITHOTRIPSY      MANDIBLE SURGERY      OTHER SURGICAL HISTORY Left 05/08/2014    EXCISION OF OLECRANON BURSA weakness. 1.  Telemetry monitoring  2. Neurochecks every 4 hours  3. Consult neurology  4. Medication for other comorbidities continue as appropriate with dosage adjustment as necessary. DVT prophylaxis  PT OT  Discharge planning  Case discussed with attending and agreed upon plan of care. Code status: Full  Requires inpatient level of care  Sangeeta Oconnor APRN-CNP  7:15 AM  4/7/2021     Admitted for evaluation of weakness, generalized fatigue  IV fluid hydration  Hold anticoagulation for a couple of days prior to resuming, INR 5.3-no need to reverse, no active bleed  Would recommend weekly INR checks in order to avoid persistent supratherapeutic levels  From a medicine standpoint, he is okay for discharge later today once MRI obtained to rule out stroke    I personally saw, examined and provided care for the patient. Radiographs, labs and medication list were reviewed by me independently. The case was discussed in detail and plans for care were established. Review of [address] [place] [address], documentation was conducted and revisions were made as appropriate directly by me. I agree with the above documented exam, problem list, and plan of care.      Clyde Bennett MD  11:09 AM  4/7/2021

## 2021-04-07 NOTE — PROGRESS NOTES
Called patients sister in law. Went over home medications with her. Med rec is complete. All questions and concerns were answered.

## 2021-04-07 NOTE — CARE COORDINATION
SW spoke with patient in his room at length. He states he lives home alone in a 2 story home, however Has his sister in law staying with him all night every night and a friend stays during the day, so he is never alone. Patient was very tearful throughout conversation. He states he thought he was going home today and now found out he is not. He also states he had been in a long term relationship with his Partner, Yuli Duffy for 40 years and he passed away in May of 2020, since that time his health has been declining. He states he is very broken hearted, support offered. Patient states he lives in a 2 story home and had been doing the stairs but recently since January has stayed on the first floor only. He reports he has a wheelchair, rollator and recently had a ramp put on the front entrance of the home. His PCP is Dr Jacklin Najjar. He states no history of S[facility] or HHC. We talked about transition of care at length. He would like to return home but wants HHC. We discussed options for [facility] and he states he would like [facility]. Referral to Joanna. Will need orders prior to discharge. The Plan for Transition of Care is related to the following treatment goals: continued therapy at home. The Patient was provided with a choice of provider and agrees   with the discharge plan. [x] Yes [] No    Freedom of choice list was provided with basic dialogue that supports the patient's individualized plan of care/goals, treatment preferences and shares the quality data associated with the providers.  [x] Yes [] No

## 2021-04-08 NOTE — PLAN OF CARE
Problem: Skin Integrity:  Goal: Will show no infection signs and symptoms  Description: Will show no infection signs and symptoms  4/8/2021 1642 by Shannan Diamond RN  Outcome: Met This Shift  4/8/2021 1609 by Shannan Diamond RN  Outcome: Met This Shift  Goal: Absence of new skin breakdown  Description: Absence of new skin breakdown  4/8/2021 1642 by Shannan Diamond RN  Outcome: Met This Shift  4/8/2021 1609 by Shannan Diamond RN  Outcome: Met This Shift     Problem: Falls - Risk of:  Goal: Will remain free from falls  Description: Will remain free from falls  4/8/2021 1642 by Shannan Diamond RN  Outcome: Met This Shift  4/8/2021 1609 by Shannan Diamond RN  Outcome: Met This Shift  Goal: Absence of physical injury  Description: Absence of physical injury  4/8/2021 1642 by Shannan Diamond RN  Outcome: Met This Shift  4/8/2021 1609 by Shannan Diamond RN  Outcome: Met This Shift

## 2021-04-08 NOTE — PLAN OF CARE
Problem: Skin Integrity:  Goal: Will show no infection signs and symptoms  Description: Will show no infection signs and symptoms  4/8/2021 1609 by Nidhi Blank RN  Outcome: Met This Shift  4/8/2021 0216 by Yg Ramon RN  Outcome: Met This Shift  Goal: Absence of new skin breakdown  Description: Absence of new skin breakdown  4/8/2021 1609 by Nidhi Blank RN  Outcome: Met This Shift  4/8/2021 0216 by Yg Ramon RN  Outcome: Met This Shift     Problem: Falls - Risk of:  Goal: Will remain free from falls  Description: Will remain free from falls  4/8/2021 1609 by Nidhi Blank RN  Outcome: Met This Shift  4/8/2021 0216 by Yg Ramon RN  Outcome: Met This Shift  Goal: Absence of physical injury  Description: Absence of physical injury  4/8/2021 1609 by Nidhi Blank RN  Outcome: Met This Shift  4/8/2021 0216 by Yg Ramon RN  Outcome: Met This Shift

## 2021-04-08 NOTE — PROGRESS NOTES
Yang May is a 69 y.o. right handed male     Neurology following for extremity weakness     PMH of HTN, HLD, depression, gout     Presented to ED with progressive weakness and difficulty with ambulation. These symptoms started middle of March and have progressed . He recently had a pacemaker insertion middle of March and symptoms occurred shortly after. CTH was negative for acute findings. MRI C spine is pending    He has numbness in his BUE with weakness and weakness to his RLE. His RLE will randomly jerk and he has no control over it. He uses multiple ambulatory devices. He had a fall yesterday trying to get back into bed from the chair. He did not hit his head.       No chest pain or palpitations  No coughing or wheezing    No vertigo, lightheadedness or loss of consciousness  + falls, tripping and stumbling  No incontinence of bowels or bladder  No itching or bruising appreciated    ROS otherwise negative      Objective:     /73   Pulse 77   Temp 97.3 °F (36.3 °C) (Oral)   Resp 18   Ht 5' 9\" (1.753 m)   Wt 131 lb (59.4 kg)   SpO2 99%   BMI 19.35 kg/m²     General appearance: alert, appears stated age, cooperative and no distress  Head: normocephalic, without obvious abnormality, atraumatic  Eyes: conjunctivae/corneas clear  Neck: symmetrical, trachea midline   Back: large bony spur in lower thoracic area   Lungs: respirations non labored   Pulses: 2+ and symmetric  Skin: color, texture, turgor normal---no rashes or lesions      Mental Status: Alert, oriented, thought content appropriate     Appropriate attention/concentration  Intact fundus of knowledge  Repetition intact  Intact memories      Speech: no dysarthria  Language: no aphasias     Cranial Nerves:  I: smell NA   II: visual acuity  NA   II: visual fields Full to confrontation   II: pupils REBECCA   III,VII: ptosis None   III,IV,VI: extraocular muscles  Full ROM   V: mastication Normal   V: facial light touch sensation Normal   V,VII: corneal reflex     VII: facial muscle function - upper  Normal   VII: facial muscle function - lower Normal   VIII: hearing Normal   IX: soft palate elevation  Normal   IX,X: gag reflex    XI: trapezius strength  5/5   XI: sternocleidomastoid strength 5/5   XI: neck extension strength  5/5   XII: tongue strength  Normal     Motor:  4-/5 BUE strength, LLE 4/5 and RLE 3/5 strength  Decreased bulk and increased tone to extremities   + jerking uncontrolled to RLE   Bilateral hands appear to be yadira     Sensory:  LT and PP normal  Vibration decreased in all extremities but not felt in RLE     Coordination:   FN, FFM and MEGHAN ataxic  HS unable to perform    DTR:   Right Brachioradialis reflex 2+  Left Brachioradialis reflex 2+  Right Biceps reflex 2+  Left Biceps reflex 2+  Right Triceps reflex 2+  Left Triceps reflex 2+  Right Quadriceps reflex 4+  Left Quadriceps reflex 3+  Right Achilles reflex 2+  Left Achilles reflex 2+    + Babinskis    Laboratory/Radiology:     CBC with Differential:    Lab Results   Component Value Date    WBC 5.9 04/07/2021    RBC 3.17 04/07/2021    HGB 10.2 04/07/2021    HCT 30.3 04/07/2021     04/07/2021    MCV 95.6 04/07/2021    MCH 32.2 04/07/2021    MCHC 33.7 04/07/2021    RDW 15.8 04/07/2021    SEGSPCT 60 09/11/2013    LYMPHOPCT 21.5 04/07/2021    MONOPCT 8.5 04/07/2021    BASOPCT 0.3 04/07/2021    MONOSABS 0.50 04/07/2021    LYMPHSABS 1.26 04/07/2021    EOSABS 0.08 04/07/2021    BASOSABS 0.02 04/07/2021     CMP:    Lab Results   Component Value Date     04/07/2021    K 4.3 04/07/2021     04/07/2021    CO2 24 04/07/2021    BUN 13 04/07/2021    CREATININE 0.8 04/07/2021    GFRAA >60 04/07/2021    LABGLOM >60 04/07/2021    GLUCOSE 88 04/07/2021    GLUCOSE 101 09/21/2011    PROT 7.3 04/06/2021    LABALBU 4.3 04/06/2021    LABALBU 4.8 09/21/2011    CALCIUM 9.1 04/07/2021    BILITOT 0.7 04/06/2021    ALKPHOS 178 04/06/2021    AST 30 04/06/2021    ALT 23 04/06/2021     Hepatic Function Panel:    Lab Results   Component Value Date    ALKPHOS 178 04/06/2021    ALT 23 04/06/2021    AST 30 04/06/2021    PROT 7.3 04/06/2021    BILITOT 0.7 04/06/2021    LABALBU 4.3 04/06/2021    LABALBU 4.8 09/21/2011     HgBA1c:    Lab Results   Component Value Date    LABA1C 6.0 08/15/2020     FLP:    Lab Results   Component Value Date    TRIG 34 03/17/2021    HDL 70 03/17/2021    LDLCALC 64 03/17/2021    LABVLDL 7 03/17/2021     CTH: negative for acute findings    All labs and imaging studies reviewed independently today    Assessment:     Suspected cervical myelopathy vs motor neuron disease  --- uncontrollable movements in RLE  --- reflexes increased in RLE     Plan:     MRI C and T spine pending  Neurology to follow       CHRISTI Mcgraw CNP  1:21 PM  4/8/2021

## 2021-04-08 NOTE — CARE COORDINATION
Received return call from Joanna at [facility]. They are able to accept. Need [facility] orders prior to discharge. Plan remains home with [facility].

## 2021-04-09 PROBLEM — I50.20 HFREF (HEART FAILURE WITH REDUCED EJECTION FRACTION) (HCC): Chronic | Status: ACTIVE | Noted: 2020-08-19

## 2021-04-09 PROBLEM — M48.02 CERVICAL SPINAL STENOSIS: Status: ACTIVE | Noted: 2021-01-01

## 2021-04-09 NOTE — PLAN OF CARE
Problem: Skin Integrity:  Goal: Will show no infection signs and symptoms  Description: Will show no infection signs and symptoms  4/9/2021 0025 by Dianelys Gonzalez RN  Outcome: Met This Shift  4/8/2021 1642 by Yoly Escobar RN  Outcome: Met This Shift  4/8/2021 1609 by Yoly Escobar RN  Outcome: Met This Shift  Goal: Absence of new skin breakdown  Description: Absence of new skin breakdown  4/9/2021 0025 by Dianelys Gonzalez RN  Outcome: Met This Shift  4/8/2021 1642 by Yoly Escobar RN  Outcome: Met This Shift  4/8/2021 1609 by Yoly Escobar RN  Outcome: Met This Shift     Problem: Falls - Risk of:  Goal: Will remain free from falls  Description: Will remain free from falls  4/9/2021 0025 by Dianelys Gonzalez RN  Outcome: Met This Shift  4/8/2021 1642 by Yoly Escobar RN  Outcome: Met This Shift  4/8/2021 1609 by Yoly Escobar RN  Outcome: Met This Shift  Goal: Absence of physical injury  Description: Absence of physical injury  4/9/2021 0025 by Dianelys Gonzalez RN  Outcome: Met This Shift  4/8/2021 1642 by Yoly Escobar RN  Outcome: Met This Shift  4/8/2021 1609 by Yoly Escobar RN  Outcome: Met This Shift

## 2021-04-09 NOTE — PROGRESS NOTES
Pharmacy Consultation Note  (Anticoagulant Dosing and Monitoring)    Initial consult date: 4/9/21   Consulting physician: Dr. Daniel Chopra    Allergies:  Patient has no known allergies. 69 y.o. male      Ht Readings from Last 1 Encounters:   04/06/21 5' 9\" (1.753 m)     Wt Readings from Last 1 Encounters:   04/09/21 132 lb 4.4 oz (60 kg)         Warfarin Indication Target   INR Range Home Dose  (if applicable) Diet/Feeding Tube   Mural thrombus in August 2020 2-3 5mg  General       Vitamin K or Blood product  Administration Date                 Warfarin drug-drug interactions  Start  Stop Home Med?  Comments                                 TSH:    Lab Results   Component Value Date    TSH 2.190 12/06/2017        Hepatic Function Panel:                            Lab Results   Component Value Date    ALKPHOS 178 04/06/2021    ALT 23 04/06/2021    AST 30 04/06/2021    PROT 7.3 04/06/2021    BILITOT 0.7 04/06/2021    LABALBU 4.3 04/06/2021    LABALBU 4.8 09/21/2011       Date Warfarin Dose INR Heparin or LMWH HBG/HCT PLT Comment   4/9 5mg 1.7 LMWH 60mg BID -- --                                          Assessment and Plan:  · 70 yo male on warfarin PTA for mural thrombus in August 2020  · Home dose: 5mg daily  · INR on admission was 5.3; warfarin has been on hold  · Therapeutic enoxaparin started today  · INR today = 1.7  · Warfarin 5mg tonight  · Daily PT/INR until the INR is stable within the therapeutic range  · Pharmacist will follow and monitor/adjust dosing as necessary    Jonelle Coles, PharmD, [facility] 4/9/2021 2:39 PM

## 2021-04-09 NOTE — PROGRESS NOTES
Chief Complaint:  Chief Complaint   Patient presents with    Extremity Weakness     states he has a mobility issue with both upper and lower extremities since the middle of march     Weakness     Subjective:    No new complaints. Objective:    /77   Pulse 97   Temp 98.2 °F (36.8 °C) (Temporal)   Resp 18   Ht 5' 9\" (1.753 m)   Wt 132 lb 4.4 oz (60 kg)   SpO2 99%   BMI 19.53 kg/m²     Current medications that patient is taking have been reviewed. General appearance: NAD, conversant  HEENT: AT/NC, MMM  Neck: FROM, supple  Lungs: Clear to auscultation, WOB normal  CV: RRR, no MRGs  Abdomen: Soft, non-tender; no masses or HSM, +BS  Extremities: No peripheral edema or digital cyanosis  Skin: no rash, lesions or ulcers  Psych: Calm and cooperative  Neuro: Alert and interactive, face symmetric, moving all extremities, speech fluent. Clawed left hand.   R hand functionally seems normal.  Mild weakness RLE and LLE    Labs:  CBC with Differential:    Lab Results   Component Value Date    WBC 5.9 04/07/2021    RBC 3.17 04/07/2021    HGB 10.2 04/07/2021    HCT 30.3 04/07/2021     04/07/2021    MCV 95.6 04/07/2021    MCH 32.2 04/07/2021    MCHC 33.7 04/07/2021    RDW 15.8 04/07/2021    SEGSPCT 60 09/11/2013    LYMPHOPCT 21.5 04/07/2021    MONOPCT 8.5 04/07/2021    BASOPCT 0.3 04/07/2021    MONOSABS 0.50 04/07/2021    LYMPHSABS 1.26 04/07/2021    EOSABS 0.08 04/07/2021    BASOSABS 0.02 04/07/2021     CMP:    Lab Results   Component Value Date     04/07/2021    K 4.3 04/07/2021     04/07/2021    CO2 24 04/07/2021    BUN 13 04/07/2021    CREATININE 0.8 04/07/2021    GFRAA >60 04/07/2021    LABGLOM >60 04/07/2021    GLUCOSE 88 04/07/2021    GLUCOSE 101 09/21/2011    PROT 7.3 04/06/2021    LABALBU 4.3 04/06/2021    LABALBU 4.8 09/21/2011    CALCIUM 9.1 04/07/2021    BILITOT 0.7 04/06/2021    ALKPHOS 178 04/06/2021    AST 30 04/06/2021    ALT 23 04/06/2021        Assessment/Plan:  Principal Problem:    Weakness  Active Problems:    HTN (hypertension)    Apical mural thrombus    Supratherapeutic INR    HFrEF (heart failure with reduced ejection fraction) ([facility])  Resolved Problems:    * No resolved hospital problems. *       MRI today    INR subtherapeutic; mural thrombus in August 2020. Bridge with Lovenox while we await INR to become therapeutic again.     BP well controlled    Euvolemic    Requires continued inpatient level of care     Mireya Valera    2:29 PM  4/9/2021

## 2021-04-09 NOTE — CARE COORDINATION
[facility] following for discharge at this time and will need Orders prior to discharge. Patient Pending MRI at 2p today. Await MRI and results.

## 2021-04-10 NOTE — DISCHARGE SUMMARY
reconstruction, and/or weight based adjustment of the mA/kV was utilized to reduce the radiation dose to as low as reasonably achievable. COMPARISON: CT head without contrast, 03/29/2021 HISTORY: ORDERING SYSTEM PROVIDED HISTORY: extremity weakness TECHNOLOGIST PROVIDED HISTORY: Has a \"code stroke\" or \"stroke alert\" been called? ->No Reason for exam:->extremity weakness Decision Support Exception->Emergency Medical Condition (MA) What reading provider will be dictating this exam?->CRC FINDINGS: BRAIN/VENTRICLES: No mass effect, edema or hemorrhage is seen. Moderate volume loss is seen in the brain with mild chronic microvascular ischemic changes. No hydrocephalus or extra-axial fluid is seen. ORBITS: The visualized portion of the orbits demonstrate no acute abnormality. SINUSES: The visualized paranasal sinuses and mastoid air cells demonstrate no acute abnormality. SOFT TISSUES/SKULL:  No acute abnormality of the visualized skull or soft tissues. No acute intracranial abnormality. Discharge Exam:    HEENT: NCAT,  PERRLA, No JVD  Heart:  RRR, no murmurs, gallops, or rubs.   Lungs:  CTA bilaterally, no wheeze, rales or rhonchi  Abd: bowel sounds present, nontender, nondistended, no masses  Extrem:  No clubbing, cyanosis, or edema    Disposition: home with home health care    Patient Condition at Discharge: Stable    Patient Instructions:      Medication List      START taking these medications    rOPINIRole 0.5 MG tablet  Commonly known as: REQUIP  Take 1 tablet by mouth 3 times daily        CONTINUE taking these medications    allopurinol 300 MG tablet  Commonly known as: ZYLOPRIM  TAKE ONE TABLET BY MOUTH EVERY DAY     atorvastatin 40 MG tablet  Commonly known as: LIPITOR  Take 1 tablet by mouth nightly     bumetanide 1 MG tablet  Commonly known as: BUMEX  TAKE ONE TABLET BY MOUTH DAILY AS NEEDED (WEIGHT GAIN, SHORTNESS OF BREATH, SWELLING)     citalopram 10 MG tablet  Commonly known as: Praful Mcintyre Entresto 24-26 MG per tablet  Generic drug: sacubitril-valsartan  Take 1 tablet by mouth nightly     metoprolol succinate 25 MG extended release tablet  Commonly known as: TOPROL XL  Take 1 tablet by mouth daily     Vitamin D3 50 MCG (2000 UT) Caps     warfarin 1 MG tablet  Commonly known as: COUMADIN           Where to Get Your Medications      These medications were sent to [facility] [address] [facility] [address]    Phone: [phone]   · rOPINIRole 0.5 MG tablet       Activity: activity as tolerated  Diet: regular diet    Pt has been advised to: Follow-up with [facility], DO in 1 week.   Follow-up with consultants as recommended by them    Note that over 30 minutes was spent in preparing discharge papers, discussing discharge with patient, medication review, etc.    Signed:  Jorge Chery MD  4/10/2021  1:23 PM

## 2021-04-10 NOTE — CARE COORDINATION
[facility] notified of discharge today and homecare orders for PT/OT. For questions I can be reached at [phone].  Kendall PylePhoebe [facility]

## 2021-04-28 NOTE — CONSULTS
[address]                  [address] [facility],  [facility]                                  CONSULTATION    PATIENT NAME: Radha Douglas                     :        1950  MED REC NO:   [id number]                            ROOM:       8517  ACCOUNT NO:   [de-identified]                           ADMIT DATE: 2021  PROVIDER:     Dulce Bamberger, MD    CONSULT DATE:  04/10/2021    REASON FOR CONSULTATION:  Critical cervical stenosis. HISTORY OF PRESENT ILLNESS:  This gentleman presented with severe  weakness of the upper and lower extremities. His admitting H and P was  reviewed and will not be reiterated. The cervical MRI of the neck was  reviewed with him. I counseled him greater than 50% of the encounter  time, answered all his questions. His diagnosis is critical cervical  stenosis with cord compression. He gives informed refusal to consider  surgery. He is on Coumadin. He notes that his Coumadin has to be held  prior to any surgical considerations or reversed. For now, he wants to  go home. He is adamant. His diagnosis is critical cervical stenosis. He actually needs surgery but has refused to consider. He is to follow  up with me in the office and we will either arrange for this to be done  in [facility] [employer] or schedule it here. He is to hold his Coumadin for now.         Felipe Farah MD    D: 2021 13:22:36       T: 2021 13:30:18     [email or web address]  Job#: [id number]     Doc#: [id number]    CC:

## 2021-05-26 NOTE — TELEPHONE ENCOUNTER
Patient called to let us know he was in [facility] for 8 weeks. Patient will call back to schedule missed appt with Dr Salazar Alejandra.

## 2021-06-07 NOTE — PROGRESS NOTES
physical and occupational therapy. He is using a walker and a wheelchair to ambulate. He denies chest discomfort. He feels tired with no dyspnea on exertion at his level of activity. He denies palpitations, dizziness, syncope or lower extremity edema. EKG done today revealed ventricular paced rhythm at 73 bpm.    Review of Systems:   Review of Systems   Constitutional: Negative for chills, fatigue and fever. HENT: Negative for nosebleeds. Respiratory: Negative for cough, shortness of breath and wheezing. Gastrointestinal: Negative for abdominal pain, blood in stool, constipation, diarrhea, nausea and vomiting. Genitourinary: Negative for dysuria and hematuria. Musculoskeletal: Negative for back pain, joint swelling and myalgias. Neurological: Negative for syncope and light-headedness. Stiffness in upper extremities. Psychiatric/Behavioral: The patient is not nervous/anxious.            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,     CAD (coronary artery disease)     Gout     HFrEF (heart failure with reduced ejection fraction) ([facility] [address] 08/19/2020 8/13/2020- echo- LVEF 15-20%, stage II DD, RVSF low normal, moderate-severe MR, moderate TR, moderate FL    HFrEF (heart failure with reduced ejection fraction) ([address]     History of cardiovascular stress test 07/2015    Lexiscan stress test    HLD (hyperlipidemia)     Hypertension     Ischemic cardiomyopathy     Kidney stone     stent/    NSVT (nonsustained ventricular tachycardia) (HCC)     Osteoarthritis     PAD (peripheral artery disease) ([facility] [address]     Valvular heart disease        Past Surgical History:  Past Surgical History:   Procedure Laterality Date    CARDIAC CATHETERIZATION  08/18/2020    CARDIAC DEFIBRILLATOR PLACEMENT  02/19/2021    CRT-D      ([facility])    DR. Nico Sibley    LITHOTRIPSY      MANDIBLE SURGERY      OTHER SURGICAL HISTORY Left 2014    EXCISION OF OLECRANON BURSA    OTHER SURGICAL HISTORY  2018    Laparoscopic transabdominal right inguinal hernia repair with mesk and Excision of Lipoma of the Cord    TONSILLECTOMY         Family History:  Family History   Problem Relation Age of Onset    Heart Disease Mother     Stroke Mother     Coronary Art Dis Father        Social History:  Social History     Socioeconomic History    Marital status: Single     Spouse name: Not on file    Number of children: Not on file    Years of education: Not on file    Highest education level: Not on file   Occupational History    Not on file   Tobacco Use    Smoking status: Former Smoker     Packs/day: 0.25     Years: 50.00     Pack years: 12.50     Types: Cigarettes     Quit date: 2015     Years since quittin.1    Smokeless tobacco: Never Used   Vaping Use    Vaping Use: Never used   Substance and Sexual Activity    Alcohol use: Not Currently    Drug use: Never    Sexual activity: Not on file   Other Topics Concern    Not on file   Social History Narrative    Drinks occ coffee. Social Determinants of Health     Financial Resource Strain:     Difficulty of Paying Living Expenses:    Food Insecurity:     Worried About Running Out of Food in the Last Year:     [address] in the Last Year:    Transportation Needs:     Lack of Transportation (Medical):      Lack of Transportation (Non-Medical):    Physical Activity:     Days of Exercise per Week:     Minutes of Exercise per Session:    Stress:     Feeling of Stress :    Social Connections:     Frequency of Communication with Friends and Family:     Frequency of Social Gatherings with Friends and Family:     Attends [employer] Services:     Active Member of Clubs or Organizations:     Attends Club or Organization Meetings:     Marital Status:    Intimate Partner Violence:     Fear of Current or Ex-Partner:     Emotionally Abused:     Physically Abused:     Sexually Abused: Allergies:  No Known Allergies    Current Medications:  Current Outpatient Medications   Medication Sig Dispense Refill    rOPINIRole (REQUIP) 0.5 MG tablet Take 1 tablet by mouth 3 times daily 90 tablet 3    warfarin (COUMADIN) 1 MG tablet Take 1 mg by mouth daily      Cholecalciferol (VITAMIN D3) 50 MCG (2000 UT) CAPS Take by mouth daily      bumetanide (BUMEX) 1 MG tablet TAKE ONE TABLET BY MOUTH DAILY AS NEEDED (WEIGHT GAIN, SHORTNESS OF BREATH, SWELLING) 30 tablet 6    sacubitril-valsartan (ENTRESTO) 24-26 MG per tablet Take 1 tablet by mouth nightly 60 tablet 3    atorvastatin (LIPITOR) 40 MG tablet Take 1 tablet by mouth nightly 30 tablet 3    metoprolol succinate (TOPROL XL) 25 MG extended release tablet Take 1 tablet by mouth daily 30 tablet 3    citalopram (CELEXA) 10 MG tablet Take 10 mg by mouth daily      allopurinol (ZYLOPRIM) 300 MG tablet TAKE ONE TABLET BY MOUTH EVERY DAY 30 tablet 5     No current facility-administered medications for this visit. Physical Exam:  Ht 5' 9.5\" (1.765 m)   BMI 19.25 kg/m²   Wt Readings from Last 3 Encounters:   04/09/21 132 lb 4.4 oz (60 kg)   02/19/21 136 lb (61.7 kg)   02/09/21 136 lb (61.7 kg)       Physical Exam  Constitutional:       General: He is not in acute distress. Appearance: He is well-developed. HENT:      Head: Normocephalic and atraumatic. Neck:      Vascular: No carotid bruit or JVD. Cardiovascular:      Rate and Rhythm: Normal rate and regular rhythm. Heart sounds: No murmur heard. No friction rub. No gallop. Pulmonary:      Breath sounds: Normal breath sounds. No wheezing or rales. Chest:      Chest wall: No tenderness. Abdominal:      General: Bowel sounds are normal. There is no distension. Palpations: Abdomen is soft. There is no mass. Tenderness: There is no abdominal tenderness. Comments: No abdominal bruit. Musculoskeletal:      Cervical back: Neck supple. Right lower leg: No edema. Left lower leg: No edema. Skin:     General: Skin is warm and dry. Neurological:      Mental Status: He is alert and oriented to person, place, and time. Laboratory Tests:  Lab Results   Component Value Date    CREATININE 0.8 04/07/2021    BUN 13 04/07/2021     04/07/2021    K 4.3 04/07/2021     04/07/2021    CO2 24 04/07/2021     Lab Results   Component Value Date    MG 2.1 04/06/2021     Lab Results   Component Value Date    WBC 5.9 04/07/2021    HGB 10.2 (L) 04/07/2021    HCT 30.3 (L) 04/07/2021    MCV 95.6 04/07/2021     04/07/2021     Lab Results   Component Value Date    ALT 23 04/06/2021    AST 30 04/06/2021    ALKPHOS 178 (H) 04/06/2021    BILITOT 0.7 04/06/2021     Lab Results   Component Value Date    CKTOTAL 255 (H) 04/06/2021    CKMB 1.7 08/22/2015    TROPONINI <0.01 04/06/2021    TROPONINI <0.01 03/29/2021    TROPONINI 0.05 (H) 08/14/2020     Lab Results   Component Value Date    INR 1.4 04/10/2021    INR 1.7 04/09/2021    INR 2.9 04/08/2021    PROTIME 14.7 (H) 04/10/2021    PROTIME 18.8 (H) 04/09/2021    PROTIME 31.3 (H) 04/08/2021     Lab Results   Component Value Date    TSH 2.190 12/06/2017     Lab Results   Component Value Date    LABA1C 6.0 (H) 08/15/2020     Lab Results   Component Value Date    CHOL 141 03/17/2021    CHOL 182 08/14/2020    CHOL 145 06/23/2020     Lab Results   Component Value Date    TRIG 34 03/17/2021    TRIG 64 08/14/2020    TRIG 44 06/23/2020     Lab Results   Component Value Date    HDL 70 03/17/2021    HDL 55 08/14/2020    HDL 66 06/23/2020     Lab Results   Component Value Date    LDLCALC 64 03/17/2021    LDLCALC 114 (H) 08/14/2020    LDLCALC 70 06/23/2020     Lab Results   Component Value Date    LABVLDL 7 03/17/2021    LABVLDL 13 08/14/2020    LABVLDL 9 06/23/2020       Cardiac Tests:  Echocardiogram done on 12/18/2020: Moderately dilated left ventricle.    Regional wall motion abnormality with severe hypokinesis of inferior,   inferolateral and apical segments with superimposed generalized   hypokinesis. Moderately reduced left ventricular systolic dysfunction. There is doppler evidence of stage II diastolic dysfunction. Probable apical thrombus. The left atrium is severely dilated. Mild thickening of the mitral valve leaflets. Mild mitral annular calcification. Moderate posteriorly directed mitral regurgitation. The aortic valve appears mildly sclerotic. Mild aortic regurgitation is noted. Mild tricuspid regurgitation. Pulmonary hypertension is moderate     ASSESSMENT / PLAN:  -Ischemic cardiomyopathy with severe systolic dysfunction/grade 2 diastolic dysfunction and apical clot: Clinically compensated.  He is on warfarin due to apical clot. -CAD with severe triple-vessel CAD: No angina at his level of activity. Not candidate for CABG. -Status post AICD placement.  -Hyperlipidemia: On atorvastatin.  -Low normal blood pressure: Asymptomatic.  -Status post left carotid stent.  -Prediabetes.   -Anemia.  -History of gout. -Status post neck surgery due to cervical spinal stenosis. -Anxiety and depression. We will continue current cardiac medications. Patient is not taking aspirin due to bruising. Patient was advised to have low-salt diet. We will arrange for the patient to have an echocardiogram to follow-up his apical LV clot. If no more clot seen, consider switching warfarin to coated aspirin 81 mg daily. Follow-up at the office in 6 months. The patient's current medication list, allergies, problem list and results of all previously ordered testing were reviewed at today's visit. {  Kirstin Grimes MD , [facility].   [facility]) Cardiology

## 2021-08-30 NOTE — TELEPHONE ENCOUNTER
I could not find the report of the echocardiogram done in April at [facility]. If the echo is not revealing apical LV clot, then might not need to repeat. However if there was a clot noted, then would need to repeat to follow-up.

## 2021-08-30 NOTE — TELEPHONE ENCOUNTER
Getting ready to call pt and schedule an echo (ordered in June 2021) when I see that he had two echos done by [facility]. The first on 4/17/21 & the 2nd on 4/20/21. Note to coordinator to see if  still wants him to have the echo that he ordered.  Thank you  Electronically signed by Albert Jenkins on 8/30/2021 at 12:16 PM

## 2021-08-30 NOTE — TELEPHONE ENCOUNTER
Remote transmission of BiV ICD showed Yellow HF alert remote device check for possible fluid accumulation    Presenting Rhythm: ApBiVp  Lead trends are stable & WNL. RA, RV & LV amplitude programmed 3.5V @ 0.4ms  Arrhythmias: None since 7/25/2021 routine report. HeartLogic HF Index is 31 (38 on 8/8/2021) & remains above the alert recovery threshold of 6  This will alert weekly until the index is below recovery threshold  Currently the contributing factors are: S3, S3/S1 ratio, respiratory rate & TI  S3: 1.08mG (1.39mg 8/8/2021)  S1: 2.87mG (3.17mG 8/8/2021)  TI: 33.3 Ohms (31 Ohms 8/8/2021)  Respiratory Rate: 17 rpm- stable  Daily & Night Heart Rate: ~ 72 bpm- stable  Activity level: 0.3 hrs/day- stable  Will continue to scan trends and monitor  RVp 94% LVp 93%  Additional Notes:  MEDS INCLUDE BUMEX 1 MG DAILY. WILL SEND TO DR. Gary Bhatt

## 2021-09-16 PROBLEM — G95.20 CERVICAL CORD COMPRESSION WITH MYELOPATHY (HCC): Status: ACTIVE | Noted: 2021-01-01

## 2021-09-16 PROBLEM — Z20.822 CLOSE EXPOSURE TO COVID-19 VIRUS: Status: ACTIVE | Noted: 2021-01-01

## 2021-09-16 PROBLEM — G83.9 PARALYSIS (HCC): Status: ACTIVE | Noted: 2021-01-01

## 2021-09-16 PROBLEM — Z01.818 PREOPERATIVE CLEARANCE: Status: ACTIVE | Noted: 2021-01-01

## 2021-09-16 PROBLEM — Z95.810 AICD (AUTOMATIC CARDIOVERTER/DEFIBRILLATOR) PRESENT: Status: ACTIVE | Noted: 2021-01-01

## 2021-09-16 PROBLEM — Z79.01 LONG TERM CURRENT USE OF ANTICOAGULANT THERAPY: Status: ACTIVE | Noted: 2021-01-01

## 2021-09-16 NOTE — PROGRESS NOTES
Mario Villarreal paid a followup visit to the [facility]) Cardiac Electrophysiology office on 2/9/21.     NOTES  He was hospitalized 4/21, having presented with severe weakness of upper and lower extremities. He was given a diagnosis of critical cervical spinal cord stenosis with cord compression. He underwent corrective surgery at [facility] later that month, remained in an acute care setting for >1 month, and then a SNF for another month. He has been home since 6/21. SYNOPSIS  1. Frailty, with advanced sarcopenia. 2. Poor metabolic health incurring multiple comorbid conditions. 3. Advanced atherosclerosis, thus far incurring severe MVD, MI (time-indeterminate, but not recent) and carotid stenting (distant). 4. Heart failure syndrome: interim NYHA II-III. 5. Cardiomyopathy:   A. Etiology ischemic. Not a candidate for revascularization. Mar Grief to have LV thrombus somewhere along the way. C. Echocardiogram 8/20: increased LVEDV, EF 15-20, \"moderate-to-severe\" MR.  D. Echocardiogram 12/20 (pre-CRT): EF 33, non-severe MR  E. Echocardiogram 4/21 (2 months post-CRT): EF 25-30, mild MR.   F. Echocardiogram 6/21 (4 months post-CRT): EF 15-20, MR not assessed (the study was apparently performed to assay for LV thrombus). 6. Sinus node somnolence: interim atrial pacing 43% (low rate 70, sensor on). 7. First degree AV block. 8. Intraventricular conduction abnormalities:  A. Right bundle branch block. B. Left fascicular block. 9. Atrial tachyarrhythmia: trivial interim burden (total since February about 13 minutes, longest single episode <1 minute duration). No antiarrhythmic drug. 10. Ventricular tachyarrhythmia: interim quiescent. Spurious detections 4/21, likely induced by cautery during above-mentioned spine surgery. 11. Resynchronization defibrillator system:   A. Implanted 2/21. [employer]. C. Normal function. 12. CHADS-VASC > 2.  Compliant with and tolerant of warfarin (INR target 2-3, titration by PCP). THOUGHTS  Given the underlying intraventricular conduction pathology, based on the echocardiogram sequence noted above coupled with stigmata consistent with persistent HF syndrome I am concerned that resynchronization therapy is exacerbating ventricular systolic dysfunction. I would therefore like to see how his ventricle responds to cessation of resynchronization therapy. Given his intrinsic AV delay and the device type, I am unable to pace the atrium without also pacing the ventricle other than in AAI mode. I am leery about this given his trifascicular block, and I have elected instead to program him VVI at 50/minute. PLAN  1. Same medicines. 2. Reprogram resynchronization defibrillator system as to avoid ventricular pacing (above). 3. Repeat echocardiogram in 3 months.      We will keep you posted.                 Austin Baumann MD, [place]        A total of 60 minutes were spent in preparation for the clinic visit, reviewing records/tests, counseling/education of the patient, ordering medications/tests/procedures, coordinating care, and documenting clinical information in the EHR.

## 2021-09-16 NOTE — LETTER
Lindy Serrano paid a followup visit to the [facility]) Cardiac Electrophysiology office on 2/9/21.     NOTES  He was hospitalized 4/21, having presented with severe weakness of upper and lower extremities. He was given a diagnosis of critical cervical spinal cord stenosis with cord compression. He underwent corrective surgery at [facility] in [place] later that month, remained in an acute care setting for >1 month, and then a SNF for another month. He has been home since 6/21. SYNOPSIS  1. Frailty, with advanced sarcopenia. 2. Poor metabolic health incurring multiple comorbid conditions. 3. Advanced atherosclerosis, thus far incurring severe MVD, MI (time-indeterminate, but not recent) and carotid stenting (distant). 4. Heart failure syndrome: interim NYHA II-III. 5. Cardiomyopathy:   A. Etiology ischemic. Not a candidate for revascularization. Austen Palomareses to have LV thrombus somewhere along the way. C. Echocardiogram 8/20: increased LVEDV, EF 15-20, \"moderate-to-severe\" MR.  D. Echocardiogram 12/20 (pre-CRT): EF 33, non-severe MR  E. Echocardiogram 4/21 (2 months post-CRT): EF 25-30, mild MR.   F. Echocardiogram 6/21 (4 months post-CRT): EF 15-20, MR not assessed (the study was apparently performed to assay for LV thrombus). 6. Sinus node somnolence: interim atrial pacing 43% (low rate 70, sensor on). 7. First degree AV block. 8. Intraventricular conduction abnormalities:  A. Right bundle branch block. B. Left fascicular block. 9. Atrial tachyarrhythmia: trivial interim burden (total since February about 13 minutes, longest single episode <1 minute duration). No antiarrhythmic drug. 10. Ventricular tachyarrhythmia: interim quiescent. Spurious detections 4/21, likely induced by cautery during above-mentioned spine surgery. 11. Resynchronization defibrillator system:   A. Implanted 2/21. [employer]. C. Normal function. 12. CHADS-VASC > 2.  Compliant with and tolerant of warfarin (INR target 2-3, titration by PCP). THOUGHTS  Given the underlying intraventricular conduction pathology, based on the echocardiogram sequence noted above coupled with stigmata consistent with persistent HF syndrome I am concerned that resynchronization therapy is exacerbating ventricular systolic dysfunction. PLAN  1. Same medicines. 2. Reprogram resynchronization defibrillator system as to avoid ventricular pacing (barring the development of AV block).   3. Repeat echocardiogram in 3 months.      We will keep you posted.                 Jazmin Gallardo MD, [facility]

## 2021-09-16 NOTE — PATIENT INSTRUCTIONS
Schedule echocardiogram for Mid-December  Remotes every 91 days BSCI  Follow up in office in 1 year with provider

## 2021-10-16 PROBLEM — Z01.818 PREOPERATIVE CLEARANCE: Status: RESOLVED | Noted: 2021-01-01 | Resolved: 2021-01-01

## 2021-10-18 NOTE — TELEPHONE ENCOUNTER
----- Message from Sanjeev Buenrostro RN sent at 10/14/2021 12:03 PM EDT -----  Pillo Sanchez is the patient 1950  ----- Message -----  From: Kendra Brandon RN  Sent: 10/13/2021   1:34 PM EDT  To: MARCIA Carreon Ira Mart, can you let me know which patient this was  in regards too. No name or  or MRN attached.   Thanks   [facility]   ----- Message -----  From: Sanjeev Buenrostro RN  Sent: 10/12/2021   9:26 AM EDT  To: Kendra Brandon RN    HeartLogic HF Index is 48 (21 on ) trending higher than the threshold with an index of 16

## 2021-10-18 NOTE — TELEPHONE ENCOUNTER
Dr Annett Fleischer cardiology and DR Humaira Blandon EP (former DR Yee Russian )  Theodore Duque 1950 [id number] says he  Feels good  Will start to weigh self daily  (needs new battery)  Denies palpitations  His SOB with activity is his normal  2 pillow orthopnea (usually 3 but now 2 pillows)  Edema: denies edema. Frequent nocturia (normal for him)   Takes diuretic at night. Because he wants to be active during the day. Tiffanie Francois fills box for him. Uses wheelchair. Also walks with walker.      Takes bumex daily at night - patient choice    Has office # for DR Jessica Garg if has any issues / concerns that develop

## 2021-11-01 NOTE — TELEPHONE ENCOUNTER
Heart logic elevated reading:    Spoke with Layo Hernandez PCP last week. Says he feels he is \"Doing good\". No edema. Breathing is \"off and on\"  as usual.  No problems now. Making good urine. Color lemonade. Bumex 1mg nightly. (preference) out and about all day. So takes at night. Nocturia 2-3 x. Sleeps on 3 pillows now. Usually sleeps on 4. Speaking in full sentences without distress. CHF Meds:  entresto 24/26mg at night only  toprol 25mg daily  bumex 1mg daily      pcp manages coumadin. July last labs in epic    PLAN:  He will start to weigh self daily again, self stopped.    Report any s/s chf to cardiology (Dr Genesis Thomas)  On recall list     Results for Vesna Lowery (MRN <[id number]>) as of 11/1/2021 12:14   4/6/2021 13:11 4/7/2021 05:33 7/20/2021 13:10   Sodium 136 139 134   Potassium 4.6 4.3 4.6   Chloride 100 104 96 (L)   CO2 27 24 28   BUN 12 13 36 (H)   Creatinine 0.8 0.8 0.8   Anion Gap 9 11 10   GFR Non- >60 >60 >60   GFR  >60 >60 >60   Magnesium 2.1     Glucose 122 (H) 88    Calcium 9.8 9.1 8.7   Total Protein 7.3  6.5

## 2021-11-01 NOTE — TELEPHONE ENCOUNTER
----- Message from Spike Degroot RN sent at 10/27/2021  8:05 AM EDT -----  HeartLogic HF Index is 43 (41 on 10/20), trending higher than the alert threshold with index of 16  This is from 10/27/21 on Carolina Rose

## 2021-12-09 NOTE — TELEPHONE ENCOUNTER
SPOKE WITH PATIENT CONCERNING SCHEDULING ECHO. PATIENT PREFERS TO GO TO [facility] TO HAVE ECHO COMPLETED DUE TO TRANSPORTATION. FAXED ORDER TO [phone] [facility] [address] TO HAVE THEM SCHEDULE THE ECHO.

## 2021-12-20 NOTE — TELEPHONE ENCOUNTER
----- Message from Ely Méndez RN sent at 12/15/2021  8:42 AM EST -----  Weekly CRT-D HF Alert for possible fluid accumulation    Implant Indication: Ischemic CM, hx CHF, hx Mural Thrombus  Per medication list pt taking Metoprolol S, Bumex, Warfarin  Mode: VVI LRL 50 bpm (to avoid BiVp)  Arrhythmias: None since 9/2021 clinic check  Slight decrease in LV & RV Impedance trends since 10/1/2021  Presenting rhythm:  AsVs ~75 bpm- query frequent PVCs vs PJCs  BiVp 1%  HeartLogic HF Index is 57 (46 on 12/8), trending higher than the alert threshold with index of 16  Currently the contributing trends are:  S3/S1 Ratio  S3: 1.94 mG (1.66 mG 12/8)  S1: 1.52 mG (1.47 mG 12/8)  Thoracic Impedance: 22.7 Ohms (18.8 Ohms 12/8)  Respiratory Rate:19 rpm  Night Heart Rate: 78 bpm- stable  Activity level: 0.4 hrs/day- stable  Mean daily HR 12 bpm- stable  Will continue to scan trends and monitor

## 2021-12-20 NOTE — TELEPHONE ENCOUNTER
DR Vic Rivas CARDIOLOGIST    IN W/C GETS LITTLE OUT OF BREATH AT TIMES. SCALE - NO BATTERY FOR SCALE. HIS SISTER IN LAW AUGUSTINE (HAD MASSIVE STROKE, CANT HELP HIM ANYMORE- SHE WAS HIS DPOA)   CLARISSA NOW IS HELPING HIM OUT. Dannie Hansonp, NIECE. WAITING FOR WATER-PILLS. (WAS OUT OF THEM AND ORDERED THEM) WAS OUT FOR A FEW DAYS. DENIES WORSE LEG EDEMA. JUST LITTLE MORE SHORT OF BREATH. SPEAKING IN FULL SENTENCES WITHOUT DISTRESS ON PHONE.  HE IS UNSURE OF HIS HOME DELIVERY [employer]- Mariel Chavez SET IT UP FOR HIM  HE THINKS HE HAS AN OLD BOTTLE LAYING AROUND. HE WILL LOOK FOR  THE BUMEX BOTTLE AND TAKE A TAB IF HE DOES. PLAN :  ·  HE WILL TAKE HIS DIURETICS ONCE COMES IN THE MAIL - SHOULD BE COMING TOMORROW. · ENCOURAGED TO HAVE FRIEND GO TO [employer] AND  2 OR 3 DAYS OF BUMEX 1MG DAILY TABS TILL MAIL ORDER COMES IN. HE HAS CLARISSA COMING OVER TOMORROW.

## 2022-01-03 NOTE — TELEPHONE ENCOUNTER
1/3/2022 Left  for Dannie Patricia [phone] (home)  to call DR Vi Victoria office with update on s/s chf  , did he get his diuretic in mail and start taking it, any sob, edema, weight gain. Making good urine? Pending call back    Anyone who answers the phone please ask the above questions, forward to BELEM Mata CNP and DR Joshua to review. Thank you.

## 2022-01-10 NOTE — TELEPHONE ENCOUNTER
Has f/u tomorrow with DR Kandice Lopez  3 attemps made for f/u on diuretic use. He can update  tomorrow at cardiology visit.

## 2023-01-01 NOTE — DISCHARGE INSTR - DIET
Good nutrition is important when healing from an illness, injury, or surgery. Follow any nutrition recommendations given to you during your hospital stay. If you were given an oral nutrition supplement while in the hospital, continue to take this supplement at home. You can take it with meals, in-between meals, and/or before bedtime. These supplements can be purchased at most local grocery stores, pharmacies, and chain [employer]-stores. If you have any questions about your diet or nutrition, call the hospital and ask for the dietitian.
Patient completed PT at [facility] and wants new order to continue. Can you put new order in?   Thanks
6